# Patient Record
Sex: FEMALE | Race: WHITE | NOT HISPANIC OR LATINO
[De-identification: names, ages, dates, MRNs, and addresses within clinical notes are randomized per-mention and may not be internally consistent; named-entity substitution may affect disease eponyms.]

---

## 2017-03-22 ENCOUNTER — RX ONLY (OUTPATIENT)
Age: 82
Setting detail: RX ONLY
End: 2017-03-22

## 2017-03-28 ENCOUNTER — HOSPITAL ENCOUNTER (OUTPATIENT)
Dept: HOSPITAL 8 - CFH | Age: 82
Discharge: HOME | End: 2017-03-28
Attending: INTERNAL MEDICINE
Payer: MEDICARE

## 2017-03-28 DIAGNOSIS — R07.89: Primary | ICD-10-CM

## 2017-03-28 DIAGNOSIS — Z86.73: ICD-10-CM

## 2017-03-28 DIAGNOSIS — Z87.891: ICD-10-CM

## 2017-03-28 DIAGNOSIS — Q21.1: ICD-10-CM

## 2017-03-28 DIAGNOSIS — I10: ICD-10-CM

## 2017-03-28 DIAGNOSIS — I08.3: ICD-10-CM

## 2017-03-28 PROCEDURE — 93306 TTE W/DOPPLER COMPLETE: CPT

## 2017-03-28 PROCEDURE — A9502 TC99M TETROFOSMIN: HCPCS

## 2017-03-28 PROCEDURE — 78452 HT MUSCLE IMAGE SPECT MULT: CPT

## 2017-03-28 PROCEDURE — 93017 CV STRESS TEST TRACING ONLY: CPT

## 2017-04-05 ENCOUNTER — HOSPITAL ENCOUNTER (INPATIENT)
Dept: HOSPITAL 8 - ED | Age: 82
LOS: 1 days | Discharge: HOME | DRG: 74 | End: 2017-04-06
Attending: INTERNAL MEDICINE | Admitting: INTERNAL MEDICINE
Payer: MEDICARE

## 2017-04-05 VITALS — HEIGHT: 62 IN | BODY MASS INDEX: 32.66 KG/M2 | WEIGHT: 177.47 LBS

## 2017-04-05 VITALS — SYSTOLIC BLOOD PRESSURE: 148 MMHG | DIASTOLIC BLOOD PRESSURE: 63 MMHG

## 2017-04-05 VITALS — DIASTOLIC BLOOD PRESSURE: 70 MMHG | SYSTOLIC BLOOD PRESSURE: 155 MMHG

## 2017-04-05 DIAGNOSIS — I08.1: ICD-10-CM

## 2017-04-05 DIAGNOSIS — Z88.2: ICD-10-CM

## 2017-04-05 DIAGNOSIS — E87.6: ICD-10-CM

## 2017-04-05 DIAGNOSIS — G90.8: Primary | ICD-10-CM

## 2017-04-05 DIAGNOSIS — E86.0: ICD-10-CM

## 2017-04-05 DIAGNOSIS — J01.90: ICD-10-CM

## 2017-04-05 DIAGNOSIS — Z90.49: ICD-10-CM

## 2017-04-05 DIAGNOSIS — I10: ICD-10-CM

## 2017-04-05 DIAGNOSIS — E78.5: ICD-10-CM

## 2017-04-05 DIAGNOSIS — R73.9: ICD-10-CM

## 2017-04-05 DIAGNOSIS — E83.42: ICD-10-CM
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
## 2017-04-05 DIAGNOSIS — E87.1: ICD-10-CM

## 2017-04-05 DIAGNOSIS — J32.9: ICD-10-CM

## 2017-04-05 DIAGNOSIS — Z90.710: ICD-10-CM

## 2017-04-05 DIAGNOSIS — Z88.0: ICD-10-CM

## 2017-04-05 DIAGNOSIS — F17.200: ICD-10-CM

## 2017-04-05 DIAGNOSIS — R41.3: ICD-10-CM

## 2017-04-05 DIAGNOSIS — Z96.651: ICD-10-CM

## 2017-04-05 LAB
AST SERPL-CCNC: 10 U/L (ref 15–37)
BUN SERPL-MCNC: 20 MG/DL (ref 7–18)
HGB BLD-MCNC: 12 G/DL (ref 11.7–16.4)
IS PT STATUS REG ER OR PRE ER?: NO
IS PT STATUS REG ER OR PRE ER?: YES
IS PT STATUS REG ER OR PRE ER?: YES

## 2017-04-05 PROCEDURE — 71010: CPT

## 2017-04-05 PROCEDURE — 84484 ASSAY OF TROPONIN QUANT: CPT

## 2017-04-05 PROCEDURE — 93005 ELECTROCARDIOGRAM TRACING: CPT

## 2017-04-05 PROCEDURE — 81003 URINALYSIS AUTO W/O SCOPE: CPT

## 2017-04-05 PROCEDURE — 85730 THROMBOPLASTIN TIME PARTIAL: CPT

## 2017-04-05 PROCEDURE — 36415 COLL VENOUS BLD VENIPUNCTURE: CPT

## 2017-04-05 PROCEDURE — 93880 EXTRACRANIAL BILAT STUDY: CPT

## 2017-04-05 PROCEDURE — 80053 COMPREHEN METABOLIC PANEL: CPT

## 2017-04-05 PROCEDURE — 0T9B70Z DRAINAGE OF BLADDER WITH DRAINAGE DEVICE, VIA NATURAL OR ARTIFICIAL OPENING: ICD-10-PCS | Performed by: EMERGENCY MEDICINE

## 2017-04-05 PROCEDURE — 82962 GLUCOSE BLOOD TEST: CPT

## 2017-04-05 PROCEDURE — 70551 MRI BRAIN STEM W/O DYE: CPT

## 2017-04-05 PROCEDURE — 85610 PROTHROMBIN TIME: CPT

## 2017-04-05 PROCEDURE — 95819 EEG AWAKE AND ASLEEP: CPT

## 2017-04-05 PROCEDURE — 99285 EMERGENCY DEPT VISIT HI MDM: CPT

## 2017-04-05 PROCEDURE — 85025 COMPLETE CBC W/AUTO DIFF WBC: CPT

## 2017-04-05 PROCEDURE — 84443 ASSAY THYROID STIM HORMONE: CPT

## 2017-04-05 PROCEDURE — 83036 HEMOGLOBIN GLYCOSYLATED A1C: CPT

## 2017-04-05 PROCEDURE — 72125 CT NECK SPINE W/O DYE: CPT

## 2017-04-05 PROCEDURE — 83880 ASSAY OF NATRIURETIC PEPTIDE: CPT

## 2017-04-05 PROCEDURE — 83735 ASSAY OF MAGNESIUM: CPT

## 2017-04-05 PROCEDURE — 70450 CT HEAD/BRAIN W/O DYE: CPT

## 2017-04-05 RX ADMIN — ENOXAPARIN SODIUM SCH MG: 40 INJECTION SUBCUTANEOUS at 16:28

## 2017-04-05 RX ADMIN — INSULIN ASPART SCH UNITS: 100 INJECTION, SOLUTION INTRAVENOUS; SUBCUTANEOUS at 20:24

## 2017-04-05 RX ADMIN — FLUTICASONE PROPIONATE SCH SPRAY: 50 SPRAY, METERED NASAL at 20:24

## 2017-04-05 RX ADMIN — SODIUM CHLORIDE SCH MLS/HR: 0.9 INJECTION, SOLUTION INTRAVENOUS at 16:27

## 2017-04-05 RX ADMIN — SODIUM CHLORIDE, PRESERVATIVE FREE SCH ML: 5 INJECTION INTRAVENOUS at 20:24

## 2017-04-05 RX ADMIN — DOXYCYCLINE SCH MLS/HR: 100 INJECTION, POWDER, LYOPHILIZED, FOR SOLUTION INTRAVENOUS at 16:27

## 2017-04-05 RX ADMIN — INSULIN ASPART SCH UNITS: 100 INJECTION, SOLUTION INTRAVENOUS; SUBCUTANEOUS at 16:00

## 2017-04-06 VITALS — DIASTOLIC BLOOD PRESSURE: 72 MMHG | SYSTOLIC BLOOD PRESSURE: 146 MMHG

## 2017-04-06 VITALS — DIASTOLIC BLOOD PRESSURE: 70 MMHG | SYSTOLIC BLOOD PRESSURE: 144 MMHG

## 2017-04-06 VITALS — SYSTOLIC BLOOD PRESSURE: 151 MMHG | DIASTOLIC BLOOD PRESSURE: 78 MMHG

## 2017-04-06 LAB
AST SERPL-CCNC: 9 U/L (ref 15–37)
BUN SERPL-MCNC: 16 MG/DL (ref 7–18)
HGB BLD-MCNC: 11.3 G/DL (ref 11.7–16.4)

## 2017-04-06 RX ADMIN — FLUTICASONE PROPIONATE SCH SPRAY: 50 SPRAY, METERED NASAL at 08:53

## 2017-04-06 RX ADMIN — SODIUM CHLORIDE SCH MLS/HR: 0.9 INJECTION, SOLUTION INTRAVENOUS at 08:54

## 2017-04-06 RX ADMIN — POTASSIUM CHLORIDE SCH MEQ: 20 TABLET, EXTENDED RELEASE ORAL at 16:05

## 2017-04-06 RX ADMIN — INSULIN ASPART SCH UNITS: 100 INJECTION, SOLUTION INTRAVENOUS; SUBCUTANEOUS at 11:00

## 2017-04-06 RX ADMIN — DOXYCYCLINE SCH MLS/HR: 100 INJECTION, POWDER, LYOPHILIZED, FOR SOLUTION INTRAVENOUS at 02:35

## 2017-04-06 RX ADMIN — SODIUM CHLORIDE SCH MLS/HR: 0.9 INJECTION, SOLUTION INTRAVENOUS at 17:00

## 2017-04-06 RX ADMIN — SODIUM CHLORIDE, PRESERVATIVE FREE SCH ML: 5 INJECTION INTRAVENOUS at 08:54

## 2017-04-06 RX ADMIN — INSULIN ASPART SCH UNITS: 100 INJECTION, SOLUTION INTRAVENOUS; SUBCUTANEOUS at 15:05

## 2017-04-06 RX ADMIN — ENOXAPARIN SODIUM SCH MG: 40 INJECTION SUBCUTANEOUS at 14:30

## 2017-04-06 RX ADMIN — DOXYCYCLINE SCH MLS/HR: 100 INJECTION, POWDER, LYOPHILIZED, FOR SOLUTION INTRAVENOUS at 15:10

## 2017-04-06 RX ADMIN — INSULIN ASPART SCH UNITS: 100 INJECTION, SOLUTION INTRAVENOUS; SUBCUTANEOUS at 07:00

## 2017-04-06 RX ADMIN — POTASSIUM CHLORIDE SCH MEQ: 20 TABLET, EXTENDED RELEASE ORAL at 11:06

## 2017-06-30 ENCOUNTER — HOSPITAL ENCOUNTER (OUTPATIENT)
Dept: HOSPITAL 8 - WOUND | Age: 82
Discharge: HOME | End: 2017-06-30
Attending: PHYSICIAN ASSISTANT
Payer: MEDICARE

## 2017-06-30 DIAGNOSIS — Z96.651: ICD-10-CM

## 2017-06-30 DIAGNOSIS — L97.812: Primary | ICD-10-CM

## 2017-06-30 DIAGNOSIS — M19.90: ICD-10-CM

## 2017-06-30 DIAGNOSIS — Z90.710: ICD-10-CM

## 2017-06-30 DIAGNOSIS — N18.3: ICD-10-CM

## 2017-06-30 DIAGNOSIS — F17.210: ICD-10-CM

## 2017-06-30 DIAGNOSIS — Z72.89: ICD-10-CM

## 2017-06-30 DIAGNOSIS — E78.5: ICD-10-CM

## 2017-06-30 DIAGNOSIS — I12.9: ICD-10-CM

## 2017-06-30 PROCEDURE — 11042 DBRDMT SUBQ TIS 1ST 20SQCM/<: CPT

## 2017-07-07 ENCOUNTER — HOSPITAL ENCOUNTER (OUTPATIENT)
Dept: HOSPITAL 8 - WOUND | Age: 82
Discharge: HOME | End: 2017-07-07
Attending: PHYSICIAN ASSISTANT
Payer: MEDICARE

## 2017-07-07 DIAGNOSIS — Z72.89: ICD-10-CM

## 2017-07-07 DIAGNOSIS — N18.3: ICD-10-CM

## 2017-07-07 DIAGNOSIS — E78.5: ICD-10-CM

## 2017-07-07 DIAGNOSIS — F17.210: ICD-10-CM

## 2017-07-07 DIAGNOSIS — Z90.710: ICD-10-CM

## 2017-07-07 DIAGNOSIS — L97.812: Primary | ICD-10-CM

## 2017-07-07 DIAGNOSIS — Z79.01: ICD-10-CM

## 2017-07-07 DIAGNOSIS — I12.9: ICD-10-CM

## 2017-07-07 DIAGNOSIS — M19.90: ICD-10-CM

## 2017-07-07 PROCEDURE — 97597 DBRDMT OPN WND 1ST 20 CM/<: CPT

## 2017-07-21 ENCOUNTER — HOSPITAL ENCOUNTER (OUTPATIENT)
Dept: HOSPITAL 8 - WOUND | Age: 82
Discharge: HOME | End: 2017-07-21
Attending: PHYSICIAN ASSISTANT
Payer: MEDICARE

## 2017-07-21 DIAGNOSIS — E78.5: ICD-10-CM

## 2017-07-21 DIAGNOSIS — Z96.651: ICD-10-CM

## 2017-07-21 DIAGNOSIS — F17.210: ICD-10-CM

## 2017-07-21 DIAGNOSIS — Z90.710: ICD-10-CM

## 2017-07-21 DIAGNOSIS — L97.821: Primary | ICD-10-CM

## 2017-07-21 DIAGNOSIS — I12.9: ICD-10-CM

## 2017-07-21 DIAGNOSIS — Z72.89: ICD-10-CM

## 2017-07-21 DIAGNOSIS — M19.90: ICD-10-CM

## 2017-07-21 DIAGNOSIS — N18.3: ICD-10-CM

## 2017-07-21 PROCEDURE — 97597 DBRDMT OPN WND 1ST 20 CM/<: CPT

## 2017-07-28 ENCOUNTER — HOSPITAL ENCOUNTER (OUTPATIENT)
Dept: HOSPITAL 8 - WOUND | Age: 82
Discharge: HOME | End: 2017-07-28
Attending: PHYSICIAN ASSISTANT
Payer: MEDICARE

## 2017-07-28 DIAGNOSIS — Z79.01: ICD-10-CM

## 2017-07-28 DIAGNOSIS — Z72.89: ICD-10-CM

## 2017-07-28 DIAGNOSIS — L97.812: Primary | ICD-10-CM

## 2017-07-28 DIAGNOSIS — N18.3: ICD-10-CM

## 2017-07-28 DIAGNOSIS — Z90.710: ICD-10-CM

## 2017-07-28 DIAGNOSIS — Z96.651: ICD-10-CM

## 2017-07-28 DIAGNOSIS — E78.5: ICD-10-CM

## 2017-07-28 DIAGNOSIS — M19.90: ICD-10-CM

## 2017-07-28 DIAGNOSIS — F17.210: ICD-10-CM

## 2017-07-28 DIAGNOSIS — I12.9: ICD-10-CM

## 2018-02-27 ENCOUNTER — HOSPITAL ENCOUNTER (OUTPATIENT)
Dept: HOSPITAL 8 - CFH | Age: 83
Discharge: HOME | End: 2018-02-27
Attending: NURSE PRACTITIONER
Payer: MEDICARE

## 2018-02-27 DIAGNOSIS — N18.9: ICD-10-CM

## 2018-02-27 DIAGNOSIS — I12.9: ICD-10-CM

## 2018-02-27 DIAGNOSIS — I65.23: Primary | ICD-10-CM

## 2018-02-27 PROCEDURE — 93880 EXTRACRANIAL BILAT STUDY: CPT

## 2020-03-08 ENCOUNTER — HOSPITAL ENCOUNTER (EMERGENCY)
Dept: HOSPITAL 8 - ED | Age: 85
Discharge: HOME | End: 2020-03-08
Payer: MEDICARE

## 2020-03-08 VITALS — SYSTOLIC BLOOD PRESSURE: 116 MMHG | DIASTOLIC BLOOD PRESSURE: 58 MMHG

## 2020-03-08 VITALS — BODY MASS INDEX: 31.22 KG/M2 | WEIGHT: 165.35 LBS | HEIGHT: 61 IN

## 2020-03-08 DIAGNOSIS — Z90.710: ICD-10-CM

## 2020-03-08 DIAGNOSIS — F17.200: ICD-10-CM

## 2020-03-08 DIAGNOSIS — R55: Primary | ICD-10-CM

## 2020-03-08 DIAGNOSIS — Z90.89: ICD-10-CM

## 2020-03-08 DIAGNOSIS — I10: ICD-10-CM

## 2020-03-08 LAB
ALBUMIN SERPL-MCNC: 3.3 G/DL (ref 3.4–5)
ANION GAP SERPL CALC-SCNC: 9 MMOL/L (ref 5–15)
BASOPHILS # BLD AUTO: 0.04 X10^3/UL (ref 0–0.1)
BASOPHILS NFR BLD AUTO: 1 % (ref 0–1)
CALCIUM SERPL-MCNC: 8.6 MG/DL (ref 8.5–10.1)
CHLORIDE SERPL-SCNC: 104 MMOL/L (ref 98–107)
CREAT SERPL-MCNC: 1.04 MG/DL (ref 0.55–1.02)
EOSINOPHIL # BLD AUTO: 0.23 X10^3/UL (ref 0–0.4)
EOSINOPHIL NFR BLD AUTO: 4 % (ref 1–7)
ERYTHROCYTE [DISTWIDTH] IN BLOOD BY AUTOMATED COUNT: 15.3 % (ref 9.6–15.2)
LYMPHOCYTES # BLD AUTO: 1.85 X10^3/UL (ref 1–3.4)
LYMPHOCYTES NFR BLD AUTO: 33 % (ref 22–44)
MCH RBC QN AUTO: 29.6 PG (ref 27–34.8)
MCHC RBC AUTO-ENTMCNC: 32.9 G/DL (ref 32.4–35.8)
MCV RBC AUTO: 90 FL (ref 80–100)
MD: NO
MONOCYTES # BLD AUTO: 0.26 X10^3/UL (ref 0.2–0.8)
MONOCYTES NFR BLD AUTO: 5 % (ref 2–9)
NEUTROPHILS # BLD AUTO: 3.23 X10^3/UL (ref 1.8–6.8)
NEUTROPHILS NFR BLD AUTO: 58 % (ref 42–75)
PLATELET # BLD AUTO: 249 X10^3/UL (ref 130–400)
PMV BLD AUTO: 8.8 FL (ref 7.4–10.4)
RBC # BLD AUTO: 4.21 X10^6/UL (ref 3.82–5.3)
TROPONIN I SERPL-MCNC: < 0.015 NG/ML (ref 0–0.04)

## 2020-03-08 PROCEDURE — 80048 BASIC METABOLIC PNL TOTAL CA: CPT

## 2020-03-08 PROCEDURE — 99284 EMERGENCY DEPT VISIT MOD MDM: CPT

## 2020-03-08 PROCEDURE — 82040 ASSAY OF SERUM ALBUMIN: CPT

## 2020-03-08 PROCEDURE — 99285 EMERGENCY DEPT VISIT HI MDM: CPT

## 2020-03-08 PROCEDURE — 83880 ASSAY OF NATRIURETIC PEPTIDE: CPT

## 2020-03-08 PROCEDURE — 71045 X-RAY EXAM CHEST 1 VIEW: CPT

## 2020-03-08 PROCEDURE — 36415 COLL VENOUS BLD VENIPUNCTURE: CPT

## 2020-03-08 PROCEDURE — 84484 ASSAY OF TROPONIN QUANT: CPT

## 2020-03-08 PROCEDURE — 93005 ELECTROCARDIOGRAM TRACING: CPT

## 2020-03-08 PROCEDURE — 85025 COMPLETE CBC W/AUTO DIFF WBC: CPT

## 2021-01-14 DIAGNOSIS — Z23 NEED FOR VACCINATION: ICD-10-CM

## 2021-02-23 ENCOUNTER — HOSPITAL ENCOUNTER (OUTPATIENT)
Dept: HOSPITAL 8 - CFH | Age: 86
Discharge: HOME | End: 2021-02-23
Attending: NURSE PRACTITIONER
Payer: MEDICARE

## 2021-02-23 DIAGNOSIS — Z12.2: Primary | ICD-10-CM

## 2021-02-23 DIAGNOSIS — I25.10: ICD-10-CM

## 2021-02-23 DIAGNOSIS — F17.210: ICD-10-CM

## 2021-02-23 DIAGNOSIS — K44.9: ICD-10-CM

## 2021-02-23 PROCEDURE — 71271 CT THORAX LUNG CANCER SCR C-: CPT

## 2021-07-22 ENCOUNTER — HOSPITAL ENCOUNTER (OUTPATIENT)
Dept: HOSPITAL 8 - CFH | Age: 86
Discharge: HOME | End: 2021-07-22
Attending: NURSE PRACTITIONER
Payer: MEDICARE

## 2021-07-22 DIAGNOSIS — Z12.31: Primary | ICD-10-CM

## 2021-07-22 PROCEDURE — 77063 BREAST TOMOSYNTHESIS BI: CPT

## 2021-07-22 PROCEDURE — 77067 SCR MAMMO BI INCL CAD: CPT

## 2022-11-07 ENCOUNTER — HOSPITAL ENCOUNTER (EMERGENCY)
Facility: MEDICAL CENTER | Age: 87
End: 2022-11-07
Attending: STUDENT IN AN ORGANIZED HEALTH CARE EDUCATION/TRAINING PROGRAM
Payer: MEDICARE

## 2022-11-07 ENCOUNTER — APPOINTMENT (OUTPATIENT)
Dept: RADIOLOGY | Facility: MEDICAL CENTER | Age: 87
End: 2022-11-07
Attending: STUDENT IN AN ORGANIZED HEALTH CARE EDUCATION/TRAINING PROGRAM
Payer: MEDICARE

## 2022-11-07 VITALS
TEMPERATURE: 98.1 F | RESPIRATION RATE: 16 BRPM | WEIGHT: 150 LBS | HEART RATE: 81 BPM | HEIGHT: 61 IN | BODY MASS INDEX: 28.32 KG/M2 | OXYGEN SATURATION: 91 % | SYSTOLIC BLOOD PRESSURE: 147 MMHG | DIASTOLIC BLOOD PRESSURE: 66 MMHG

## 2022-11-07 DIAGNOSIS — R03.0 ELEVATED BLOOD PRESSURE READING: ICD-10-CM

## 2022-11-07 DIAGNOSIS — E87.1 HYPONATREMIA: ICD-10-CM
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
## 2022-11-07 DIAGNOSIS — F10.920 ALCOHOLIC INTOXICATION WITHOUT COMPLICATION (HCC): ICD-10-CM

## 2022-11-07 LAB
ALBUMIN SERPL BCP-MCNC: 3.9 G/DL (ref 3.2–4.9)
ALBUMIN/GLOB SERPL: 1.5 G/DL
ALP SERPL-CCNC: 76 U/L (ref 30–99)
ALT SERPL-CCNC: 16 U/L (ref 2–50)
ANION GAP SERPL CALC-SCNC: 14 MMOL/L (ref 7–16)
AST SERPL-CCNC: 28 U/L (ref 12–45)
BASOPHILS # BLD AUTO: 1.1 % (ref 0–1.8)
BASOPHILS # BLD: 0.06 K/UL (ref 0–0.12)
BILIRUB SERPL-MCNC: 0.2 MG/DL (ref 0.1–1.5)
BUN SERPL-MCNC: 15 MG/DL (ref 8–22)
CALCIUM SERPL-MCNC: 8.4 MG/DL (ref 8.5–10.5)
CHLORIDE SERPL-SCNC: 94 MMOL/L (ref 96–112)
CO2 SERPL-SCNC: 21 MMOL/L (ref 20–33)
CREAT SERPL-MCNC: 0.87 MG/DL (ref 0.5–1.4)
EKG IMPRESSION: NORMAL
EOSINOPHIL # BLD AUTO: 0.34 K/UL (ref 0–0.51)
EOSINOPHIL NFR BLD: 6 % (ref 0–6.9)
ERYTHROCYTE [DISTWIDTH] IN BLOOD BY AUTOMATED COUNT: 45.3 FL (ref 35.9–50)
ETHANOL BLD-MCNC: 269 MG/DL
GFR SERPLBLD CREATININE-BSD FMLA CKD-EPI: 65 ML/MIN/1.73 M 2
GLOBULIN SER CALC-MCNC: 2.6 G/DL (ref 1.9–3.5)
GLUCOSE SERPL-MCNC: 102 MG/DL (ref 65–99)
HCT VFR BLD AUTO: 39.9 % (ref 37–47)
HGB BLD-MCNC: 13.4 G/DL (ref 12–16)
IMM GRANULOCYTES # BLD AUTO: 0.02 K/UL (ref 0–0.11)
IMM GRANULOCYTES NFR BLD AUTO: 0.4 % (ref 0–0.9)
LYMPHOCYTES # BLD AUTO: 2.35 K/UL (ref 1–4.8)
LYMPHOCYTES NFR BLD: 41.8 % (ref 22–41)
MCH RBC QN AUTO: 29.6 PG (ref 27–33)
MCHC RBC AUTO-ENTMCNC: 33.6 G/DL (ref 33.6–35)
MCV RBC AUTO: 88.3 FL (ref 81.4–97.8)
MONOCYTES # BLD AUTO: 0.32 K/UL (ref 0–0.85)
MONOCYTES NFR BLD AUTO: 5.7 % (ref 0–13.4)
NEUTROPHILS # BLD AUTO: 2.53 K/UL (ref 2–7.15)
NEUTROPHILS NFR BLD: 45 % (ref 44–72)
NRBC # BLD AUTO: 0 K/UL
NRBC BLD-RTO: 0 /100 WBC
PLATELET # BLD AUTO: 220 K/UL (ref 164–446)
PMV BLD AUTO: 10.3 FL (ref 9–12.9)
POTASSIUM SERPL-SCNC: 3.6 MMOL/L (ref 3.6–5.5)
PROT SERPL-MCNC: 6.5 G/DL (ref 6–8.2)
RBC # BLD AUTO: 4.52 M/UL (ref 4.2–5.4)
SODIUM SERPL-SCNC: 129 MMOL/L (ref 135–145)
WBC # BLD AUTO: 5.6 K/UL (ref 4.8–10.8)

## 2022-11-07 PROCEDURE — 85025 COMPLETE CBC W/AUTO DIFF WBC: CPT

## 2022-11-07 PROCEDURE — 99284 EMERGENCY DEPT VISIT MOD MDM: CPT

## 2022-11-07 PROCEDURE — 80053 COMPREHEN METABOLIC PANEL: CPT

## 2022-11-07 PROCEDURE — 72125 CT NECK SPINE W/O DYE: CPT

## 2022-11-07 PROCEDURE — 82077 ASSAY SPEC XCP UR&BREATH IA: CPT

## 2022-11-07 PROCEDURE — 93005 ELECTROCARDIOGRAM TRACING: CPT | Performed by: STUDENT IN AN ORGANIZED HEALTH CARE EDUCATION/TRAINING PROGRAM

## 2022-11-07 PROCEDURE — 70450 CT HEAD/BRAIN W/O DYE: CPT

## 2022-11-07 PROCEDURE — 36415 COLL VENOUS BLD VENIPUNCTURE: CPT

## 2022-11-07 NOTE — ED NOTES
Pt continually attempting to get out of bed stating she wants to go home. ERP at bedside for re-eval. Pt educated that she cannot leave until she is sober or someone is able to pick her up. Pt does not verbalize understanding, reinforcement needed. Pt in direct view of staff.

## 2022-11-07 NOTE — ED NOTES
ERP at bedside to discuss discharge with pt's sister. PIV removed. Pt ambulated from ED with steady gait. Pt discharged home to sister, Rosalba.

## 2022-11-07 NOTE — ED TRIAGE NOTES
"Chief Complaint   Patient presents with    GLF    Alcohol Intoxication     Pt BIB EMS from [facility] [place] for possible GLF, pt cannot recall events and there were no witnesses. EMS called after hotel staff saw pt laying on ground unconscious. Pt reports drinking 4 rum and cokes this evening, states she does not remember falling. Unknown head strike, +LOC, +thinners. No obvious signs of trauma, pt denies any pain. Chart up for ERP.     BP (!) 140/63   Pulse 65   Temp 36.2 °C (97.1 °F) (Temporal)   Resp 18   Ht 1.549 m (5' 1\")   Wt 68 kg (150 lb)   SpO2 93%   BMI 28.34 kg/m²     "

## 2022-11-07 NOTE — ED PROVIDER NOTES
ED Provider Note    CHIEF COMPLAINT  Chief Complaint   Patient presents with    GLF    Alcohol Intoxication       HPI  Lily Lloyd is a 87 y.o. female who presents after a ground-level fall.  Patient does not recall the events and tells me that 'nothing happened.'  Reportedly EMS were called as she had a possible GLF at the [facility] and EMS found her lying on the ground unconscious.  Patient does tell me that she takes blood thinners but then on further questioning says she takes 'lisinopril'.  I am unable to find any anticoagulation in her medication list.  She does not remember falling and said she did not hit her head.  She denies any pain anywhere.  She does not feel lightheaded and denies any complaints at this time.  She denies any chest pain or shortness of breath.  She does endorse drinking alcohol says she had 4 rum and Cokes earlier this evening.  Further history limited as patient is clearly intoxicated    REVIEW OF SYSTEMS  Unable to obtain  due to patient condition    PAST MEDICAL HISTORY  Past Medical History:   Diagnosis Date    Arthritis     every where    Breath shortness     EXERCISE    CATARACT     Diabetes     Told pre-diabetic 2 yrs ago, diet controlled    Heart burn     takes protonix occasionally    Hiatus hernia syndrome     Hypertension     controlled on meds    Indigestion     Acid Reflux    Pain     Renal disorder     currently being worked up.     Snoring     Does not want to have evaluated at this time     Unable to provide family history     SOCIAL HISTORY  Social History     Tobacco Use    Smoking status: Every Day     Packs/day: 0.50     Years: 50.00     Pack years: 25.00     Types: Cigarettes    Smokeless tobacco: Current    Tobacco comments:     0.5 PPD X45 YRS   Substance Use Topics    Alcohol use: Yes     Alcohol/week: 1.0 oz     Types: 2 Glasses of wine per week     Comment: socially    Drug use: No       SURGICAL HISTORY  Past Surgical History:   Procedure Laterality Date     CATARACT PHACO WITH IOL  8/20/2013    Performed by Alejandro Peres M.D. at SURGERY [facility] ORS    CATARACT PHACO WITH IOL  8/6/2013    Performed by Alejandro Peres M.D. at SURGERY [facility] ORS    SINUSOTOMIES  12/20/2012    Performed by Hattie Rolle M.D. [facility]    NASAL POLYPECTOMY  12/20/2012    Performed by Hattie Rolle M.D. at SURGERY [facility]S    NASAL POLYPECTOMY  8/19/2010    Performed by HATTIE ROLLE at SURGERY [facility] ORS    ETHMOIDECTOMY  8/19/2010    Performed by HATTIE ROLLE at SURGERY [facility] ORS    ANTROSTOMY  8/19/2010    Performed by HATTIE ROLLE at SURGERY [facility] ORS    ABDOMINAL HYSTERECTOMY TOTAL      APPENDECTOMY      KNEE ARTHROPLASTY TOTAL      right    OOPHORECTOMY         CURRENT MEDICATIONS  Home Medications       Reviewed by Orin Urban R.N. (Registered Nurse) on 11/07/22 at 0020  Med List Status: <None>     Medication Last Dose Status   acetaminophen (Q-NOL) 325 MG Tab  Active   albuterol (VENTOLIN OR PROVENTIL) 108 (90 BASE) MCG/ACT AERS inhalation aerosol  Active   Cholecalciferol (VITAMIN D3) 2000 UNIT CAPS  Active   docosahexanoic acid (OMEGA 3 FA) 1000 MG CAPS  Active   ifurnej-ugbzexkhds-yet (ROBITUSSIN CF) 30- MG/5ML LIQD  Active   guaifenesin-codeine (TUSSI-ORGANIDIN NR) 100-10 MG/5ML syrup  Active   hydrochlorothiazide (HYDRODIURIL) 12.5 MG tablet  Active   lisinopril (PRINIVIL, ZESTRIL) 40 MG tablet  Active   metoprolol SR (TOPROL XL) 50 MG TABLET SR 24 HR  Active   Multiple Vitamins-Minerals (MULTIVITAMIN PO)  Active   pantoprazole (PROTONIX) 40 MG TBEC  Active   potassium chloride SA (K-DUR) 20 MEQ Tab CR  Active   simvastatin (ZOCOR) 40 MG Tab  Active   tizanidine (ZANAFLEX) 4 MG Tab  Active                    ALLERGIES  Allergies   Allergen Reactions    Pcn [Penicillins] Rash and Swelling    Sulfa Drugs Hives and Itching    Tape        PHYSICAL EXAM  VITAL SIGNS:  "BP (!) 147/66   Pulse 81   Temp 36.7 °C (98.1 °F) (Temporal)   Resp 16   Ht 1.549 m (5' 1\")   Wt 68 kg (150 lb)   SpO2 91%   BMI 28.34 kg/m²    Constitutional: Awake and alert . Intoxicated  HENT: NCAT.  moist mucous membranes  Eyes: Normal inspection  Neck: Grossly normal range of motion.  No midline neck pain.  Cardiovascular: Normal heart rate, Normal rhythm. No audible murmur. Symmetric peripheral pulses.   Thorax & Lungs: No respiratory distress, No wheezing, No rales, No rhonchi, No chest tenderness.   Abdomen: Soft, non-distended, nontender, no mass  Skin: No obvious rash.  Back: No tenderness, No CVA tenderness.   Extremities: Warm, well perfused. No clubbing, cyanosis, edema,   Neurologic: Alert though confused.  Moving all extremities symmetrically.  Psychiatric: Normal for situation    RADIOLOGY/PROCEDURES  CT-HEAD W/O   Final Result      1. No CT evidence of acute infarct, hemorrhage or mass.   2. Mild global parenchymal atrophy. Chronic small vessel ischemic changes.   3. Chronic paranasal sinus disease.      CT-CSPINE WITHOUT PLUS RECONS   Final Result      No acute fracture or traumatic listhesis in the cervical spine.           Imaging is interpreted by radiologist    Labs:  Results for orders placed or performed during the hospital encounter of 11/07/22   DIAGNOSTIC ALCOHOL   Result Value Ref Range    Diagnostic Alcohol 269.0 (H) <10.1 mg/dL   COMP METABOLIC PANEL   Result Value Ref Range    Sodium 129 (L) 135 - 145 mmol/L    Potassium 3.6 3.6 - 5.5 mmol/L    Chloride 94 (L) 96 - 112 mmol/L    Co2 21 20 - 33 mmol/L    Anion Gap 14.0 7.0 - 16.0    Glucose 102 (H) 65 - 99 mg/dL    Bun 15 8 - 22 mg/dL    Creatinine 0.87 0.50 - 1.40 mg/dL    Calcium 8.4 (L) 8.5 - 10.5 mg/dL    AST(SGOT) 28 12 - 45 U/L    ALT(SGPT) 16 2 - 50 U/L    Alkaline Phosphatase 76 30 - 99 U/L    Total Bilirubin 0.2 0.1 - 1.5 mg/dL    Albumin 3.9 3.2 - 4.9 g/dL    Total Protein 6.5 6.0 - 8.2 g/dL    Globulin 2.6 1.9 - 3.5 " g/dL    A-G Ratio 1.5 g/dL   CBC WITH DIFFERENTIAL   Result Value Ref Range    WBC 5.6 4.8 - 10.8 K/uL    RBC 4.52 4.20 - 5.40 M/uL    Hemoglobin 13.4 12.0 - 16.0 g/dL    Hematocrit 39.9 37.0 - 47.0 %    MCV 88.3 81.4 - 97.8 fL    MCH 29.6 27.0 - 33.0 pg    MCHC 33.6 33.6 - 35.0 g/dL    RDW 45.3 35.9 - 50.0 fL    Platelet Count 220 164 - 446 K/uL    MPV 10.3 9.0 - 12.9 fL    Neutrophils-Polys 45.00 44.00 - 72.00 %    Lymphocytes 41.80 (H) 22.00 - 41.00 %    Monocytes 5.70 0.00 - 13.40 %    Eosinophils 6.00 0.00 - 6.90 %    Basophils 1.10 0.00 - 1.80 %    Immature Granulocytes 0.40 0.00 - 0.90 %    Nucleated RBC 0.00 /100 WBC    Neutrophils (Absolute) 2.53 2.00 - 7.15 K/uL    Lymphs (Absolute) 2.35 1.00 - 4.80 K/uL    Monos (Absolute) 0.32 0.00 - 0.85 K/uL    Eos (Absolute) 0.34 0.00 - 0.51 K/uL    Baso (Absolute) 0.06 0.00 - 0.12 K/uL    Immature Granulocytes (abs) 0.02 0.00 - 0.11 K/uL    NRBC (Absolute) 0.00 K/uL   ESTIMATED GFR   Result Value Ref Range    GFR (CKD-EPI) 65 >60 mL/min/1.73 m 2   EKG   Result Value Ref Range    Report       [facility] Emergency Dept.    Test Date:  2022  Pt Name:    ZULEIMA COLE                  Department: ER  MRN:        [id number]                      Room:       RD 08  Gender:     Female                       Technician: [id number]  :        [id number]                   Requested By:GARETH SALDAÑA  Order #:    [id number]                    Reading MD:    Measurements  Intervals                                Axis  Rate:       67                           P:          74  MN:         180                          QRS:        76  QRSD:       102                          T:          88  QT:         516  QTc:        545    Interpretive Statements  Sinus rhythm  Borderline abnrm T, anterolateral leads  Prolonged QT interval  Compared to ECG 2012 09:21:37  Prolonged QT interval now present  T-wave abnormality no longer present  Possible ischemia no longer  present       The EKG was reviewed by me and interpreted as documented above      Medications - No data to display    Measures:  HTN/IDDM FOLLOW UP:  The patient has known hypertension and is being followed by their primary care doctor    COURSE & MEDICAL DECISION MAKING    This is an 87-year-old female who presents after possible ground-level fall, [place]  She arrives mildly hypertensive but otherwise she has normal vital signs.  She is clearly intoxicated but has no focal neurological deficit and no obvious signs of trauma on exam.  Given unclear history of trauma, CT imaging obtained.  CT head without intracranial hemorrhage.  No acute fracture or subluxation of the C-spine.  I reviewed her EKG she does have a prolonged QTC but otherwise does not have any signs of dysrhythmia, ischemia or heart block.  Her labs are notable for sodium of 129 and an alcohol level of 269 otherwise no significant abnormalities.  She is not anemic and she does not have a leukocytosis.  Unfortunately no recent sodium to determine chronicity however my suspicion is it is likely in the setting of alcohol use and decreased p.o. intake.  Unclear, if patient truly syncopized given her history but I do feel reassured by her normal work-up in the ER.  I doubt cardiac etiology given no complaints of chest pain, shortness of breath, no murmur appreciated on exam and no known history of heart failure or other high risk features.  More likely she was intoxicated leading to ?syncope vs fall.     2:07 AM  I was called to the bedside as patient is trying to get up and leave. She has put on all her clothes.  When I ask how she intends to get home she says she wants to drive.  She does not recall being taken here by an ambulance.  She is still clearly intoxicated.  I explained to the patient and she is unable to leave until clinically sober.     The patient's sister, Rosalba, later arrived in the emergency  department to take her home.  Rosalba is clearly sober and is overall very reasonable.  She tells me that events such as this has happened before when she has been drinking, however not for 2 yrs.  I told her that this is should not happen and that she needs to follow-up closely with her doctor to pursue further medical work-up.  She expresses understanding and will help facilitate this.  I think that this is an appropriate plan and far preferable to placing the patient on a legal hold and having her stay against her will.  Rosalba tells me that she feels comfortable taking her home, that she lives with her and will bring her back for any new or worsening medical complaints.        FINAL IMPRESSION  1. Elevated blood pressure reading Acute       2. Alcoholic intoxication without complication (HCC) Acute       3. Fall from ground level Acute       4. Hyponatremia                This dictation was created using voice recognition software. The accuracy of the dictation is limited to the abilities of the software.  The nursing notes were reviewed and certain aspects of this information were incorporated into this note.      Electronically signed by: Junie Whitten M.D., 11/7/2022 12:36 AM

## 2023-04-03 ENCOUNTER — APPOINTMENT (OUTPATIENT)
Dept: RADIOLOGY | Facility: MEDICAL CENTER | Age: 88
DRG: 026 | End: 2023-04-03
Attending: EMERGENCY MEDICINE
Payer: MEDICARE

## 2023-04-03 ENCOUNTER — ANESTHESIA (OUTPATIENT)
Dept: SURGERY | Facility: MEDICAL CENTER | Age: 88
DRG: 026 | End: 2023-04-03
Payer: MEDICARE

## 2023-04-03 ENCOUNTER — ANESTHESIA EVENT (OUTPATIENT)
Dept: SURGERY | Facility: MEDICAL CENTER | Age: 88
DRG: 026 | End: 2023-04-03
Payer: MEDICARE

## 2023-04-03 ENCOUNTER — HOSPITAL ENCOUNTER (INPATIENT)
Facility: MEDICAL CENTER | Age: 88
LOS: 3 days | DRG: 026 | End: 2023-04-06
Attending: EMERGENCY MEDICINE | Admitting: SURGERY
Payer: MEDICARE

## 2023-04-03 ENCOUNTER — APPOINTMENT (OUTPATIENT)
Dept: RADIOLOGY | Facility: MEDICAL CENTER | Age: 88
DRG: 026 | End: 2023-04-03
Attending: NURSE PRACTITIONER
Payer: MEDICARE

## 2023-04-03 DIAGNOSIS — S06.5X1A POST-TRAUMATIC SUBDURAL HEMATOMA, WITH LOSS OF CONSCIOUSNESS OF 30 MINUTES OR LESS, INITIAL ENCOUNTER (HCC): Primary | ICD-10-CM

## 2023-04-03 DIAGNOSIS — I10 ESSENTIAL HYPERTENSION: ICD-10-CM

## 2023-04-03 DIAGNOSIS — R25.2 LEG CRAMP: ICD-10-CM

## 2023-04-03 DIAGNOSIS — F10.920 ALCOHOLIC INTOXICATION WITHOUT COMPLICATION (HCC): ICD-10-CM

## 2023-04-03 DIAGNOSIS — S06.5X0A POST-TRAUMATIC SUBDURAL HEMATOMA, WITHOUT LOSS OF CONSCIOUSNESS, INITIAL ENCOUNTER (HCC): ICD-10-CM

## 2023-04-03 PROBLEM — T14.90XA TRAUMA: Status: ACTIVE | Noted: 2023-04-03

## 2023-04-03 PROBLEM — E87.1 HYPONATREMIA: Status: ACTIVE | Noted: 2023-04-03

## 2023-04-03 PROBLEM — S06.5XAA SUBDURAL HEMATOMA, POST-TRAUMATIC (HCC): Status: ACTIVE | Noted: 2023-04-03

## 2023-04-03 PROBLEM — Z79.82 ASPIRIN LONG-TERM USE: Status: ACTIVE | Noted: 2023-04-03

## 2023-04-03 PROBLEM — Z53.09 CONTRAINDICATION TO DEEP VEIN THROMBOSIS (DVT) PROPHYLAXIS: Status: ACTIVE | Noted: 2023-04-03

## 2023-04-03 PROBLEM — E87.6 HYPOKALEMIA: Status: ACTIVE | Noted: 2023-04-03

## 2023-04-03 PROBLEM — F10.10 ALCOHOL ABUSE: Status: ACTIVE | Noted: 2023-04-03

## 2023-04-03 LAB
ALBUMIN SERPL BCP-MCNC: 4 G/DL (ref 3.2–4.9)
ALBUMIN/GLOB SERPL: 1.7 G/DL
ALP SERPL-CCNC: 83 U/L (ref 30–99)
ALT SERPL-CCNC: 13 U/L (ref 2–50)
ANION GAP SERPL CALC-SCNC: 15 MMOL/L (ref 7–16)
ANION GAP SERPL CALC-SCNC: 18 MMOL/L (ref 7–16)
APTT PPP: 29.7 SEC (ref 24.7–36)
AST SERPL-CCNC: 21 U/L (ref 12–45)
BASOPHILS # BLD AUTO: 0.5 % (ref 0–1.8)
BASOPHILS # BLD: 0.03 K/UL (ref 0–0.12)
BILIRUB SERPL-MCNC: 0.2 MG/DL (ref 0.1–1.5)
BUN SERPL-MCNC: 11 MG/DL (ref 8–22)
BUN SERPL-MCNC: 14 MG/DL (ref 8–22)
CALCIUM ALBUM COR SERPL-MCNC: 8.2 MG/DL (ref 8.5–10.5)
CALCIUM SERPL-MCNC: 8.2 MG/DL (ref 8.5–10.5)
CALCIUM SERPL-MCNC: 8.2 MG/DL (ref 8.5–10.5)
CFT BLD TEG: 4.7 MIN (ref 4.6–9.1)
CFT P HPASE BLD TEG: 4.6 MIN (ref 4.3–8.3)
CHLORIDE SERPL-SCNC: 91 MMOL/L (ref 96–112)
CHLORIDE SERPL-SCNC: 98 MMOL/L (ref 96–112)
CLOT ANGLE BLD TEG: 73.3 DEGREES (ref 63–78)
CO2 SERPL-SCNC: 18 MMOL/L (ref 20–33)
CO2 SERPL-SCNC: 18 MMOL/L (ref 20–33)
CREAT SERPL-MCNC: 0.94 MG/DL (ref 0.5–1.4)
CREAT SERPL-MCNC: 0.99 MG/DL (ref 0.5–1.4)
CT.EXTRINSIC BLD ROTEM: 1.3 MIN (ref 0.8–2.1)
EOSINOPHIL # BLD AUTO: 0.31 K/UL (ref 0–0.51)
EOSINOPHIL NFR BLD: 5.7 % (ref 0–6.9)
ERYTHROCYTE [DISTWIDTH] IN BLOOD BY AUTOMATED COUNT: 44.3 FL (ref 35.9–50)
ETHANOL BLD-MCNC: 250.5 MG/DL
GFR SERPLBLD CREATININE-BSD FMLA CKD-EPI: 55 ML/MIN/1.73 M 2
GFR SERPLBLD CREATININE-BSD FMLA CKD-EPI: 59 ML/MIN/1.73 M 2
GLOBULIN SER CALC-MCNC: 2.4 G/DL (ref 1.9–3.5)
GLUCOSE SERPL-MCNC: 86 MG/DL (ref 65–99)
GLUCOSE SERPL-MCNC: 96 MG/DL (ref 65–99)
HCT VFR BLD AUTO: 39 % (ref 37–47)
HGB BLD-MCNC: 12.6 G/DL (ref 12–16)
IMM GRANULOCYTES # BLD AUTO: 0.02 K/UL (ref 0–0.11)
IMM GRANULOCYTES NFR BLD AUTO: 0.4 % (ref 0–0.9)
INR PPP: 0.96 (ref 0.87–1.13)
LYMPHOCYTES # BLD AUTO: 2.41 K/UL (ref 1–4.8)
LYMPHOCYTES NFR BLD: 44.1 % (ref 22–41)
MAGNESIUM SERPL-MCNC: 2 MG/DL (ref 1.5–2.5)
MCF BLD TEG: 60.2 MM (ref 52–69)
MCF.PLATELET INHIB BLD ROTEM: 18.3 MM (ref 15–32)
MCH RBC QN AUTO: 27.5 PG (ref 27–33)
MCHC RBC AUTO-ENTMCNC: 32.3 G/DL (ref 33.6–35)
MCV RBC AUTO: 85.2 FL (ref 81.4–97.8)
MONOCYTES # BLD AUTO: 0.32 K/UL (ref 0–0.85)
MONOCYTES NFR BLD AUTO: 5.9 % (ref 0–13.4)
NEUTROPHILS # BLD AUTO: 2.37 K/UL (ref 2–7.15)
NEUTROPHILS NFR BLD: 43.4 % (ref 44–72)
NRBC # BLD AUTO: 0 K/UL
NRBC BLD-RTO: 0 /100 WBC
PA AA BLD-ACNC: 29.1 % (ref 0–11)
PA ADP BLD-ACNC: 15.7 % (ref 0–17)
PHOSPHATE SERPL-MCNC: 3 MG/DL (ref 2.5–4.5)
PLATELET # BLD AUTO: 266 K/UL (ref 164–446)
PMV BLD AUTO: 10.3 FL (ref 9–12.9)
POTASSIUM SERPL-SCNC: 3.4 MMOL/L (ref 3.6–5.5)
POTASSIUM SERPL-SCNC: 4 MMOL/L (ref 3.6–5.5)
PROT SERPL-MCNC: 6.4 G/DL (ref 6–8.2)
PROTHROMBIN TIME: 12.7 SEC (ref 12–14.6)
RBC # BLD AUTO: 4.58 M/UL (ref 4.2–5.4)
SODIUM SERPL-SCNC: 127 MMOL/L (ref 135–145)
SODIUM SERPL-SCNC: 131 MMOL/L (ref 135–145)
TEG ALGORITHM TGALG: ABNORMAL
WBC # BLD AUTO: 5.5 K/UL (ref 4.8–10.8)

## 2023-04-03 PROCEDURE — 80053 COMPREHEN METABOLIC PANEL: CPT

## 2023-04-03 PROCEDURE — 99140 ANES COMP EMERGENCY COND: CPT | Performed by: STUDENT IN AN ORGANIZED HEALTH CARE EDUCATION/TRAINING PROGRAM

## 2023-04-03 PROCEDURE — 160009 HCHG ANES TIME/MIN: Performed by: NEUROLOGICAL SURGERY

## 2023-04-03 PROCEDURE — 700101 HCHG RX REV CODE 250: Performed by: STUDENT IN AN ORGANIZED HEALTH CARE EDUCATION/TRAINING PROGRAM

## 2023-04-03 PROCEDURE — 85610 PROTHROMBIN TIME: CPT

## 2023-04-03 PROCEDURE — 770022 HCHG ROOM/CARE - ICU (200)

## 2023-04-03 PROCEDURE — 83735 ASSAY OF MAGNESIUM: CPT

## 2023-04-03 PROCEDURE — 36415 COLL VENOUS BLD VENIPUNCTURE: CPT

## 2023-04-03 PROCEDURE — 99100 ANES PT EXTEME AGE<1 YR&>70: CPT | Performed by: STUDENT IN AN ORGANIZED HEALTH CARE EDUCATION/TRAINING PROGRAM

## 2023-04-03 PROCEDURE — 160041 HCHG SURGERY MINUTES - EA ADDL 1 MIN LEVEL 4: Performed by: NEUROLOGICAL SURGERY

## 2023-04-03 PROCEDURE — 99223 1ST HOSP IP/OBS HIGH 75: CPT | Performed by: SURGERY

## 2023-04-03 PROCEDURE — 700105 HCHG RX REV CODE 258: Performed by: NURSE PRACTITIONER

## 2023-04-03 PROCEDURE — 85384 FIBRINOGEN ACTIVITY: CPT

## 2023-04-03 PROCEDURE — 85347 COAGULATION TIME ACTIVATED: CPT

## 2023-04-03 PROCEDURE — 009430Z DRAINAGE OF INTRACRANIAL SUBDURAL SPACE WITH DRAINAGE DEVICE, PERCUTANEOUS APPROACH: ICD-10-PCS | Performed by: NEUROLOGICAL SURGERY

## 2023-04-03 PROCEDURE — 160035 HCHG PACU - 1ST 60 MINS PHASE I: Performed by: NEUROLOGICAL SURGERY

## 2023-04-03 PROCEDURE — 72125 CT NECK SPINE W/O DYE: CPT

## 2023-04-03 PROCEDURE — 82077 ASSAY SPEC XCP UR&BREATH IA: CPT

## 2023-04-03 PROCEDURE — 85025 COMPLETE CBC W/AUTO DIFF WBC: CPT

## 2023-04-03 PROCEDURE — 85576 BLOOD PLATELET AGGREGATION: CPT | Mod: 91

## 2023-04-03 PROCEDURE — 302135 SEQUENTIAL COMPRESSION MACHINE: Performed by: SURGERY

## 2023-04-03 PROCEDURE — 110454 HCHG SHELL REV 250: Performed by: NEUROLOGICAL SURGERY

## 2023-04-03 PROCEDURE — 80048 BASIC METABOLIC PNL TOTAL CA: CPT

## 2023-04-03 PROCEDURE — 70450 CT HEAD/BRAIN W/O DYE: CPT

## 2023-04-03 PROCEDURE — 00214 ANES ICR PX BURR HOLES: CPT | Performed by: STUDENT IN AN ORGANIZED HEALTH CARE EDUCATION/TRAINING PROGRAM

## 2023-04-03 PROCEDURE — 85730 THROMBOPLASTIN TIME PARTIAL: CPT

## 2023-04-03 PROCEDURE — 700111 HCHG RX REV CODE 636 W/ 250 OVERRIDE (IP): Performed by: STUDENT IN AN ORGANIZED HEALTH CARE EDUCATION/TRAINING PROGRAM

## 2023-04-03 PROCEDURE — 700102 HCHG RX REV CODE 250 W/ 637 OVERRIDE(OP): Performed by: NURSE PRACTITIONER

## 2023-04-03 PROCEDURE — 84100 ASSAY OF PHOSPHORUS: CPT

## 2023-04-03 PROCEDURE — 160002 HCHG RECOVERY MINUTES (STAT): Performed by: NEUROLOGICAL SURGERY

## 2023-04-03 PROCEDURE — 700101 HCHG RX REV CODE 250: Performed by: NURSE PRACTITIONER

## 2023-04-03 PROCEDURE — 700105 HCHG RX REV CODE 258: Performed by: STUDENT IN AN ORGANIZED HEALTH CARE EDUCATION/TRAINING PROGRAM

## 2023-04-03 PROCEDURE — 160029 HCHG SURGERY MINUTES - 1ST 30 MINS LEVEL 4: Performed by: NEUROLOGICAL SURGERY

## 2023-04-03 PROCEDURE — A9270 NON-COVERED ITEM OR SERVICE: HCPCS | Performed by: NURSE PRACTITIONER

## 2023-04-03 PROCEDURE — 99291 CRITICAL CARE FIRST HOUR: CPT

## 2023-04-03 PROCEDURE — 700101 HCHG RX REV CODE 250: Performed by: NEUROLOGICAL SURGERY

## 2023-04-03 PROCEDURE — 160048 HCHG OR STATISTICAL LEVEL 1-5: Performed by: NEUROLOGICAL SURGERY

## 2023-04-03 PROCEDURE — C1713 ANCHOR/SCREW BN/BN,TIS/BN: HCPCS | Performed by: NEUROLOGICAL SURGERY

## 2023-04-03 PROCEDURE — 92523 SPEECH SOUND LANG COMPREHEN: CPT

## 2023-04-03 DEVICE — SCREW STRYK NC 1.5X4MM (6NCX40=240) CONSIGNED QTY 240 PRE-LOAD 80/PK: Type: IMPLANTABLE DEVICE | Status: FUNCTIONAL

## 2023-04-03 DEVICE — PLATE SHUNT LARGE GAP 14MM WITH TAB (6NCX2=12): Type: IMPLANTABLE DEVICE | Status: FUNCTIONAL

## 2023-04-03 RX ORDER — CEFAZOLIN SODIUM 1 G/3ML
INJECTION, POWDER, FOR SOLUTION INTRAMUSCULAR; INTRAVENOUS PRN
Status: DISCONTINUED | OUTPATIENT
Start: 2023-04-03 | End: 2023-04-03 | Stop reason: SURG

## 2023-04-03 RX ORDER — EPHEDRINE SULFATE 50 MG/ML
5 INJECTION, SOLUTION INTRAVENOUS
Status: DISCONTINUED | OUTPATIENT
Start: 2023-04-03 | End: 2023-04-03 | Stop reason: HOSPADM

## 2023-04-03 RX ORDER — BISACODYL 10 MG
10 SUPPOSITORY, RECTAL RECTAL
Status: DISCONTINUED | OUTPATIENT
Start: 2023-04-03 | End: 2023-04-06 | Stop reason: HOSPADM

## 2023-04-03 RX ORDER — ACETAMINOPHEN 325 MG/1
650 TABLET ORAL EVERY 6 HOURS
Status: DISCONTINUED | OUTPATIENT
Start: 2023-04-03 | End: 2023-04-06 | Stop reason: HOSPADM

## 2023-04-03 RX ORDER — HYDRALAZINE HYDROCHLORIDE 20 MG/ML
5 INJECTION INTRAMUSCULAR; INTRAVENOUS
Status: DISCONTINUED | OUTPATIENT
Start: 2023-04-03 | End: 2023-04-03 | Stop reason: HOSPADM

## 2023-04-03 RX ORDER — OXYCODONE HCL 5 MG/5 ML
10 SOLUTION, ORAL ORAL
Status: DISCONTINUED | OUTPATIENT
Start: 2023-04-03 | End: 2023-04-03 | Stop reason: HOSPADM

## 2023-04-03 RX ORDER — ACETAMINOPHEN 325 MG/1
650 TABLET ORAL EVERY 6 HOURS PRN
Status: DISCONTINUED | OUTPATIENT
Start: 2023-04-08 | End: 2023-04-06 | Stop reason: HOSPADM

## 2023-04-03 RX ORDER — SIMVASTATIN 10 MG
10 TABLET ORAL EVERY EVENING
COMMUNITY
Start: 2023-01-07

## 2023-04-03 RX ORDER — HYDROCHLOROTHIAZIDE 12.5 MG/1
12.5 TABLET ORAL
Status: DISCONTINUED | OUTPATIENT
Start: 2023-04-03 | End: 2023-04-03

## 2023-04-03 RX ORDER — HALOPERIDOL 5 MG/ML
1 INJECTION INTRAMUSCULAR
Status: DISCONTINUED | OUTPATIENT
Start: 2023-04-03 | End: 2023-04-03 | Stop reason: HOSPADM

## 2023-04-03 RX ORDER — ENEMA 19; 7 G/133ML; G/133ML
1 ENEMA RECTAL
Status: DISCONTINUED | OUTPATIENT
Start: 2023-04-03 | End: 2023-04-06 | Stop reason: HOSPADM

## 2023-04-03 RX ORDER — HYDROMORPHONE HYDROCHLORIDE 1 MG/ML
0.2 INJECTION, SOLUTION INTRAMUSCULAR; INTRAVENOUS; SUBCUTANEOUS
Status: DISCONTINUED | OUTPATIENT
Start: 2023-04-03 | End: 2023-04-03 | Stop reason: HOSPADM

## 2023-04-03 RX ORDER — LEVETIRACETAM 500 MG/5ML
500 INJECTION, SOLUTION, CONCENTRATE INTRAVENOUS EVERY 12 HOURS
Status: DISCONTINUED | OUTPATIENT
Start: 2023-04-03 | End: 2023-04-06 | Stop reason: HOSPADM

## 2023-04-03 RX ORDER — PHENYLEPHRINE HYDROCHLORIDE 10 MG/ML
INJECTION, SOLUTION INTRAMUSCULAR; INTRAVENOUS; SUBCUTANEOUS PRN
Status: DISCONTINUED | OUTPATIENT
Start: 2023-04-03 | End: 2023-04-03 | Stop reason: SURG

## 2023-04-03 RX ORDER — SODIUM CHLORIDE, SODIUM LACTATE, POTASSIUM CHLORIDE, CALCIUM CHLORIDE 600; 310; 30; 20 MG/100ML; MG/100ML; MG/100ML; MG/100ML
INJECTION, SOLUTION INTRAVENOUS CONTINUOUS
Status: DISCONTINUED | OUTPATIENT
Start: 2023-04-03 | End: 2023-04-03 | Stop reason: HOSPADM

## 2023-04-03 RX ORDER — HYDROMORPHONE HYDROCHLORIDE 1 MG/ML
0.4 INJECTION, SOLUTION INTRAMUSCULAR; INTRAVENOUS; SUBCUTANEOUS
Status: DISCONTINUED | OUTPATIENT
Start: 2023-04-03 | End: 2023-04-03 | Stop reason: HOSPADM

## 2023-04-03 RX ORDER — LEVETIRACETAM 500 MG/1
500 TABLET ORAL EVERY 12 HOURS
Status: DISCONTINUED | OUTPATIENT
Start: 2023-04-03 | End: 2023-04-06 | Stop reason: HOSPADM

## 2023-04-03 RX ORDER — LABETALOL HYDROCHLORIDE 5 MG/ML
5 INJECTION, SOLUTION INTRAVENOUS
Status: DISCONTINUED | OUTPATIENT
Start: 2023-04-03 | End: 2023-04-03 | Stop reason: HOSPADM

## 2023-04-03 RX ORDER — LISINOPRIL 10 MG/1
10 TABLET ORAL DAILY
COMMUNITY
End: 2023-12-21

## 2023-04-03 RX ORDER — POTASSIUM CHLORIDE 20 MEQ/1
20 TABLET, EXTENDED RELEASE ORAL
Status: DISCONTINUED | OUTPATIENT
Start: 2023-04-03 | End: 2023-04-06 | Stop reason: HOSPADM

## 2023-04-03 RX ORDER — LIDOCAINE HYDROCHLORIDE 20 MG/ML
INJECTION, SOLUTION EPIDURAL; INFILTRATION; INTRACAUDAL; PERINEURAL PRN
Status: DISCONTINUED | OUTPATIENT
Start: 2023-04-03 | End: 2023-04-03 | Stop reason: SURG

## 2023-04-03 RX ORDER — SODIUM CHLORIDE 9 MG/ML
INJECTION, SOLUTION INTRAVENOUS CONTINUOUS
Status: DISCONTINUED | OUTPATIENT
Start: 2023-04-03 | End: 2023-04-04

## 2023-04-03 RX ORDER — FUROSEMIDE 40 MG/1
40 TABLET ORAL DAILY
COMMUNITY

## 2023-04-03 RX ORDER — PROMETHAZINE HYDROCHLORIDE 25 MG/1
12.5 SUPPOSITORY RECTAL EVERY 6 HOURS PRN
Status: DISCONTINUED | OUTPATIENT
Start: 2023-04-03 | End: 2023-04-06 | Stop reason: HOSPADM

## 2023-04-03 RX ORDER — AMOXICILLIN 250 MG
1 CAPSULE ORAL
Status: DISCONTINUED | OUTPATIENT
Start: 2023-04-03 | End: 2023-04-06 | Stop reason: HOSPADM

## 2023-04-03 RX ORDER — ONDANSETRON 2 MG/ML
4 INJECTION INTRAMUSCULAR; INTRAVENOUS
Status: DISCONTINUED | OUTPATIENT
Start: 2023-04-03 | End: 2023-04-03 | Stop reason: HOSPADM

## 2023-04-03 RX ORDER — ONDANSETRON 2 MG/ML
4 INJECTION INTRAMUSCULAR; INTRAVENOUS EVERY 4 HOURS PRN
Status: DISCONTINUED | OUTPATIENT
Start: 2023-04-03 | End: 2023-04-06 | Stop reason: HOSPADM

## 2023-04-03 RX ORDER — UBIDECARENONE 75 MG
100 CAPSULE ORAL DAILY
COMMUNITY
End: 2023-12-21

## 2023-04-03 RX ORDER — LISINOPRIL 20 MG/1
40 TABLET ORAL DAILY
Status: DISCONTINUED | OUTPATIENT
Start: 2023-04-03 | End: 2023-04-06 | Stop reason: HOSPADM

## 2023-04-03 RX ORDER — ONDANSETRON 4 MG/1
4 TABLET, ORALLY DISINTEGRATING ORAL EVERY 4 HOURS PRN
Status: DISCONTINUED | OUTPATIENT
Start: 2023-04-03 | End: 2023-04-06 | Stop reason: HOSPADM

## 2023-04-03 RX ORDER — HYDROMORPHONE HYDROCHLORIDE 1 MG/ML
0.25 INJECTION, SOLUTION INTRAMUSCULAR; INTRAVENOUS; SUBCUTANEOUS
Status: DISCONTINUED | OUTPATIENT
Start: 2023-04-03 | End: 2023-04-06 | Stop reason: HOSPADM

## 2023-04-03 RX ORDER — OMEPRAZOLE 20 MG/1
20 CAPSULE, DELAYED RELEASE ORAL DAILY
Status: DISCONTINUED | OUTPATIENT
Start: 2023-04-03 | End: 2023-04-06 | Stop reason: HOSPADM

## 2023-04-03 RX ORDER — SIMVASTATIN 20 MG
20 TABLET ORAL EVERY EVENING
Status: DISCONTINUED | OUTPATIENT
Start: 2023-04-03 | End: 2023-04-06 | Stop reason: HOSPADM

## 2023-04-03 RX ORDER — CHLORAL HYDRATE 500 MG
1000 CAPSULE ORAL DAILY
Status: ON HOLD | COMMUNITY
End: 2023-04-06

## 2023-04-03 RX ORDER — SODIUM CHLORIDE, SODIUM LACTATE, POTASSIUM CHLORIDE, CALCIUM CHLORIDE 600; 310; 30; 20 MG/100ML; MG/100ML; MG/100ML; MG/100ML
INJECTION, SOLUTION INTRAVENOUS
Status: DISCONTINUED | OUTPATIENT
Start: 2023-04-03 | End: 2023-04-03 | Stop reason: SURG

## 2023-04-03 RX ORDER — BUPIVACAINE HYDROCHLORIDE AND EPINEPHRINE 5; 5 MG/ML; UG/ML
INJECTION, SOLUTION EPIDURAL; INTRACAUDAL; PERINEURAL
Status: DISCONTINUED | OUTPATIENT
Start: 2023-04-03 | End: 2023-04-03 | Stop reason: HOSPADM

## 2023-04-03 RX ORDER — HYDROMORPHONE HYDROCHLORIDE 1 MG/ML
0.1 INJECTION, SOLUTION INTRAMUSCULAR; INTRAVENOUS; SUBCUTANEOUS
Status: DISCONTINUED | OUTPATIENT
Start: 2023-04-03 | End: 2023-04-03 | Stop reason: HOSPADM

## 2023-04-03 RX ORDER — OXYCODONE HCL 5 MG/5 ML
5 SOLUTION, ORAL ORAL
Status: DISCONTINUED | OUTPATIENT
Start: 2023-04-03 | End: 2023-04-03 | Stop reason: HOSPADM

## 2023-04-03 RX ORDER — DOCUSATE SODIUM 100 MG/1
100 CAPSULE, LIQUID FILLED ORAL 2 TIMES DAILY
Status: DISCONTINUED | OUTPATIENT
Start: 2023-04-03 | End: 2023-04-06 | Stop reason: HOSPADM

## 2023-04-03 RX ORDER — EPHEDRINE SULFATE 50 MG/ML
INJECTION, SOLUTION INTRAVENOUS PRN
Status: DISCONTINUED | OUTPATIENT
Start: 2023-04-03 | End: 2023-04-03 | Stop reason: SURG

## 2023-04-03 RX ORDER — DEXAMETHASONE SODIUM PHOSPHATE 4 MG/ML
INJECTION, SOLUTION INTRA-ARTICULAR; INTRALESIONAL; INTRAMUSCULAR; INTRAVENOUS; SOFT TISSUE PRN
Status: DISCONTINUED | OUTPATIENT
Start: 2023-04-03 | End: 2023-04-03

## 2023-04-03 RX ORDER — POTASSIUM CHLORIDE 20 MEQ/1
20 TABLET, EXTENDED RELEASE ORAL DAILY
Status: ON HOLD | COMMUNITY
End: 2023-04-06

## 2023-04-03 RX ORDER — ALBUTEROL SULFATE 2.5 MG/3ML
2.5 SOLUTION RESPIRATORY (INHALATION)
Status: DISCONTINUED | OUTPATIENT
Start: 2023-04-03 | End: 2023-04-03 | Stop reason: HOSPADM

## 2023-04-03 RX ORDER — ONDANSETRON 2 MG/ML
4 INJECTION INTRAMUSCULAR; INTRAVENOUS EVERY 4 HOURS PRN
Status: DISCONTINUED | OUTPATIENT
Start: 2023-04-03 | End: 2023-04-03

## 2023-04-03 RX ORDER — ROCURONIUM BROMIDE 10 MG/ML
INJECTION, SOLUTION INTRAVENOUS PRN
Status: DISCONTINUED | OUTPATIENT
Start: 2023-04-03 | End: 2023-04-03 | Stop reason: SURG

## 2023-04-03 RX ORDER — DIPHENHYDRAMINE HYDROCHLORIDE 50 MG/ML
12.5 INJECTION INTRAMUSCULAR; INTRAVENOUS
Status: DISCONTINUED | OUTPATIENT
Start: 2023-04-03 | End: 2023-04-03 | Stop reason: HOSPADM

## 2023-04-03 RX ORDER — METOPROLOL SUCCINATE 50 MG/1
50 TABLET, EXTENDED RELEASE ORAL DAILY
Status: DISCONTINUED | OUTPATIENT
Start: 2023-04-03 | End: 2023-04-06 | Stop reason: HOSPADM

## 2023-04-03 RX ORDER — OXYCODONE HYDROCHLORIDE 5 MG/1
2.5 TABLET ORAL
Status: DISCONTINUED | OUTPATIENT
Start: 2023-04-03 | End: 2023-04-06 | Stop reason: HOSPADM

## 2023-04-03 RX ORDER — AMOXICILLIN 250 MG
1 CAPSULE ORAL NIGHTLY
Status: DISCONTINUED | OUTPATIENT
Start: 2023-04-03 | End: 2023-04-06 | Stop reason: HOSPADM

## 2023-04-03 RX ORDER — OXYCODONE HYDROCHLORIDE 5 MG/1
5 TABLET ORAL
Status: DISCONTINUED | OUTPATIENT
Start: 2023-04-03 | End: 2023-04-06 | Stop reason: HOSPADM

## 2023-04-03 RX ORDER — POLYETHYLENE GLYCOL 3350 17 G/17G
1 POWDER, FOR SOLUTION ORAL 2 TIMES DAILY
Status: DISCONTINUED | OUTPATIENT
Start: 2023-04-03 | End: 2023-04-06 | Stop reason: HOSPADM

## 2023-04-03 RX ADMIN — THIAMINE HYDROCHLORIDE: 100 INJECTION, SOLUTION INTRAMUSCULAR; INTRAVENOUS at 04:10

## 2023-04-03 RX ADMIN — POTASSIUM CHLORIDE 20 MEQ: 1500 TABLET, EXTENDED RELEASE ORAL at 06:25

## 2023-04-03 RX ADMIN — ACETAMINOPHEN 650 MG: 325 TABLET, FILM COATED ORAL at 05:56

## 2023-04-03 RX ADMIN — LEVETIRACETAM 500 MG: 500 TABLET, FILM COATED ORAL at 17:56

## 2023-04-03 RX ADMIN — LISINOPRIL 40 MG: 20 TABLET ORAL at 06:24

## 2023-04-03 RX ADMIN — PROPOFOL 120 MG: 10 INJECTION, EMULSION INTRAVENOUS at 09:18

## 2023-04-03 RX ADMIN — SIMVASTATIN 20 MG: 20 TABLET, FILM COATED ORAL at 17:56

## 2023-04-03 RX ADMIN — METOPROLOL SUCCINATE 50 MG: 50 TABLET, EXTENDED RELEASE ORAL at 07:43

## 2023-04-03 RX ADMIN — ACETAMINOPHEN 650 MG: 325 TABLET, FILM COATED ORAL at 17:56

## 2023-04-03 RX ADMIN — EPHEDRINE SULFATE 5 MG: 50 INJECTION, SOLUTION INTRAVENOUS at 09:34

## 2023-04-03 RX ADMIN — EPHEDRINE SULFATE 5 MG: 50 INJECTION, SOLUTION INTRAVENOUS at 09:26

## 2023-04-03 RX ADMIN — EPHEDRINE SULFATE 5 MG: 50 INJECTION, SOLUTION INTRAVENOUS at 09:48

## 2023-04-03 RX ADMIN — SODIUM CHLORIDE: 9 INJECTION, SOLUTION INTRAVENOUS at 07:32

## 2023-04-03 RX ADMIN — LEVETIRACETAM 500 MG: 500 TABLET, FILM COATED ORAL at 05:56

## 2023-04-03 RX ADMIN — FENTANYL CITRATE 50 MCG: 50 INJECTION, SOLUTION INTRAMUSCULAR; INTRAVENOUS at 09:13

## 2023-04-03 RX ADMIN — DOCUSATE SODIUM 100 MG: 100 CAPSULE, LIQUID FILLED ORAL at 05:57

## 2023-04-03 RX ADMIN — SENNOSIDES AND DOCUSATE SODIUM 1 TABLET: 50; 8.6 TABLET ORAL at 20:28

## 2023-04-03 RX ADMIN — OMEPRAZOLE 20 MG: 20 CAPSULE, DELAYED RELEASE ORAL at 06:24

## 2023-04-03 RX ADMIN — ACETAMINOPHEN 650 MG: 325 TABLET, FILM COATED ORAL at 12:12

## 2023-04-03 RX ADMIN — CEFAZOLIN 2 G: 330 INJECTION, POWDER, FOR SOLUTION INTRAMUSCULAR; INTRAVENOUS at 09:18

## 2023-04-03 RX ADMIN — SODIUM CHLORIDE, POTASSIUM CHLORIDE, SODIUM LACTATE AND CALCIUM CHLORIDE: 600; 310; 30; 20 INJECTION, SOLUTION INTRAVENOUS at 09:13

## 2023-04-03 RX ADMIN — ROCURONIUM BROMIDE 50 MG: 10 INJECTION, SOLUTION INTRAVENOUS at 09:18

## 2023-04-03 RX ADMIN — PHENYLEPHRINE HYDROCHLORIDE 100 MCG: 10 INJECTION INTRAVENOUS at 09:36

## 2023-04-03 RX ADMIN — LIDOCAINE HYDROCHLORIDE 100 MG: 20 INJECTION, SOLUTION EPIDURAL; INFILTRATION; INTRACAUDAL at 09:18

## 2023-04-03 ASSESSMENT — COGNITIVE AND FUNCTIONAL STATUS - GENERAL
SUGGESTED CMS G CODE MODIFIER MOBILITY: CJ
DAILY ACTIVITIY SCORE: 24
MOBILITY SCORE: 21
SUGGESTED CMS G CODE MODIFIER DAILY ACTIVITY: CH
WALKING IN HOSPITAL ROOM: A LITTLE
CLIMB 3 TO 5 STEPS WITH RAILING: A LITTLE
MOVING FROM LYING ON BACK TO SITTING ON SIDE OF FLAT BED: A LITTLE

## 2023-04-03 ASSESSMENT — FIBROSIS 4 INDEX
FIB4 SCORE: 1.88
FIB4 SCORE: 2.74

## 2023-04-03 ASSESSMENT — PAIN DESCRIPTION - PAIN TYPE
TYPE: SURGICAL PAIN
TYPE: ACUTE PAIN
TYPE: ACUTE PAIN
TYPE: SURGICAL PAIN
TYPE: ACUTE PAIN;SURGICAL PAIN
TYPE: ACUTE PAIN

## 2023-04-03 ASSESSMENT — PATIENT HEALTH QUESTIONNAIRE - PHQ9
SUM OF ALL RESPONSES TO PHQ9 QUESTIONS 1 AND 2: 0
1. LITTLE INTEREST OR PLEASURE IN DOING THINGS: NOT AT ALL
2. FEELING DOWN, DEPRESSED, IRRITABLE, OR HOPELESS: NOT AT ALL

## 2023-04-03 ASSESSMENT — LIFESTYLE VARIABLES
TOTAL SCORE: 0
EVER FELT BAD OR GUILTY ABOUT YOUR DRINKING: NO
TOTAL SCORE: 0
TOTAL SCORE: 0
HAVE YOU EVER FELT YOU SHOULD CUT DOWN ON YOUR DRINKING: NO
CONSUMPTION TOTAL: POSITIVE
AVERAGE NUMBER OF DAYS PER WEEK YOU HAVE A DRINK CONTAINING ALCOHOL: 3
HOW MANY TIMES IN THE PAST YEAR HAVE YOU HAD 5 OR MORE DRINKS IN A DAY: 10
DOES PATIENT WANT TO STOP DRINKING: NO
ALCOHOL_USE: YES
EVER HAD A DRINK FIRST THING IN THE MORNING TO STEADY YOUR NERVES TO GET RID OF A HANGOVER: NO
ON A TYPICAL DAY WHEN YOU DRINK ALCOHOL HOW MANY DRINKS DO YOU HAVE: 3
HAVE PEOPLE ANNOYED YOU BY CRITICIZING YOUR DRINKING: NO

## 2023-04-03 ASSESSMENT — COPD QUESTIONNAIRES
HAVE YOU SMOKED AT LEAST 100 CIGARETTES IN YOUR ENTIRE LIFE: YES
COPD SCREENING SCORE: 8
DO YOU EVER COUGH UP ANY MUCUS OR PHLEGM?: YES, EVERY DAY
DURING THE PAST 4 WEEKS HOW MUCH DID YOU FEEL SHORT OF BREATH: SOME OF THE TIME

## 2023-04-03 ASSESSMENT — ENCOUNTER SYMPTOMS
GASTROINTESTINAL NEGATIVE: 1
EYES NEGATIVE: 1
RESPIRATORY NEGATIVE: 1
MUSCULOSKELETAL NEGATIVE: 1
CARDIOVASCULAR NEGATIVE: 1
CONSTITUTIONAL NEGATIVE: 1
NEUROLOGICAL NEGATIVE: 1
PSYCHIATRIC NEGATIVE: 1

## 2023-04-03 NOTE — ED PROVIDER NOTES
ER Provider Note    Scribed for Omkar Marques II, M.D.,  by Gibson Hadley. 4/3/2023  2:03 AM    Primary Care Provider: Orlin Chacon M.D.    CHIEF COMPLAINT  Chief Complaint   Patient presents with    T-5000 Head Injury     Brought in by remsa from [facility] c/o Head Injury s/p GLF. Per report witnessed GLF. Patient fell backwards and hit head on the floor. + LOC unknown amount of time. + ETOH takes Aspirin.     EXTERNAL RECORDS REVIEWED  Patient was seen in office by Dr. Skinner (Ortho) for lumbar pain.     HPI/ROS  LIMITATION TO HISTORY   Intoxication  OUTSIDE HISTORIAN(S):  EMS see HPI    Lily Lloyd is a 87 y.o. female who presents to the ED via EMS secondary to a TBI prior to arrival. EMS reports that while at the [facility] she had a witnessed fall with bystanders stating that after her fall she got up and immediately fell again with loss of consciousness. She admit to drinking alcohol. EMS reports no ambulation after the fall and report that she only complained of dizziness once but denies any current complaints. EMS reports that she has a history of A-Fib.    PAST MEDICAL HISTORY  Past Medical History:   Diagnosis Date    Arthritis     every where    Breath shortness     EXERCISE    CATARACT     Diabetes     Told pre-diabetic 2 yrs ago, diet controlled    Heart burn     takes protonix occasionally    Hiatus hernia syndrome     Hypertension     controlled on meds    Indigestion     Acid Reflux    Pain     Renal disorder     currently being worked up.     Snoring     Does not want to have evaluated at this time     SURGICAL HISTORY  Past Surgical History:   Procedure Laterality Date    CATARACT PHACO WITH IOL  8/20/2013    Performed by Alejandro Peres M.D. at SURGERY [facility] ORS    CATARACT PHACO WITH IOL  8/6/2013    Performed by Alejandro Peres M.D. at SURGERY [facility] ORS    SINUSOTOMIES  12/20/2012    Performed by Anderson Rolle M.D. at SURGERY [facility]     NASAL POLYPECTOMY  12/20/2012    Performed by Hattie Mcmahon M.D. at SURGERY SAME DAY [facility] ORS    NASAL POLYPECTOMY  8/19/2010    Performed by HATTIE MCMAHON at SURGERY SAME DAY [facility] ORS    ETHMOIDECTOMY  8/19/2010    Performed by HATTIE MCMAHON at SURGERY SAME DAY [facility] ORS    ANTROSTOMY  8/19/2010    [facility]    ABDOMINAL HYSTERECTOMY TOTAL      APPENDECTOMY      KNEE ARTHROPLASTY TOTAL      right    OOPHORECTOMY       FAMILY HISTORY  Family History   Problem Relation Age of Onset    Cancer Sister         breast cancer    Cancer Paternal Aunt         breast cancer    Diabetes Father     Cancer Sister      SOCIAL HISTORY   reports that she has been smoking cigarettes. She has a 25.00 pack-year smoking history. She uses smokeless tobacco. She reports current alcohol use of about 1.0 oz per week. She reports that she does not use drugs.    CURRENT MEDICATIONS  Previous Medications    ACETAMINOPHEN (Q-NOL) 325 MG TAB    Take 1 Tab by mouth every four hours as needed.    ALBUTEROL (VENTOLIN OR PROVENTIL) 108 (90 BASE) MCG/ACT AERS INHALATION AEROSOL    Inhale 2 Puffs by mouth every 6 hours as needed for Shortness of Breath.    CHOLECALCIFEROL (VITAMIN D3) 2000 UNIT CAPS    Take 1 Cap by mouth every day.    DOCOSAHEXANOIC ACID (OMEGA 3 FA) 1000 MG CAPS    Take 1,000 mg by mouth every day.      OTGITHK-MRLPLTLIXQ-NWM (ROBITUSSIN CF) 30- MG/5ML LIQD    Take 10 mL by mouth every four hours as needed for Cough.    GUAIFENESIN-CODEINE (TUSSI-ORGANIDIN NR) 100-10 MG/5ML SYRUP    Take 5 mL by mouth 3 times a day as needed for Cough.    HYDROCHLOROTHIAZIDE (HYDRODIURIL) 12.5 MG TABLET    TAKE 1 TABLET BY MOUTH EVERY DAY    LISINOPRIL (PRINIVIL, ZESTRIL) 40 MG TABLET    Take 1 Tab by mouth every day.    METOPROLOL SR (TOPROL XL) 50 MG TABLET SR 24 HR    Take 1 Tab by mouth every day.    MULTIPLE VITAMINS-MINERALS (MULTIVITAMIN PO)    Take 1 Tab by mouth  "every day.    PANTOPRAZOLE (PROTONIX) 40 MG TBEC    Take 1 Tab by mouth every day.    POTASSIUM CHLORIDE SA (K-DUR) 20 MEQ TAB CR    TAKE 1 TABLET BY MOUTH EVERY DAY    SIMVASTATIN (ZOCOR) 40 MG TAB    TAKE 1/2 TABLET BY MOUTH EVERY EVENING    TIZANIDINE (ZANAFLEX) 4 MG TAB    TAKE 1 TABLET BY MOUTH EVERY DAY     ALLERGIES  Pcn [penicillins], Sulfa drugs, and Tape    PHYSICAL EXAM  BP (!) 145/80   Pulse 63   Temp 37.1 °C (98.7 °F) (Temporal)   Resp 17   Ht 1.575 m (5' 2\")   Wt 63.5 kg (140 lb)   SpO2 97%      Physical Exam  Vitals and nursing note reviewed.   HENT:      Head:      Comments: Right parietal occipital scalp hematoma     Nose: Nose normal.      Mouth/Throat:      Mouth: Mucous membranes are moist.   Eyes:      Extraocular Movements: Extraocular movements intact.      Pupils: Pupils are equal, round, and reactive to light.   Cardiovascular:      Rate and Rhythm: Normal rate and regular rhythm.   Pulmonary:      Effort: Pulmonary effort is normal.      Breath sounds: Normal breath sounds.   Abdominal:      Tenderness: There is no abdominal tenderness.   Musculoskeletal:      Comments: Trace edema at ankles   Neurological:      Mental Status: She is alert.      Comments: Slightly slurred speech but speech is understood. Moving all extremities. Oriented to person, place, but not time or situation      DIAGNOSTIC STUDIES    Radiology:   The attending emergency physician has independently interpreted the diagnostic imaging associated with this visit and am waiting the final reading from the radiologist.   Preliminary Interpretation:   2:19 AM CT-Head shows a left-sided acute on chronic subdural hemorrhage  Radiologist interpretation:   CT-CSPINE WITHOUT PLUS RECONS   Final Result         1.  Multilevel degenerative changes of the cervical spine limit diagnostic sensitivity of this examination, otherwise no acute traumatic bony injury of the cervical spine is apparent.   2.  Atherosclerosis      CT-HEAD " W/O   Final Result         1.  11.7 mm acute on subacute appearing lateral hemispheric subdural hematoma with 1.5 mm left right midline shift.   2.  Small calcified mass abutting the right frontal skull, appearance favors a small meningioma   3.  Bilateral maxillary sinusitis changes   4.  Atherosclerosis.      Based solely on CT findings, the brain injury guideline category is mBIG 3.      EDH   IVH   Displaced skull fx   SDH > 8mm   IPH > 8mm or multiple   SAH bi-hemispheric or > 3mm      The original BIG retrospective analysis found radiographic progression in 0% of BIG 1 patients and 2.6% BIG 2.         These findings were discussed with the patient's clinician, Omkar Marques Ii, on 4/3/2023 2:27 AM.      CT-HEAD W/O    (Results Pending)       CARE NARRATIVE & MEDICAL DECISION MAKING     This is an emergent evaluation of a 87 year old woman who was at [facility], drinking alcohol and had a witnessed fall with LOC. Signs of trauma with a scalp hematoma at right scalp. Alert without unilateral deficits. Plan for CT head and cspine.     2:29 AM Dr. Kohli (Radiology) informed me that there is a left-sided subdural hemorrhage approximately 11 mm that appears to have acute and subacute components.  This is a big 3 traumatic brain injury.  Paged Neurosurgery and Trauma.    2:38 AM Patient was seen at bedside and informed of need for admission due to the subdural hemorrhage.  Inquired again if she takes any anticoagulants or aspirin.  She still is not providing a straightforward answer.  TEG pending.    2:43 AM I discussed the patient's case and the above findings with [facility] Rodriguez [facility] who reports that there is no need for neurosurgical intervention at this time.  She will be admitted to the trauma service.    I discussed with Dr. Hu, trauma surgery.  He accepted her for admission to the trauma service.    TEG later returned with increased percentage inhibition AA, likely consistent with aspirin  use.    Alcohol level exceedingly high at 250.    ED Observation Status? No; Patient does not meet criteria for ED Observation.     PROBLEM LIST AND DISPOSITION    #Closed head injury   - Subacute and acute subdural hemorrhage on the left.    #Alcohol intoxication      I have discussed management of the patient with the following physicians and ARINA's: Dr. Kohli (Radiology), Dr. Rodriguez (Neurosurgery), Dr. Hu (Trauma)    Discussion of management with other [facility] or appropriate source(s): Radiologist see above      Barriers to care at this time, including but not limited to: Intoxication      Patient will be hospitalized by Dr. Hu (Trauma)    CRITICAL CARE TIME 30 minutes  There was a very real possibility of deterioration of the patient's condition.  This patient required the highest level of care.  I provided critical care services which included: review of the medical record, treatment orders, ordering and reviewing test results, frequent reevaluation of the patient's condition and response to treatment, as well as discussing the case with appropriate personnel and various consultants. The critical care time associated with the care of this patient is exclusive of any procedures or specific interventions.     FINAL DIAGNOSIS  1. Post-traumatic subdural hematoma, with loss of consciousness of 30 minutes or less, initial encounter (HCC)    2. Alcoholic intoxication without complication (HCC)      The note accurately reflects work and decisions made by me.  Omkar Marques II, M.D.  4/3/2023  7:56 AM     Gibson ROSS (Colleen), am scribing for, and in the presence of, YOU Mckeon II.    Electronically signed by: Gibson Hadley (Colleen), 4/3/2023    Omkar ROSS II, M.D. personally performed the services described in this documentation, as scribed by Gibson Hadley in my presence, and it is both accurate and complete.

## 2023-04-03 NOTE — CONSULTS
Neurosurgery Consultation  4/3/2023 called 0300 seen 0830  Referring MD:  Dr. Jerald JON M.D.  Reason for referral:  left SDH     CHIEF COMPLAINT    Lily Lloyd is a 87 y.o. female who fell at the [facility] She got up and immediately fell again with loss of consciousness. She admit to drinking alcohol. She had slurred speech  in the ER.       PAST MEDICAL HISTORY  Past Medical History        Past Medical History:   Diagnosis Date    Arthritis       every where    Breath shortness       EXERCISE    CATARACT      Diabetes       Told pre-diabetic 2 yrs ago, diet controlled    Heart burn       takes protonix occasionally    Hiatus hernia syndrome      Hypertension       controlled on meds    Indigestion       Acid Reflux    Pain      Renal disorder       currently being worked up.     Snoring       Does not want to have evaluated at this time         SURGICAL HISTORY  Past Surgical History         Past Surgical History:   Procedure Laterality Date    CATARACT PHACO WITH IOL   8/20/2013     Performed by Alejandro Peres M.D. at SURGERY SURGICAL [facility] ORS    CATARACT PHACO WITH IOL   8/6/2013     Performed by Alejandro Peres M.D. at SURGERY SURGICAL [facility] ORS    SINUSOTOMIES   12/20/2012     Performed by Hattie Mcmahon M.D. at SURGERY SAME DAY [facility] ORS    NASAL POLYPECTOMY   12/20/2012     Performed by Hattie Mcmahon M.D. [facility]    NASAL POLYPECTOMY   8/19/2010     Performed by HATTIE MCMAHON [facility]    ETHMOIDECTOMY   8/19/2010     Performed by HATTIE MCMAHON at SURGERY SAME DAY [facility]S    ANTROSTOMY   8/19/2010     Performed by HATTIE MCMAHON [facility]    ABDOMINAL HYSTERECTOMY TOTAL        APPENDECTOMY        KNEE ARTHROPLASTY TOTAL         right    OOPHORECTOMY             FAMILY HISTORY  Family History         Family History   Problem Relation Age of Onset    Cancer Sister           breast cancer    Cancer  "Paternal Aunt           breast cancer    Diabetes Father      Cancer Sister           SOCIAL HISTORY   reports that she has been smoking cigarettes. She has a 25.00 pack-year smoking history. She uses smokeless tobacco. She reports current alcohol use of about 1.0 oz per week. She reports that she does not use drugs.     CURRENT MEDICATIONS       Previous Medications     ACETAMINOPHEN (Q-NOL) 325 MG TAB    Take 1 Tab by mouth every four hours as needed.     ALBUTEROL (VENTOLIN OR PROVENTIL) 108 (90 BASE) MCG/ACT AERS INHALATION AEROSOL    Inhale 2 Puffs by mouth every 6 hours as needed for Shortness of Breath.     CHOLECALCIFEROL (VITAMIN D3) 2000 UNIT CAPS    Take 1 Cap by mouth every day.     DOCOSAHEXANOIC ACID (OMEGA 3 FA) 1000 MG CAPS    Take 1,000 mg by mouth every day.       ACDCYCB-[id number] (ROBITUSSIN CF) 30- MG/5ML LIQD    Take 10 mL by mouth every four hours as needed for Cough.     GUAIFENESIN-CODEINE (TUSSI-ORGANIDIN NR) 100-10 MG/5ML SYRUP    Take 5 mL by mouth 3 times a day as needed for Cough.     HYDROCHLOROTHIAZIDE (HYDRODIURIL) 12.5 MG TABLET    TAKE 1 TABLET BY MOUTH EVERY DAY     LISINOPRIL (PRINIVIL, ZESTRIL) 40 MG TABLET    Take 1 Tab by mouth every day.     METOPROLOL SR (TOPROL XL) 50 MG TABLET SR 24 HR    Take 1 Tab by mouth every day.     MULTIPLE VITAMINS-MINERALS (MULTIVITAMIN PO)    Take 1 Tab by mouth every day.     PANTOPRAZOLE (PROTONIX) 40 MG TBEC    Take 1 Tab by mouth every day.     POTASSIUM CHLORIDE SA (K-DUR) 20 MEQ TAB CR    TAKE 1 TABLET BY MOUTH EVERY DAY     SIMVASTATIN (ZOCOR) 40 MG TAB    TAKE 1/2 TABLET BY MOUTH EVERY EVENING     TIZANIDINE (ZANAFLEX) 4 MG TAB    TAKE 1 TABLET BY MOUTH EVERY DAY      ALLERGIES  Pcn [penicillins], Sulfa drugs, and Tape     PHYSICAL EXAM  BP (!) 145/80   Pulse 63   Temp 37.1 °C (98.7 °F) (Temporal)   Resp 17   Ht 1.575 m (5' 2\")   Wt 63.5 kg (140 lb)   SpO2 97%       Physical Exam  Awake, alert   Speech fluent " appropriate  In no apparent distress  Affect, mood appropriate  Pupils 3 mm midline, reactive.  Conjugate gaze.    Face symmetric  Facial sensation intact light touch  Hearing intact to conversation  Motor:  Bilateral  bicep, tricep, ,                 IP, thigh adduction, thigh abduction, quadriceps, hamstrings, dorsiflexion 5/5 no pronator drift  Sensation:  Intact touch face, neck, torso, four extremities  Reflex:  No Peterson's     DIAGNOSTIC STUDIES    Radiology:   4/3/2023 8:07 AM     HISTORY/REASON FOR EXAM:  Head trauma, mod-severe.        TECHNIQUE/EXAM DESCRIPTION AND NUMBER OF VIEWS:  CT of the head without contrast.     The study was performed on a VIPerks CT scanner. Contiguous 5 mm axial sections were obtained from the skull base through the vertex.     Up to date radiation dose reduction adjustments have been utilized to meet ALARA standards for radiation dose reduction.     COMPARISON:  4/3/2023     FINDINGS:  Predominately low mixed attenuation left frontoparietal subdural hematoma measuring up to 14 mm in greatest thickness. This is unchanged from the previous exam. There is stable minimal left to right midline shift measuring 2 mm. There is no new acute   intracranial hemorrhage.     IMPRESSION:     1.  Stable left frontoparietal acute on subacute to chronic left frontoparietal subdural hematoma measuring up to 14 mm in greatest thickness.  2.  Stable minimal left to right midline shift measuring 2 mm.  3.  No new acute intracranial hemorrhage.    AP:  87 year old female with 14 mm left acute on subacute subdural hematoma with mass effect on the cortical surface, 2 mm midline shift.  Given the size of the hematoma with mass effect, I recommend bur hole drainage to decrease the mass effect.

## 2023-04-03 NOTE — PROGRESS NOTES
Date of Service  4/3/2023    Chief Complaint  87 y.o. female admitted 4/3/2023 with subdural hematoma status mechanical ground level fall.     Interval Events    GCS 15.   Chronic hyponatremia.  Tertiary exam completed.    - Clinically stable at this time, transfer to [facility] when cleared by neurosurgery.  - Counseled.    Review of Systems  Review of Systems   Constitutional: Negative.    HENT: Negative.     Eyes: Negative.    Respiratory: Negative.     Cardiovascular: Negative.    Gastrointestinal: Negative.    Genitourinary: Negative.    Musculoskeletal: Negative.    Skin: Negative.    Neurological: Negative.    Psychiatric/Behavioral: Negative.        Vital Signs  Temp:  [37.1 °C (98.7 °F)] 37.1 °C (98.7 °F)  Pulse:  [63-86] 74  Resp:  [17-27] 24  BP: (117-159)/(56-80) 117/56  SpO2:  [83 %-97 %] 97 %    Physical Exam  Physical Exam  Vitals and nursing note reviewed.   Constitutional:       General: She is awake. She is not in acute distress.     Appearance: Normal appearance. She is well-developed. She is not ill-appearing, toxic-appearing or diaphoretic.   HENT:      Head: Normocephalic and atraumatic.      Nose: Nose normal. No congestion.      Mouth/Throat:      Mouth: Mucous membranes are moist.      Pharynx: Oropharynx is clear.   Eyes:      General:         Right eye: No discharge.         Left eye: No discharge.      Pupils: Pupils are equal, round, and reactive to light.   Cardiovascular:      Rate and Rhythm: Normal rate and regular rhythm.      Pulses: Normal pulses.   Pulmonary:      Effort: No respiratory distress.   Chest:      Chest wall: No tenderness.   Abdominal:      General: There is no distension.      Tenderness: There is no abdominal tenderness. There is no guarding or rebound.   Musculoskeletal:      Comments: Moves all extremities.    Skin:     General: Skin is warm and dry.      Capillary Refill: Capillary refill takes less than 2 seconds.   Neurological:      Mental Status: She is  alert.      GCS: GCS eye subscore is 4. GCS verbal subscore is 5. GCS motor subscore is 6.   Psychiatric:         Mood and Affect: Mood normal.         Behavior: Behavior normal. Behavior is cooperative.         Thought Content: Thought content normal.         Judgment: Judgment normal.       Laboratory  Recent Results (from the past 24 hour(s))   CBC WITH DIFFERENTIAL    Collection Time: 04/03/23  2:53 AM   Result Value Ref Range    WBC 5.5 4.8 - 10.8 K/uL    RBC 4.58 4.20 - 5.40 M/uL    Hemoglobin 12.6 12.0 - 16.0 g/dL    Hematocrit 39.0 37.0 - 47.0 %    MCV 85.2 81.4 - 97.8 fL    MCH 27.5 27.0 - 33.0 pg    MCHC 32.3 (L) 33.6 - 35.0 g/dL    RDW 44.3 35.9 - 50.0 fL    Platelet Count 266 164 - 446 K/uL    MPV 10.3 9.0 - 12.9 fL    Neutrophils-Polys 43.40 (L) 44.00 - 72.00 %    Lymphocytes 44.10 (H) 22.00 - 41.00 %    Monocytes 5.90 0.00 - 13.40 %    Eosinophils 5.70 0.00 - 6.90 %    Basophils 0.50 0.00 - 1.80 %    Immature Granulocytes 0.40 0.00 - 0.90 %    Nucleated RBC 0.00 /100 WBC    Neutrophils (Absolute) 2.37 2.00 - 7.15 K/uL    Lymphs (Absolute) 2.41 1.00 - 4.80 K/uL    Monos (Absolute) 0.32 0.00 - 0.85 K/uL    Eos (Absolute) 0.31 0.00 - 0.51 K/uL    Baso (Absolute) 0.03 0.00 - 0.12 K/uL    Immature Granulocytes (abs) 0.02 0.00 - 0.11 K/uL    NRBC (Absolute) 0.00 K/uL   COMP METABOLIC PANEL    Collection Time: 04/03/23  2:53 AM   Result Value Ref Range    Sodium 127 (L) 135 - 145 mmol/L    Potassium 3.4 (L) 3.6 - 5.5 mmol/L    Chloride 91 (L) 96 - 112 mmol/L    Co2 18 (L) 20 - 33 mmol/L    Anion Gap 18.0 (H) 7.0 - 16.0    Glucose 96 65 - 99 mg/dL    Bun 14 8 - 22 mg/dL    Creatinine 0.99 0.50 - 1.40 mg/dL    Calcium 8.2 (L) 8.5 - 10.5 mg/dL    AST(SGOT) 21 12 - 45 U/L    ALT(SGPT) 13 2 - 50 U/L    Alkaline Phosphatase 83 30 - 99 U/L    Total Bilirubin 0.2 0.1 - 1.5 mg/dL    Albumin 4.0 3.2 - 4.9 g/dL    Total Protein 6.4 6.0 - 8.2 g/dL    Globulin 2.4 1.9 - 3.5 g/dL    A-G Ratio 1.7 g/dL   DIAGNOSTIC ALCOHOL     Collection Time: 04/03/23  2:53 AM   Result Value Ref Range    Diagnostic Alcohol 250.5 (H) <10.1 mg/dL   APTT    Collection Time: 04/03/23  2:53 AM   Result Value Ref Range    APTT 29.7 24.7 - 36.0 sec   PROTHROMBIN TIME (INR)    Collection Time: 04/03/23  2:53 AM   Result Value Ref Range    PT 12.7 12.0 - 14.6 sec    INR 0.96 0.87 - 1.13   Magnesium: Every Monday and Thursday AM    Collection Time: 04/03/23  2:53 AM   Result Value Ref Range    Magnesium 2.0 1.5 - 2.5 mg/dL   Phosphorus: Every Monday and Thursday AM    Collection Time: 04/03/23  2:53 AM   Result Value Ref Range    Phosphorus 3.0 2.5 - 4.5 mg/dL   ESTIMATED GFR    Collection Time: 04/03/23  2:53 AM   Result Value Ref Range    GFR (CKD-EPI) 55 (A) >60 mL/min/1.73 m 2   CORRECTED CALCIUM    Collection Time: 04/03/23  2:53 AM   Result Value Ref Range    Correct Calcium 8.2 (L) 8.5 - 10.5 mg/dL   PLATELET MAPPING WITH BASIC TEG    Collection Time: 04/03/23  4:15 AM   Result Value Ref Range    Reaction Time Initial-R 4.7 4.6 - 9.1 min    React Time Initial Hep 4.6 4.3 - 8.3 min    Clot Kinetics-K 1.3 0.8 - 2.1 min    Clot Angle-Angle 73.3 63.0 - 78.0 degrees    Maximum Clot Strength-MA 60.2 52.0 - 69.0 mm    TEG Functional Fibrinogen(MA) 18.3 15.0 - 32.0 mm    % Inhibition ADP 15.7 0.0 - 17.0 %    % Inhibition AA 29.1 (H) 0.0 - 11.0 %    TEG Algorithm Link Algorithm        Fluids    Intake/Output Summary (Last 24 hours) at 4/3/2023 0650  Last data filed at 4/3/2023 0410  Gross per 24 hour   Intake --   Output 300 ml   Net -300 ml       Core Measures & Quality Metrics  Labs reviewed, Medications reviewed and Radiology images reviewed  Deutsch catheter: No Deutsch      DVT Prophylaxis: Contraindicated - High bleeding risk  DVT prophylaxis - mechanical: SCDs  Ulcer prophylaxis: Not indicated    Assessed for rehab: Patient was assess for and/or received rehabilitation services during this hospitalization  RAP Score Total: 7  CAGE Results: positive Blood  Alcohol>0.08: yes       Assessment complete date: 4/3/2023  Intervention: Complete. Patient response to intervention: 3-4 cocktails a night..   Patient demonstrates understanding of intervention. Patient does not agree to follow-up.   has not been contacted. Follow up with: PCP, Clinic and Self Help Group  Total ETOH intervention time: 15 - 30 mintues    Mental status adequate for full examination?: Yes    Spine cleared (radiologically and/or clinically): Yes    All current laboratory studies/radiology exams reviewed: Yes    Medications reconciliation has been reviewed: Yes    Completed Consultations:  Neurosurgery, recommendations pending.     Pending Consultations:    Newly Identified Diagnoses and Injuries:    Assessment/Plan  * Trauma- (present on admission)  Assessment & Plan  GLF. Fell backwards striking her head.  TBI Alert.  Josh Hu DO. Trauma Surgery.    Subdural hematoma, post-traumatic (HCC)- (present on admission)  Assessment & Plan  11.7 mm acute on subacute appearing lateral hemispheric subdural hematoma with 1.5 mm left right midline shift.  Repeat head CT in 6 hrs.  Non-operative management.  Post traumatic pharmacologic seizure prophylaxis for 1 week.  Speech Language Pathology cognitive evaluation.  Shmuel Rodriguez MD, PhD. Neurosurgeon. [facility].     Hyponatremia- (present on admission)  Assessment & Plan  Likely chronic. Admission serum Na 127 mmol/L  NaCl infusion. Hydrochlorothiazide stopped.  Trend labs.    Alcohol abuse- (present on admission)  Assessment & Plan  Admission blood alcohol level of 250.  Trauma alcohol withdrawal protocol initiated.  Alcohol withdrawal surveillance.  4/3 Brief intervention completed.    Aspirin long-term use- (present on admission)  Assessment & Plan  Thromboelastogram with 29.1% AA inhibition and 15.7 ADP inhibition.    Contraindication to deep vein thrombosis (DVT) prophylaxis- (present on admission)  Assessment &  Plan  Prophylactic anticoagulation for thrombotic prevention initially contraindicated secondary to elevated bleeding risk.  4/3 Trauma surveillance venous duplex scanning ordered.    Hypokalemia- (present on admission)  Assessment & Plan  Chronic condition treated with potassium chloride SA.  Resumed maintenance medication on admission.   Trend labs.    Dyslipidemia- (present on admission)  Assessment & Plan  Chronic condition treated with simvastatin.  Resumed maintenance medication on admission.    GERD (gastroesophageal reflux disease)- (present on admission)  Assessment & Plan  Chronic condition treated with pantoprazole.  Resumed maintenance medication on admission.     Essential hypertension- (present on admission)  Assessment & Plan  Chronic condition treated with metoprolol, hydrochlorothiazide, lisinopril.  Resumed maintenance medications on admission with the exception of hydrochlorothiazide secondary to hyponatremia..         Discussed patient condition with Patient and trauma surgery, Dr. Emiliano Smith.

## 2023-04-03 NOTE — PROGRESS NOTES
Trauma / Surgical Daily Progress Note    Date of Service  4/3/2023    Chief Complaint  87 y.o. female admitted 4/3/2023 with subdural hematoma after a fall while intoxicated with alcohol    Interval Events  New admission with above.  Seen by consulting services, recommendations noted and appreciated.  OR this morning for stevie holes and drain placement.    Review of Systems  Review of Systems     Vital Signs for last 24 hours  Temp:  [36.2 °C (97.2 °F)-37.1 °C (98.7 °F)] 36.3 °C (97.4 °F)  Pulse:  [63-86] 77  Resp:  [11-33] 19  BP: ()/(41-80) 150/64  SpO2:  [83 %-99 %] 96 %    Hemodynamic parameters for last 24 hours       Respiratory Data     Respiration: 19, Pulse Oximetry: 96 %        RUL Breath Sounds: Expiratory Wheezes, RML Breath Sounds: Expiratory Wheezes, RLL Breath Sounds: Diminished, ANTHONY Breath Sounds: Expiratory Wheezes, LLL Breath Sounds: Diminished    Physical Exam  Physical Exam  Vitals and nursing note reviewed.   Constitutional:       Appearance: Normal appearance.   HENT:      Head:      Comments: Dressings in place  Drain in place     Right Ear: External ear normal.      Left Ear: External ear normal.      Nose: Nose normal.      Mouth/Throat:      Mouth: Mucous membranes are moist.      Pharynx: Oropharynx is clear.   Eyes:      Conjunctiva/sclera: Conjunctivae normal.      Pupils: Pupils are equal, round, and reactive to light.   Cardiovascular:      Rate and Rhythm: Normal rate and regular rhythm.      Pulses: Normal pulses.      Heart sounds: Normal heart sounds.   Pulmonary:      Effort: Pulmonary effort is normal.      Breath sounds: Normal breath sounds.   Abdominal:      General: Abdomen is flat.      Palpations: Abdomen is soft.   Musculoskeletal:         General: No swelling. Normal range of motion.      Cervical back: Normal range of motion and neck supple.   Skin:     General: Skin is warm and dry.      Capillary Refill: Capillary refill takes 2 to 3 seconds.   Neurological:       Mental Status: She is alert and oriented to person, place, and time.   Psychiatric:         Mood and Affect: Mood normal.         Behavior: Behavior normal.       Laboratory  Recent Results (from the past 24 hour(s))   CBC WITH DIFFERENTIAL    Collection Time: 04/03/23  2:53 AM   Result Value Ref Range    WBC 5.5 4.8 - 10.8 K/uL    RBC 4.58 4.20 - 5.40 M/uL    Hemoglobin 12.6 12.0 - 16.0 g/dL    Hematocrit 39.0 37.0 - 47.0 %    MCV 85.2 81.4 - 97.8 fL    MCH 27.5 27.0 - 33.0 pg    MCHC 32.3 (L) 33.6 - 35.0 g/dL    RDW 44.3 35.9 - 50.0 fL    Platelet Count 266 164 - 446 K/uL    MPV 10.3 9.0 - 12.9 fL    Neutrophils-Polys 43.40 (L) 44.00 - 72.00 %    Lymphocytes 44.10 (H) 22.00 - 41.00 %    Monocytes 5.90 0.00 - 13.40 %    Eosinophils 5.70 0.00 - 6.90 %    Basophils 0.50 0.00 - 1.80 %    Immature Granulocytes 0.40 0.00 - 0.90 %    Nucleated RBC 0.00 /100 WBC    Neutrophils (Absolute) 2.37 2.00 - 7.15 K/uL    Lymphs (Absolute) 2.41 1.00 - 4.80 K/uL    Monos (Absolute) 0.32 0.00 - 0.85 K/uL    Eos (Absolute) 0.31 0.00 - 0.51 K/uL    Baso (Absolute) 0.03 0.00 - 0.12 K/uL    Immature Granulocytes (abs) 0.02 0.00 - 0.11 K/uL    NRBC (Absolute) 0.00 K/uL   COMP METABOLIC PANEL    Collection Time: 04/03/23  2:53 AM   Result Value Ref Range    Sodium 127 (L) 135 - 145 mmol/L    Potassium 3.4 (L) 3.6 - 5.5 mmol/L    Chloride 91 (L) 96 - 112 mmol/L    Co2 18 (L) 20 - 33 mmol/L    Anion Gap 18.0 (H) 7.0 - 16.0    Glucose 96 65 - 99 mg/dL    Bun 14 8 - 22 mg/dL    Creatinine 0.99 0.50 - 1.40 mg/dL    Calcium 8.2 (L) 8.5 - 10.5 mg/dL    AST(SGOT) 21 12 - 45 U/L    ALT(SGPT) 13 2 - 50 U/L    Alkaline Phosphatase 83 30 - 99 U/L    Total Bilirubin 0.2 0.1 - 1.5 mg/dL    Albumin 4.0 3.2 - 4.9 g/dL    Total Protein 6.4 6.0 - 8.2 g/dL    Globulin 2.4 1.9 - 3.5 g/dL    A-G Ratio 1.7 g/dL   DIAGNOSTIC ALCOHOL    Collection Time: 04/03/23  2:53 AM   Result Value Ref Range    Diagnostic Alcohol 250.5 (H) <10.1 mg/dL   APTT    Collection  Time: 04/03/23  2:53 AM   Result Value Ref Range    APTT 29.7 24.7 - 36.0 sec   PROTHROMBIN TIME (INR)    Collection Time: 04/03/23  2:53 AM   Result Value Ref Range    PT 12.7 12.0 - 14.6 sec    INR 0.96 0.87 - 1.13   Magnesium: Every Monday and Thursday AM    Collection Time: 04/03/23  2:53 AM   Result Value Ref Range    Magnesium 2.0 1.5 - 2.5 mg/dL   Phosphorus: Every Monday and Thursday AM    Collection Time: 04/03/23  2:53 AM   Result Value Ref Range    Phosphorus 3.0 2.5 - 4.5 mg/dL   ESTIMATED GFR    Collection Time: 04/03/23  2:53 AM   Result Value Ref Range    GFR (CKD-EPI) 55 (A) >60 mL/min/1.73 m 2   CORRECTED CALCIUM    Collection Time: 04/03/23  2:53 AM   Result Value Ref Range    Correct Calcium 8.2 (L) 8.5 - 10.5 mg/dL   PLATELET MAPPING WITH BASIC TEG    Collection Time: 04/03/23  4:15 AM   Result Value Ref Range    Reaction Time Initial-R 4.7 4.6 - 9.1 min    React Time Initial Hep 4.6 4.3 - 8.3 min    Clot Kinetics-K 1.3 0.8 - 2.1 min    Clot Angle-Angle 73.3 63.0 - 78.0 degrees    Maximum Clot Strength-MA 60.2 52.0 - 69.0 mm    TEG Functional Fibrinogen(MA) 18.3 15.0 - 32.0 mm    % Inhibition ADP 15.7 0.0 - 17.0 %    % Inhibition AA 29.1 (H) 0.0 - 11.0 %    TEG Algorithm Link Algorithm    Basic Metabolic Panel    Collection Time: 04/03/23 11:21 AM   Result Value Ref Range    Sodium 131 (L) 135 - 145 mmol/L    Potassium 4.0 3.6 - 5.5 mmol/L    Chloride 98 96 - 112 mmol/L    Co2 18 (L) 20 - 33 mmol/L    Glucose 86 65 - 99 mg/dL    Bun 11 8 - 22 mg/dL    Creatinine 0.94 0.50 - 1.40 mg/dL    Calcium 8.2 (L) 8.5 - 10.5 mg/dL    Anion Gap 15.0 7.0 - 16.0   ESTIMATED GFR    Collection Time: 04/03/23 11:21 AM   Result Value Ref Range    GFR (CKD-EPI) 59 (A) >60 mL/min/1.73 m 2       Fluids    Intake/Output Summary (Last 24 hours) at 4/3/2023 1634  Last data filed at 4/3/2023 1400  Gross per 24 hour   Intake 1344.72 ml   Output 750 ml   Net 594.72 ml       Core Measures & Quality Metrics  Labs reviewed,  Medications reviewed and Radiology images reviewed  Deutsch catheter: No Deutsch      DVT Prophylaxis: Contraindicated - High bleeding risk  DVT prophylaxis - mechanical: SCDs  Ulcer prophylaxis: Yes      RAP Score Total: 7  CAGE Results: positive Blood Alcohol>0.08: yes       Assessment complete date: 4/3/2023  Intervention: Complete. Patient response to intervention: 3-4 cocktails a night..   Patient demonstrates understanding of intervention. Patient does not agree to follow-up.   has not been contacted. Follow up with: PCP, Clinic and Self Help Group  Total ETOH intervention time: 15 - 30 mintues    Assessment/Plan  * Trauma- (present on admission)  Assessment & Plan  GLF. Fell backwards striking her head.  TBI Alert.  Josh Hu DO. Trauma Surgery.    Subdural hematoma, post-traumatic (HCC)- (present on admission)  Assessment & Plan  11.7 mm acute on subacute appearing lateral hemispheric subdural hematoma with 1.5 mm left right midline shift.  Repeat head CT in 6 hrs.  4/3/2023 left stevie hole evacuation of hematoma and subdural drain placement  Post traumatic pharmacologic seizure prophylaxis for 1 week.  Speech Language Pathology cognitive evaluation.  Shmuel Rodriguez MD, PhD. Neurosurgeon. [facility].     Hyponatremia- (present on admission)  Assessment & Plan  Likely chronic. Admission serum Na 127 mmol/L  NaCl infusion. Hydrochlorothiazide stopped.  Trend labs.    Alcohol abuse- (present on admission)  Assessment & Plan  Admission blood alcohol level of 250.  Trauma alcohol withdrawal protocol initiated.  Alcohol withdrawal surveillance.  4/3 Brief intervention completed.    Aspirin long-term use- (present on admission)  Assessment & Plan  Thromboelastogram with 29.1% AA inhibition and 15.7 ADP inhibition.    Contraindication to deep vein thrombosis (DVT) prophylaxis- (present on admission)  Assessment & Plan  Prophylactic anticoagulation for thrombotic prevention  initially contraindicated secondary to elevated bleeding risk.  4/3 Trauma surveillance venous duplex scanning ordered.    Hypokalemia- (present on admission)  Assessment & Plan  Chronic condition treated with potassium chloride SA.  Resumed maintenance medication on admission.   Trend labs.    Dyslipidemia- (present on admission)  Assessment & Plan  Chronic condition treated with simvastatin.  Resumed maintenance medication on admission.    GERD (gastroesophageal reflux disease)- (present on admission)  Assessment & Plan  Chronic condition treated with pantoprazole.  Resumed maintenance medication on admission.     Essential hypertension- (present on admission)  Assessment & Plan  Chronic condition treated with metoprolol, hydrochlorothiazide, lisinopril.  Resumed maintenance medications on admission with the exception of hydrochlorothiazide secondary to hyponatremia..     Overall plan:  Diet  Therapies  SBIRT  ICU while subdural drain in place.    Discussed patient condition with RN, RT, and Pharmacy.    Emiliano Smith MD  [phone]

## 2023-04-03 NOTE — ANESTHESIA TIME REPORT
Anesthesia Start and Stop Event Times     Date Time Event    4/3/2023 0900 Ready for Procedure     0913 Anesthesia Start     1012 Anesthesia Stop        Responsible Staff  04/03/23    Name Role Begin End    Georgi Gtz D.O. Anesth 0913 1012        Overtime Reason:  no overtime (within assigned shift)    Comments:

## 2023-04-03 NOTE — CARE PLAN
The patient is Watcher - Medium risk of patient condition declining or worsening    Shift Goals  Clinical Goals: stable neuro  Patient Goals: sleep  Family Goals: updates    Progress made toward(s) clinical / shift goals:  Update POC all questions answered. Medicated per MAR.     Problem: Knowledge Deficit - Standard  Goal: Patient and family/care givers will demonstrate understanding of plan of care, disease process/condition, diagnostic tests and medications  Outcome: Progressing     Problem: Skin Integrity  Goal: Skin integrity is maintained or improved  Outcome: Progressing     Problem: Fall Risk  Goal: Patient will remain free from falls  Outcome: Progressing

## 2023-04-03 NOTE — OP REPORT
"NEUROSURGERY OPERATIVE NOTE    4/3/2023 1000    Lily Lloyd [id number]    PROCEDURE:    1.  left bur hole drainage subdural hematoma  2.  Left frontal Subdural drain placement    DIAGNOSIS:  Left acute on Chronic Subdural Hematoma    Surgeon:  Shmuel Rodriguez MD, PhD  Assistant:  None    Anesthesia:  GETA    Indication:  87 year old female who fell last night; she presented with headache and slurred speech.  Head CT demonstrated a 14 mm left acute on chronic SDH with significant cortical mass effect     Procedure:  The patient was identified in the holding area.  The surgical site was marked and consent obtained.  The patient was brought to the operating room and intubated by Anesthesia service.  2 gram Ancef was administered intravenously.  The patient was positioned supine on the operating room table with their head supported on a [place] horseshoe headholder positioned near parallel to the floor.  The left scalp was prepped with chlorhexidine and betadine scrub, and Chloroprep.  Sterile drapes were placed including a layer of Ioban.  The planned incision was infiltrated with 0.5% Marcaine with epinephrine.  A linear incision was created in the frontal region superior to the superior temporal line.  Dissection was carried down to the skull using monopolar cautery.  Self retaining retractor was placed.  Bur hole was created using the high speed drill with the skull  bit.  The bone edges were waxed.  The dura was cauterized with bipolar cautery, and opened sharply in a cruciate manner. A superficial membrane was present; this was cauterized and opened sharply.   Brown-brick red liquid hematoma was expressed under pressure.  A deep membrane was opened in like manner.  The subdural space was copiously irrigated with warm NS until the effluent was clear; this included irrigation through a ventricular catheter placed in the subdural space.  A \"large\" 35 cm ventricular catheter was placed anteriorly in the " subdural space via the bur hole, brought out through a separate incision and secured to the skin using 2-0 Nylon suture.  The catheter was attached to a CSF drainage bag system.  Gelfoam was placed over the bur hole followed by a slotted miniplate (Leibinger, Philip).  The dermis was reapproximated with 3-0 Vicryl suture in an inverted interrupted manner.  The skin was reapproximated with staples.  Bacitracin ointment, Xerform gauze, and a sterile dressing were applied.  Final counts were correct.    EBL:  Minimal  Specimen:  None  Findings:  acute on Chronic hematoma under pressure  Drains:  Right frontal subdural  Complication:  None apparent at end of procedure

## 2023-04-03 NOTE — ANESTHESIA PROCEDURE NOTES
Airway    Date/Time: 4/3/2023 9:20 AM  Performed by: Georgi Gtz D.O.  Authorized by: Georgi Gtz D.O.     Location:  OR  Urgency:  Elective  Difficult Airway: Yes     Very anterior, neck mobility limited  Indications for Airway Management:  Anesthesia      Spontaneous Ventilation: absent    Sedation Level:  Deep  Preoxygenated: Yes    Patient Position:  Sniffing  Mask Difficulty Assessment:  2 - vent by mask + OA or adjuvant +/- NMBA  Final Airway Type:  Endotracheal airway  Final Endotracheal Airway:  ETT  Cuffed: Yes    Technique Used for Successful ETT Placement:  Direct laryngoscopy    Insertion Site:  Oral  Blade Type:  Muse  Laryngoscope Blade/Videolaryngoscope Blade Size:  3  ETT Size (mm):  7.0  Measured from:  Teeth  ETT to Teeth (cm):  21  Placement Verified by: auscultation and capnometry    Cormack-Lehane Classification:  Grade IIb - view of arytenoids or posterior of glottis only  Number of Attempts at Approach:  1

## 2023-04-03 NOTE — ASSESSMENT & PLAN NOTE
Chronic condition treated with metoprolol, hydrochlorothiazide, lisinopril.  Resumed maintenance medications on admission with the exception of hydrochlorothiazide secondary to hyponatremia.

## 2023-04-03 NOTE — ASSESSMENT & PLAN NOTE
Prophylactic anticoagulation for thrombotic prevention initially contraindicated secondary to elevated bleeding risk.  4/3 Trauma surveillance venous duplex scanning ordered.  4/4 Trauma screening bilateral lower extremity venous duplex negative for above knee DVT.

## 2023-04-03 NOTE — ED TRIAGE NOTES
Lily Shaikh Gabino  87 y.o.  female  Chief Complaint   Patient presents with    T-5000 Head Injury     Brought in by nick from [facility] c/o Head Injury s/p GLF. Per report witnessed GLF. Patient fell backwards and hit head on the floor. + LOC unknown amount of time. + ETOH takes Aspirin.

## 2023-04-03 NOTE — ANESTHESIA PREPROCEDURE EVALUATION
Case: [id number] Anesthesia Start Date/Time: 04/03/23 0913    Procedure: CREATION, CRANIAL MANUEL HOLE (Left)    Pre-op diagnosis: SUBDURAL HEMATOMA    Location: [facility] OR 05 / SURGERY [facility]    Surgeons: Shmuel Rodriguez M.D.          Relevant Problems   CARDIAC   (positive) Essential hypertension      GI   (positive) GERD (gastroesophageal reflux disease)         (positive) CKD (chronic kidney disease) stage 3, GFR 30-59 ml/min (Former[facility])       Physical Exam    Airway   Mallampati: III  TM distance: >3 FB  Neck ROM: full       Cardiovascular - normal exam  Rhythm: regular  Rate: normal  (-) murmur     Dental - normal exam           Pulmonary - normal exam  Breath sounds clear to auscultation     Abdominal    Neurological - normal exam                 Anesthesia Plan    ASA 4- EMERGENT   ASA physical status 4 criteria: CVA or TIA - recent (< 3 months)ASA physical status emergent criteria: neurologic compromise requiring immediate intervention    Plan - general       Airway plan will be ETT          Induction: intravenous    Postoperative Plan: Postoperative administration of opioids is intended.    Pertinent diagnostic labs and testing reviewed    Informed Consent:    Anesthetic plan and risks discussed with patient.    Use of blood products discussed with: patient whom consented to blood products.

## 2023-04-03 NOTE — ANESTHESIA POSTPROCEDURE EVALUATION
Patient: Lily Lloyd    Procedure Summary     Date: 04/03/23 Room / Location: [facility] 05 / SURGERY [facility]    Anesthesia Start: 0913 Anesthesia Stop: 1012    Procedure: CREATION, CRANIAL MANUEL HOLE (Left: Head) Diagnosis: (SUBDURAL HEMATOMA)    Surgeons: Shmuel Rodriguez M.D. Responsible Provider: Georgi Gtz D.O.    Anesthesia Type: general ASA Status: 4 - Emergent          Final Anesthesia Type: general  Last vitals  BP   Blood Pressure : 118/61    Temp   36.2 °C (97.2 °F)    Pulse   68   Resp   15    SpO2   98 %      Anesthesia Post Evaluation    Patient location during evaluation: PACU  Patient participation: complete - patient participated  Level of consciousness: awake and alert    Airway patency: patent  Anesthetic complications: no  Cardiovascular status: hemodynamically stable  Respiratory status: acceptable  Hydration status: euvolemic    PONV: none          No notable events documented.     Nurse Pain Score: 0 (NPRS)

## 2023-04-03 NOTE — OR NURSING
1003: Pt arrived from OR, handoff received from anesthesiologist and RN. Patient asleep with OPA in place, breathing even and unlabored. Pupils PERRLA, 2mm, brisk. Subdrual drain to left side of head, to keep open at shoulder level per surgeons order.     1030: Patient waking up, gag refelx intact-OPA removed. Oriented x4, moving all extremities, equal strength. PLced n 2L via nasal cannula.     1035: Telephone call made to family, no answer, message left.     1100: Recovery compete. No changes to neuro status. Drain level delivered to bedside by OR. Phlebotomist called for pending lab.     1110: Report given to Joan HEATON.     1115: Lab at bedside.     1130: Patient transported to S123 on monitor.

## 2023-04-03 NOTE — CARE PLAN
The patient is Stable - Low risk of patient condition declining or worsening    Shift Goals  Clinical Goals: Stable neuro exam, Q1 hour neuros, repeat CT this am  Patient Goals: cristine  Family Goals: cristine    Progress made toward(s) clinical / shift goals:    Problem: Skin Integrity  Goal: Skin integrity is maintained or improved  Outcome: Progressing     Problem: Fall Risk  Goal: Patient will remain free from falls  Outcome: Progressing

## 2023-04-03 NOTE — ASSESSMENT & PLAN NOTE
Admission blood alcohol level of 0.25.  Trauma alcohol withdrawal protocol initiated.  Alcohol withdrawal surveillance.  4/3 Brief intervention completed.  4/6 Instructed to refrain from alcohol.

## 2023-04-03 NOTE — THERAPY
Occupational Therapy Contact Note    OT consult received. Pt to OR today. Will attempt post op as appropriate.    Mery Small, OTR/L

## 2023-04-03 NOTE — THERAPY
Speech Language Therapy Contact Note    Patient Name: Lily Lloyd  Age:  87 y.o., Sex:  female  Medical Record #: [id number]  Today's Date: 4/3/2023       04/03/23 0902   Treatment Variance   Reason For Missed Therapy Medical - Patient  in Procedure   Total Treatment Time   SLP Time Spent Yes  (10)   Interdisciplinary Plan of Care Collaboration   IDT Collaboration with  Nursing;Physician Assistant   Collaboration Comments Orders received for cognitive-linguistic evaluation. Per RN and PA, pt is scheduled for stevie holes this date. SLP to hold cog eval at this time and return after surgery and once pt medically appropriate to complete evaluation.   Session Information   Date / Session Number note only 4/3   Priority   (needs cog after stevie holes)

## 2023-04-03 NOTE — THERAPY
"Speech Language Pathology   Cognitive Evaluation     Patient Name: Lily Lloyd  AGE:  87 y.o., SEX:  female  Medical Record #: [id number]  Date of Service: 4/3/2023      History of Present Illness  86 YO female admitted 4/3 2/2 GLF.     PMHx: arthritis, breath shortness, cataract, diabets, heart burn, hiatus hernia syndrome, HTN, indigestion, pain, renal disorder, snoring.     CMHx: trauma, subdural hematoma s/p stevie holes and drain placement, hyponatremia, alcohol abuse, aspirin long term use, hypokalemia, DLD, GERD, essential HTN.    Head CT 4/3 \"1.  Stable left frontoparietal acute on subacute to chronic left frontoparietal subdural hematoma measuring up to 14 mm in greatest thickness.  2.  Stable minimal left to right midline shift measuring 2 mm.  3.  No new acute intracranial hemorrhage.\"      General Information  Vitals  O2 (LPM): 2  O2 Delivery Device: Nasal Cannula  Level of Consciousness: Alert, Awake     Orientation: Oriented x 4  Follows Directives: Yes      Prior Living Situation & Level of Function  Housing / Facility: 2 Story House  Lives with - Patient's Self Care Capacity: Sibling, Other (Comments) (sister and aunt)  Communication: WFL  Motor Speech: WFL  Laryngeal Function Exam  Voice Quality: WFL    Subjective  Pt alert, followed directions and participated fully.    Communication Domain(s)  Expressive Language: WFL  Receptive Language: WFL  Cognitive-Linguistic: [facility]  Reading: [facility]  Social/Pragmatic: WFL      Assessment  The patient was seen this date for a cognitive-linguistic evaluation 2/2 subdural hematoma s/p stevie holes and drain placement. Pt reported living in 2 story house with her sister and her aunt, is retired, drives, independently manages finances and medications via pillbox. The Cognistat was administered in conjunction with non-standardized assessments.     Cognistat  Orientation: Average  Attention: Average  Comprehension: Average  Repetition: Average  Naming: Average  Memory: " Average  Calculations: Average  Similarities: Average  Judgement: Average    Pt recalled 1/4 words after 3 minute delay, recalled 2/3 with logical cue and remaining word with multiple choice cues. Pt completed medication management task with high accuracy independently. Pt demonstrated functional reading and writing. Clock drawing WNL.     Clinical Impressions  Pt presenting with functional cognitive-linguistic function. Memory, problem solving, attention, and reasoning WFL for pt's age.     NOTE: It is not within the scope of practice of Speech-Language Pathologists to determine patient capacity. Please defer to the physician or psych to complete this assessment.     Recommendations  Supervision Needs Upons Discharge: None  IADLs: N/A     SLP Treatment Plan  Treatment Plan: None Indicated  SLP Frequency: N/A - Evaluation Only  Estimated Duration: N/A - Evaluation Only      Anticipated Discharge Needs  Discharge Recommendations: Anticipate that the patient will have no further speech therapy needs after discharge from the hospital  Therapy Recommendations Upon DC: Not Indicated                Raymond Lopez, SLP

## 2023-04-03 NOTE — ASSESSMENT & PLAN NOTE
Likely chronic. Admission serum Na 127 mmol/L.  NaCl infusion. Hydrochlorothiazide stopped.  4/5 Normalized.

## 2023-04-03 NOTE — PROGRESS NOTES
Patient arrived to [facility] at 0350, transported by RN and CCT. Able to transfer self from [facility] to bed.     HR 86  /67  SpO2 94% on room air  RR 22  Temp 97.0  Weight 67.6kg    Belongings: Shoes, pants, purse, smart phone, consumer cellular flip phone, jacket, sweater, underwear, shirt, socks, car key, wallet x2 ($100 cash), earrings and necklace. Cigarettes in purse removed from room and secured.        4 Eyes Skin Assessment Completed by Annmarie RN and FLORINA Rawls.    Head Bruising and Redness under right eye  Ears WDL  Nose WDL  Mouth WDL  Neck WDL  Breast/Chest WDL  Shoulder Blades WDL  Spine Calloused dry raised areas  (R) Arm/Elbow/Hand Bruising  (L) Arm/Elbow/Hand Bruising  Abdomen Discoloration and raised areas of skin (see photo taken)  Groin WDL  Scrotum/Coccyx/Buttocks Discoloration Bruising sacrum/buttocks (see photo)  (R) Leg Bruising  (L) Leg Bruising  (R) Heel/Foot/Toe Edema  (L) Heel/Foot/Toe Edema          Devices In Places ECG, Blood Pressure Cuff, and Pulse Ox      Interventions In Place Sacral Mepilex, Pillows, Q2 Turns, and Low Air Loss Mattress    Possible Skin Injury Yes    Pictures Uploaded Into Epic Yes  Wound Consult Placed N/A  RN Wound Prevention Protocol Ordered Yes

## 2023-04-03 NOTE — H&P
CHIEF COMPLAINT: Head injury after ground-level fall.     HISTORY OF PRESENT ILLNESS: The patient is a 87 year-old White elderly woman [place] she is [place] backwards striking her head.  She has headache at the site.  Denies nausea, neck pain, vision or hearing changes or focal neurologic deficits.  Denies any other complaints    TRIAGE CATEGORY: The patient was triaged as a T-5000 Activation. An expeditious primary and secondary survey with required adjuncts was conducted. See Trauma Narrator for full details.    PAST MEDICAL HISTORY:  has a past medical history of Arthritis, Breath shortness, CATARACT, Diabetes, Heart burn, Hiatus hernia syndrome, Hypertension, Indigestion, Pain, Renal disorder, and Snoring.    She has no past medical history of Breast cancer (HCC).    PAST SURGICAL HISTORY:  has a past surgical history that includes nasal polypectomy (8/19/2010); ethmoidectomy (8/19/2010); antrostomy (8/19/2010); knee arthroplasty total; appendectomy; sinusotomies (12/20/2012); nasal polypectomy (12/20/2012); abdominal hysterectomy total; oophorectomy; cataract phaco with iol (8/6/2013); and cataract phaco with iol (8/20/2013).    ALLERGIES:   Allergies   Allergen Reactions    Pcn [Penicillins] Rash and Swelling    Sulfa Drugs Hives and Itching    Tape        CURRENT MEDICATIONS:   Home Medications       Reviewed by Georgi Michael R.N. (Registered Nurse) on 04/03/23 at 0211  Med List Status: Partial     Medication Last Dose Status   acetaminophen (Q-NOL) 325 MG Tab  Active   albuterol (VENTOLIN OR PROVENTIL) 108 (90 BASE) MCG/ACT AERS inhalation aerosol  Active   Cholecalciferol (VITAMIN D3) 2000 UNIT CAPS  Active   docosahexanoic acid (OMEGA 3 FA) 1000 MG CAPS  Active   iiywgjp-nhbimybsuo-ihw (ROBITUSSIN CF) 30- MG/5ML LIQD  Active   guaifenesin-codeine (TUSSI-ORGANIDIN NR) 100-10 MG/5ML syrup  Active   hydrochlorothiazide (HYDRODIURIL) 12.5 MG tablet  Active   lisinopril (PRINIVIL,  "ZESTRIL) 40 MG tablet  Active   metoprolol SR (TOPROL XL) 50 MG TABLET SR 24 HR  Active   Multiple Vitamins-Minerals (MULTIVITAMIN PO)  Active   pantoprazole (PROTONIX) 40 MG TBEC  Active   potassium chloride SA (K-DUR) 20 MEQ Tab CR  Active   simvastatin (ZOCOR) 40 MG Tab  Active   tizanidine (ZANAFLEX) 4 MG Tab  Active                  FAMILY HISTORY: family history includes Cancer in her paternal aunt, sister, and sister; Diabetes in her father.    SOCIAL HISTORY:  reports that she has been smoking cigarettes. She has a 25.00 pack-year smoking history. She uses smokeless tobacco. She reports current alcohol use of about 1.0 oz per week. She reports that she does not use drugs.    REVIEW OF SYSTEMS: Review of systems is remarkable for the following headache and pain at the site where she struck. The remainder of the comprehensive ROS is negative with the exception of the aforementioned HPI, PMH, and PSH bullets in accordance with CMS guideline.    PHYSICAL EXAMINATION:      Vital Signs: /56   Pulse 74   Temp 37.1 °C (98.7 °F) (Temporal)   Resp (!) 24   Ht 1.575 m (5' 2\")   Wt 67.6 kg (149 lb 0.5 oz)   SpO2 97%   Physical Exam  Vitals and nursing note reviewed.   HENT:      Head: Normocephalic and atraumatic.      Nose: Nose normal.      Mouth/Throat:      Mouth: Mucous membranes are moist.      Pharynx: Oropharynx is clear.   Eyes:      Extraocular Movements: Extraocular movements intact.      Conjunctiva/sclera: Conjunctivae normal.      Pupils: Pupils are equal, round, and reactive to light.   Cardiovascular:      Rate and Rhythm: Normal rate and regular rhythm.   Pulmonary:      Effort: Pulmonary effort is normal.   Abdominal:      General: There is no distension.      Palpations: Abdomen is soft.      Tenderness: There is no abdominal tenderness.   Musculoskeletal:         General: Normal range of motion.      Cervical back: Neck supple. No tenderness.   Skin:     General: Skin is warm and dry. "   Neurological:      General: No focal deficit present.      Mental Status: She is alert and oriented to person, place, and time.      Sensory: No sensory deficit.      Motor: No weakness.       LABORATORY VALUES:   Recent Labs     04/03/23 0253   WBC 5.5   RBC 4.58   HEMOGLOBIN 12.6   HEMATOCRIT 39.0   MCV 85.2   MCH 27.5   MCHC 32.3*   RDW 44.3   PLATELETCT 266   MPV 10.3     Recent Labs     04/03/23 0253   SODIUM 127*   POTASSIUM 3.4*   CHLORIDE 91*   CO2 18*   GLUCOSE 96   BUN 14   CREATININE 0.99   CALCIUM 8.2*     Recent Labs     04/03/23 0253   ASTSGOT 21   ALTSGPT 13   TBILIRUBIN 0.2   ALKPHOSPHAT 83   GLOBULIN 2.4   INR 0.96     Recent Labs     04/03/23 0253   APTT 29.7   INR 0.96        IMAGING:   CT-CSPINE WITHOUT PLUS RECONS   Final Result         1.  Multilevel degenerative changes of the cervical spine limit diagnostic sensitivity of this examination, otherwise no acute traumatic bony injury of the cervical spine is apparent.   2.  Atherosclerosis      CT-HEAD W/O   Final Result         1.  11.7 mm acute on subacute appearing lateral hemispheric subdural hematoma with 1.5 mm left right midline shift.   2.  Small calcified mass abutting the right frontal skull, appearance favors a small meningioma   3.  Bilateral maxillary sinusitis changes   4.  Atherosclerosis.      Based solely on CT findings, the brain injury guideline category is mBIG 3.      EDH   IVH   Displaced skull fx   SDH > 8mm   IPH > 8mm or multiple   SAH bi-hemispheric or > 3mm      The original BIG retrospective analysis found radiographic progression in 0% of BIG 1 patients and 2.6% BIG 2.         These findings were discussed with the patient's clinician, Omkar Marques Ii, on 4/3/2023 2:27 AM.      CT-HEAD W/O    (Results Pending)       ASSESSMENT AND PLAN:   87-year-old female with BIG 3 head injury who is neurologically intact.  Admitted to the trauma ICU.  Frequent neurochecks, [place], repeat CT in 6 hours.  Neurosurgery to  see in a.m.  Cognitive evaluation, PT and OT  Probably okay for diet in a.m.  Home med reconciliation completed    Subdural hematoma, post-traumatic (HCC)  11.7 mm acute on subacute appearing lateral hemispheric subdural hematoma with 1.5 mm left right midline shift.  Repeat head CT in 6 hrs.  Non-operative management.  Post traumatic pharmacologic seizure prophylaxis for 1 week.  Speech Language Pathology cognitive evaluation.  Shmuel Rodriguez MD, PhD. Neurosurgeon. [facility].     Trauma  GLF. Fell backwards striking her head.  TBI Alert.  Josh Hu DO. Trauma Surgery.    Dyslipidemia  Chronic condition treated with simvastatin.  Resumed maintenance medication on admission.    Essential hypertension  Chronic condition treated with metoprolol, hydrochlorothiazide, lisinopril.  Resumed maintenance medications on admission with the exception of hydrochlorothiazide secondary to hyponatremia..     Contraindication to deep vein thrombosis (DVT) prophylaxis  Prophylactic anticoagulation for thrombotic prevention initially contraindicated secondary to elevated bleeding risk.  4/3 Trauma surveillance venous duplex scanning ordered.    Aspirin long-term use  Thromboelastogram with 29.1% AA inhibition and 15.7 ADP inhibition.    Alcohol abuse  Admission blood alcohol level of 250.  Trauma alcohol withdrawal protocol initiated.  Alcohol withdrawal surveillance.  4/3 Brief intervention completed.    Hyponatremia  Likely chronic. Admission serum Na 127 mmol/L  NaCl infusion. Hydrochlorothiazide stopped.  Trend labs.    GERD (gastroesophageal reflux disease)  Chronic condition treated with pantoprazole.  Resumed maintenance medication on admission.     Hypokalemia  Chronic condition treated with potassium chloride SA.  Resumed maintenance medication on admission.   Trend labs.    DISPOSITION: Trauma ICU.  Trauma tertiary survey.         ____________________________________     Josh Hu D.O.    DD:  4/3/2023  6:50 AM

## 2023-04-03 NOTE — ASSESSMENT & PLAN NOTE
Admission imaging with 11.7 mm acute on subacute appearing lateral hemispheric subdural hematoma with 1.5 mm left right midline shift.  4/3 Left stevie hole evacuation of hematoma and subdural drain placement.  4/5 Drain removal.  Post traumatic pharmacologic seizure prophylaxis for 1 week.  Speech Language Pathology cognitive evaluation recommending supervision upon discharge.  Shmuel Rodriguez MD, PhD. Neurosurgeon. [facility].

## 2023-04-04 ENCOUNTER — APPOINTMENT (OUTPATIENT)
Dept: RADIOLOGY | Facility: MEDICAL CENTER | Age: 88
DRG: 026 | End: 2023-04-04
Attending: NURSE PRACTITIONER
Payer: MEDICARE

## 2023-04-04 ENCOUNTER — HOSPITAL ENCOUNTER (OUTPATIENT)
Dept: RADIOLOGY | Facility: MEDICAL CENTER | Age: 88
End: 2023-04-04
Attending: NEUROLOGICAL SURGERY

## 2023-04-04 PROBLEM — Z75.8 DISCHARGE PLANNING ISSUES: Status: ACTIVE | Noted: 2023-04-04

## 2023-04-04 PROBLEM — Z02.9 DISCHARGE PLANNING ISSUES: Status: ACTIVE | Noted: 2023-04-04

## 2023-04-04 LAB
ALBUMIN SERPL BCP-MCNC: 3.3 G/DL (ref 3.2–4.9)
ALBUMIN/GLOB SERPL: 1.5 G/DL
ALP SERPL-CCNC: 67 U/L (ref 30–99)
ALT SERPL-CCNC: 12 U/L (ref 2–50)
ANION GAP SERPL CALC-SCNC: 11 MMOL/L (ref 7–16)
AST SERPL-CCNC: 22 U/L (ref 12–45)
BASOPHILS # BLD AUTO: 0.1 % (ref 0–1.8)
BASOPHILS # BLD: 0.01 K/UL (ref 0–0.12)
BILIRUB SERPL-MCNC: 0.2 MG/DL (ref 0.1–1.5)
BUN SERPL-MCNC: 23 MG/DL (ref 8–22)
CALCIUM ALBUM COR SERPL-MCNC: 9 MG/DL (ref 8.5–10.5)
CALCIUM SERPL-MCNC: 8.4 MG/DL (ref 8.5–10.5)
CHLORIDE SERPL-SCNC: 102 MMOL/L (ref 96–112)
CO2 SERPL-SCNC: 20 MMOL/L (ref 20–33)
CREAT SERPL-MCNC: 1.14 MG/DL (ref 0.5–1.4)
EOSINOPHIL # BLD AUTO: 0 K/UL (ref 0–0.51)
EOSINOPHIL NFR BLD: 0 % (ref 0–6.9)
ERYTHROCYTE [DISTWIDTH] IN BLOOD BY AUTOMATED COUNT: 44.2 FL (ref 35.9–50)
GFR SERPLBLD CREATININE-BSD FMLA CKD-EPI: 47 ML/MIN/1.73 M 2
GLOBULIN SER CALC-MCNC: 2.2 G/DL (ref 1.9–3.5)
GLUCOSE SERPL-MCNC: 121 MG/DL (ref 65–99)
HCT VFR BLD AUTO: 33.5 % (ref 37–47)
HGB BLD-MCNC: 11 G/DL (ref 12–16)
IMM GRANULOCYTES # BLD AUTO: 0.02 K/UL (ref 0–0.11)
IMM GRANULOCYTES NFR BLD AUTO: 0.3 % (ref 0–0.9)
LYMPHOCYTES # BLD AUTO: 0.78 K/UL (ref 1–4.8)
LYMPHOCYTES NFR BLD: 10.8 % (ref 22–41)
MCH RBC QN AUTO: 27.8 PG (ref 27–33)
MCHC RBC AUTO-ENTMCNC: 32.8 G/DL (ref 33.6–35)
MCV RBC AUTO: 84.8 FL (ref 81.4–97.8)
MONOCYTES # BLD AUTO: 0.45 K/UL (ref 0–0.85)
MONOCYTES NFR BLD AUTO: 6.2 % (ref 0–13.4)
NEUTROPHILS # BLD AUTO: 5.97 K/UL (ref 2–7.15)
NEUTROPHILS NFR BLD: 82.6 % (ref 44–72)
NRBC # BLD AUTO: 0 K/UL
NRBC BLD-RTO: 0 /100 WBC
PLATELET # BLD AUTO: 246 K/UL (ref 164–446)
PMV BLD AUTO: 10.8 FL (ref 9–12.9)
POTASSIUM SERPL-SCNC: 4.7 MMOL/L (ref 3.6–5.5)
PROT SERPL-MCNC: 5.5 G/DL (ref 6–8.2)
RBC # BLD AUTO: 3.95 M/UL (ref 4.2–5.4)
SODIUM SERPL-SCNC: 133 MMOL/L (ref 135–145)
WBC # BLD AUTO: 7.2 K/UL (ref 4.8–10.8)

## 2023-04-04 PROCEDURE — 70450 CT HEAD/BRAIN W/O DYE: CPT

## 2023-04-04 PROCEDURE — 85025 COMPLETE CBC W/AUTO DIFF WBC: CPT

## 2023-04-04 PROCEDURE — 99233 SBSQ HOSP IP/OBS HIGH 50: CPT | Performed by: SURGERY

## 2023-04-04 PROCEDURE — 770022 HCHG ROOM/CARE - ICU (200)

## 2023-04-04 PROCEDURE — 93970 EXTREMITY STUDY: CPT | Mod: 26 | Performed by: INTERNAL MEDICINE

## 2023-04-04 PROCEDURE — 97535 SELF CARE MNGMENT TRAINING: CPT

## 2023-04-04 PROCEDURE — 93970 EXTREMITY STUDY: CPT

## 2023-04-04 PROCEDURE — 700102 HCHG RX REV CODE 250 W/ 637 OVERRIDE(OP): Performed by: NURSE PRACTITIONER

## 2023-04-04 PROCEDURE — 97166 OT EVAL MOD COMPLEX 45 MIN: CPT

## 2023-04-04 PROCEDURE — 97163 PT EVAL HIGH COMPLEX 45 MIN: CPT

## 2023-04-04 PROCEDURE — 80053 COMPREHEN METABOLIC PANEL: CPT

## 2023-04-04 PROCEDURE — A9270 NON-COVERED ITEM OR SERVICE: HCPCS | Performed by: NURSE PRACTITIONER

## 2023-04-04 RX ADMIN — ACETAMINOPHEN 650 MG: 325 TABLET, FILM COATED ORAL at 17:19

## 2023-04-04 RX ADMIN — LEVETIRACETAM 500 MG: 500 TABLET, FILM COATED ORAL at 06:31

## 2023-04-04 RX ADMIN — LEVETIRACETAM 500 MG: 500 TABLET, FILM COATED ORAL at 17:18

## 2023-04-04 RX ADMIN — SIMVASTATIN 20 MG: 20 TABLET, FILM COATED ORAL at 17:19

## 2023-04-04 RX ADMIN — ACETAMINOPHEN 650 MG: 325 TABLET, FILM COATED ORAL at 12:33

## 2023-04-04 RX ADMIN — POTASSIUM CHLORIDE 20 MEQ: 1500 TABLET, EXTENDED RELEASE ORAL at 06:31

## 2023-04-04 RX ADMIN — DOCUSATE SODIUM 100 MG: 100 CAPSULE, LIQUID FILLED ORAL at 17:19

## 2023-04-04 RX ADMIN — ACETAMINOPHEN 650 MG: 325 TABLET, FILM COATED ORAL at 06:31

## 2023-04-04 RX ADMIN — DOCUSATE SODIUM 100 MG: 100 CAPSULE, LIQUID FILLED ORAL at 06:34

## 2023-04-04 RX ADMIN — OMEPRAZOLE 20 MG: 20 CAPSULE, DELAYED RELEASE ORAL at 06:34

## 2023-04-04 ASSESSMENT — GAIT ASSESSMENTS
DEVIATION: BRADYKINETIC
GAIT LEVEL OF ASSIST: SUPERVISED
ASSISTIVE DEVICE: FRONT WHEEL WALKER
DISTANCE (FEET): 200

## 2023-04-04 ASSESSMENT — COGNITIVE AND FUNCTIONAL STATUS - GENERAL
CLIMB 3 TO 5 STEPS WITH RAILING: A LITTLE
MOBILITY SCORE: 20
MOVING FROM LYING ON BACK TO SITTING ON SIDE OF FLAT BED: A LITTLE
SUGGESTED CMS G CODE MODIFIER DAILY ACTIVITY: CJ
DAILY ACTIVITIY SCORE: 21
DRESSING REGULAR LOWER BODY CLOTHING: A LITTLE
HELP NEEDED FOR BATHING: A LITTLE
STANDING UP FROM CHAIR USING ARMS: A LITTLE
TOILETING: A LITTLE
WALKING IN HOSPITAL ROOM: A LITTLE
SUGGESTED CMS G CODE MODIFIER MOBILITY: CJ

## 2023-04-04 ASSESSMENT — PAIN DESCRIPTION - PAIN TYPE
TYPE: ACUTE PAIN
TYPE: ACUTE PAIN;SURGICAL PAIN
TYPE: SURGICAL PAIN
TYPE: ACUTE PAIN;SURGICAL PAIN
TYPE: ACUTE PAIN;SURGICAL PAIN
TYPE: SURGICAL PAIN;ACUTE PAIN
TYPE: ACUTE PAIN

## 2023-04-04 ASSESSMENT — ACTIVITIES OF DAILY LIVING (ADL): TOILETING: INDEPENDENT

## 2023-04-04 ASSESSMENT — FIBROSIS 4 INDEX: FIB4 SCORE: 2.06

## 2023-04-04 NOTE — CARE PLAN
The patient is Stable - Low risk of patient condition declining or worsening    Shift Goals  Clinical Goals: Stable neuro exam, monitor EVD output, repeat CT , rest and comfort  Patient Goals: Rest, Go home for birthday  Family Goals: cristine    Progress made toward(s) clinical / shift goals:    Problem: Skin Integrity  Goal: Skin integrity is maintained or improved  Outcome: Progressing     Problem: Fall Risk  Goal: Patient will remain free from falls  Outcome: Progressing     Problem: Pain - Standard  Goal: Alleviation of pain or a reduction in pain to the patient’s comfort goal  Outcome: Progressing

## 2023-04-04 NOTE — THERAPY
"Occupational Therapy   Initial Evaluation     Patient Name: Lily Lloyd  Age:  88 y.o., Sex:  female  Medical Record #: [id number]  Today's Date: 4/4/2023     Precautions  Precautions: Fall Risk  Comments: SDD; clamped for mobility    Assessment  Patient is 88 y.o. female with a diagnosis of L SDH now s/p stevie hole evac and SDD placement. Hx of etoh, which she reports is likely why she fell this time.  She is at a SBA level for basic self care, functional mobility, and functional transfers with FWW demo'd today. She denies any further deficits in self care at this time and reports her sister can help her PRN. Not very receptive to [facility] on home safety and DME recommendations.       Plan    Occupational Therapy Initial Treatment Plan   Duration: Discharge Needs Only    DC Equipment Recommendations: Tub / Shower Seat  Discharge Recommendations: Recommend home health for continued occupational therapy services (w/ assistance for IADLs)     Subjective    \"I don't want my friends to see me with a walker.\"     Objective       04/04/23 0853   Prior Living Situation   Prior Services Home-Independent   Housing / Facility 2 Story House  (has a chair lift that she uses at baseline)   Bathroom Set up Bathtub / Shower Combination;Shower Glass Doors;Grab Bars   Equipment Owned Grab Bar(s) In Tub / Shower;Front-Wheel Walker   Lives with - Patient's Self Care Capacity Related Adult   Comments Pt lives with her sister and her aunt. They are all independent at home.   Prior Level of ADL Function   Self Feeding Independent   Grooming / Hygiene Independent   Bathing Independent   Dressing Independent   Toileting Independent   Prior Level of IADL Function   Medication Management Independent   Laundry Independent   Kitchen Mobility Independent   Finances Independent   Home Management Independent   Shopping Independent   Prior Level Of Mobility Independent Without Device in Community;Independent Without Device in Home   Driving / " Transportation Driving Independent   Occupation (Pre-Hospital Vocational) Retired Due To Age   History of Falls   History of Falls Yes   Precautions   Precautions Fall Risk   Comments SDD; clamped for mobility   Pain 0 - 10 Group   Therapist Pain Assessment Nurse Notified;During Activity  (min c/o drain incision pain)   Cognition    Cognition / Consciousness X   Level of Consciousness Alert   Comments pleasant and cooperative, impaired insight, not very receptive to suggestions from therapist re: DME and home safety   Active ROM Upper Body   Active ROM Upper Body  WDL   Dominant Hand Right   Strength Upper Body   Upper Body Strength  WDL   Coordination Upper Body   Coordination WDL   Balance Assessment   Sitting Balance (Static) Fair +   Sitting Balance (Dynamic) Fair   Standing Balance (Static) Fair   Standing Balance (Dynamic) Fair   Weight Shift Sitting Fair   Weight Shift Standing Fair   Comments w/ FWW   Bed Mobility    Supine to Sit Supervised   Scooting Supervised   ADL Assessment   Grooming Supervision;Seated  (oral care)   Lower Body Dressing Minimal Assist  (don/doff socks, don underwear, don shoes; assistance for tying shoes. she reports that her sister can help her tie her shoes)   Toileting   (declined need)   How much help from another person does the patient currently need...   Putting on and taking off regular lower body clothing? 3   Bathing (including washing, rinsing, and drying)? 3   Toileting, which includes using a toilet, bedpan, or urinal? 3   Putting on and taking off regular upper body clothing? 4   Taking care of personal grooming such as brushing teeth? 4   Eating meals? 4   6 Clicks Daily Activity Score 21   mRS Prior to admission   Prior to admission mRS 0   Modified Merrick (mRS)   Modified Kenzie Score 0   Functional Mobility   Sit to Stand Standby Assist   Bed, Chair, Wheelchair Transfer Standby Assist   Toilet Transfers Standby Assist  (BSC)   Mobility EOB>BSC>amb to elkins>chair    Comments w/ FWW   Visual Perception   Visual Perception  WDL

## 2023-04-04 NOTE — PROGRESS NOTES
Around 11:00 pt stated blurred vision in close fields of left eye-- Neuro surgery bedside. EVD drainage from around site but none in drain. Neuro surgery aspirated fluid and air from EVD and tightened sutures. Around 1 hour s/p pt denies blurred vision.

## 2023-04-04 NOTE — PROGRESS NOTES
Neurosurgery Progress Note    Subjective:  No events overnight   Left eye blurry vision     Exam:  AAO, fluent speech   PERRL, EOMI   -drift   ARITA, sensation intact   Left SDD 0/overnight   Incision CDI, small drainage through incision     BP  Min: 91/46  Max: 150/64  Pulse  Av.5  Min: 54  Max: 80  Resp  Av.3  Min: 12  Max: 40  Temp  Av.6 °C (97.9 °F)  Min: 36.3 °C (97.3 °F)  Max: 36.9 °C (98.4 °F)  SpO2  Av.7 %  Min: 92 %  Max: 98 %    No data recorded    Recent Labs     23  0253 23  0400   WBC 5.5 7.2   RBC 4.58 3.95*   HEMOGLOBIN 12.6 11.0*   HEMATOCRIT 39.0 33.5*   MCV 85.2 84.8   MCH 27.5 27.8   MCHC 32.3* 32.8*   RDW 44.3 44.2   PLATELETCT 266 246   MPV 10.3 10.8     Recent Labs     23  0253 23  1121 23  0400   SODIUM 127* 131* 133*   POTASSIUM 3.4* 4.0 4.7   CHLORIDE 91* 98 102   CO2 18* 18* 20   GLUCOSE 96 86 121*   BUN 14 11 23*   CREATININE 0.99 0.94 1.14   CALCIUM 8.2* 8.2* 8.4*     Recent Labs     23  0253   APTT 29.7   INR 0.96     Recent Labs     23  0415   REACTMIN 4.7   CLOTKINET 1.3   CLOTANGL 73.3   MAXCLOTS 60.2   PRCINADP 15.7   PRCINAA 29.1*       Intake/Output                         23 - 23 0659 23 - 2359      Total  Total                 Intake    P.O.  720  350 1070  250  -- 250    P.O.  250 -- 250    I.V.  410.9  -- 410.9  --  -- --    Volume (mL) (NS infusion) 10.9 -- 10.9 -- -- --    Volume (mL) (Lactated Ringers) 400 -- 400 -- -- --    IV Piggyback  453.9  -- 453.9  --  -- --    Volume (mL) (detox IV 1000 mL (D5LR + magnesium 1 g + thiamine 100 mg + folic acid 1 mg) infusion) 453.9 -- 453.9 -- -- --    Total Intake 1584.7 350 1934.7 250 -- 250       Output    Urine  850  700 1550  200  -- 200    Number of Times Voided 2 x 2 x 4 x 1 x -- 1 x    Urine Void (mL)  200 -- 200    Drains  0  0 0  0  -- 0    Output (ml) (Subdural Drain  Group Left) 0 0 0 0 -- 0    Total Output  200 -- 200       Net I/O     734.7 -350 384.7 50 -- 50              Intake/Output Summary (Last 24 hours) at 4/4/2023 1212  Last data filed at 4/4/2023 1000  Gross per 24 hour   Intake 1080 ml   Output 1300 ml   Net -220 ml             Respiratory Therapy Consult   Continuous RT    Pharmacy Consult Request  1 Each PHARMACY TO DOSE    ondansetron  4 mg Q4HRS PRN    docusate sodium  100 mg BID    senna-docusate  1 Tablet Nightly    senna-docusate  1 Tablet Q24HRS PRN    polyethylene glycol/lytes  1 Packet BID    magnesium hydroxide  30 mL DAILY    bisacodyl  10 mg Q24HRS PRN    sodium phosphate  1 Each Once PRN    NS   Continuous    acetaminophen  650 mg Q6HRS    Followed by    [START ON 4/8/2023] acetaminophen  650 mg Q6HRS PRN    oxyCODONE immediate-release  2.5 mg Q3HRS PRN    Or    oxyCODONE immediate-release  5 mg Q3HRS PRN    Or    HYDROmorphone  0.25 mg Q3HRS PRN    levETIRAcetam  500 mg Q12HRS    Or    levETIRAcetam (Keppra) IV  500 mg Q12HRS    ondansetron  4 mg Q4HRS PRN    promethazine  12.5 mg Q6HRS PRN    lisinopril  40 mg DAILY    metoprolol SR  50 mg DAILY    omeprazole  20 mg DAILY    potassium chloride SA  20 mEq QDAY    simvastatin  20 mg Q EVENING       Assessment and Plan:  Hospital day # 2  POD #1 Left bur hole   Prophylactic anticoagulation: no         Start date/time: TBD     Plan:  Stable   CT head stable   Drain remains, likely dc tomorrow   Drain tubing expressed, air pulled, drainage on pilllow  Keep at Shoulder level   Q2 neuro

## 2023-04-04 NOTE — DISCHARGE PLANNING
Case Management Discharge Planning    Admission Date: 4/3/2023  GMLOS: 3.6  ALOS: 1    6-Clicks ADL Score: 21  6-Clicks Mobility Score: 20      Anticipated Discharge Dispo: Discharge Disposition:   D/T to home under HHA care in anticipation of covered skilled care (06)  Discharge Address: [address] [place] [place]  [id number]  Discharge Contact Phone Number: [phone]    DME Needed: No    Action(s) Taken:   RN CM met with patient at bedside.  She lives with her aunt Aubrie and sister, Rosalba in a 2 story house.  Pt uses a cane occasionally.  She drives and is independent with ADLs and IADLs.  OT/PT recommending home health.  Voalte msg to [facility].  Explained home health and patient agreeable.  Consent obtained to send referral to [facility].  Choice form faxed to [facility].    Medically Clear: No    Next Steps: Follow up on home health referral.    Barriers to Discharge: Medical clearance    Care Transition Team Assessment    Information Source  Orientation Level: Oriented X4  Information Given By: Patient  Who is responsible for making decisions for patient? : Patient    Readmission Evaluation  Is this a readmission?: No    Elopement Risk  Legal Hold: No  Ambulatory or Self Mobile in Wheelchair: No-Not an Elopement Risk  Elopement Risk: Not at Risk for Elopement    Interdisciplinary Discharge Planning  Lives with - Patient's Self Care Capacity: Related Adult  Patient or legal guardian wants to designate a caregiver: No  Support Systems: Family Member(s)  Housing / Facility: 2 Story House (has a chair lift that she uses at baseline)  Prior Services: Home-Independent  Durable Medical Equipment: Not Applicable    Discharge Preparedness  What is your plan after discharge?: Home with help, Home health care  What are your discharge supports?: Sibling, Other (comment)  Prior Functional Level: Ambulatory, Drives Self, Independent with Activities of Daily Living, Independent with Medication Management, Uses  Cane  Difficulity with ADLs: Walking, Dressing  Difficulity with IADLs: None    Functional Assesment  Prior Functional Level: Ambulatory, Drives Self, Independent with Activities of Daily Living, Independent with Medication Management, Uses Cane    Finances  Financial Barriers to Discharge: No  Prescription Coverage: Yes    Vision / Hearing Impairment  Vision Impairment : Yes  Right Eye Vision: Wears Glasses  Left Eye Vision: Wears Glasses  Hearing Impairment : No  Hearing Impairment: Left Ear, Hearing Device Not Available         Advance Directive  Advance Directive?: None    Domestic Abuse  Have you ever been the victim of abuse or violence?: No  Physical Abuse or Sexual Abuse: No  Verbal Abuse or Emotional Abuse: No  Possible Abuse/Neglect Reported to:: Not Applicable    Psychological Assessment  History of Substance Abuse: None    Discharge Risks or Barriers  Discharge risks or barriers?: No    Anticipated Discharge Information  Discharge Disposition: D/T to home under A care in anticipation of covered skilled care (06)  Discharge Address: [address] [place] [address] [place]  [address]  Discharge Contact Phone Number: [phone]

## 2023-04-04 NOTE — DISCHARGE PLANNING
Received Choice form at 0359  Agency/Facility Name: [facility]  Referral sent per Choice form at 3445

## 2023-04-04 NOTE — FACE TO FACE
Face to Face Supporting Documentation - Home Health    The encounter with this patient was in whole or in part the primary reason for home health admission.    Date of encounter:   Patient:                    MRN:                       YOB: 2022  Lily Lloyd  [id number]  1935     Home health to see patient for:  Skilled Nursing care for assessment, interventions & education, Home health aide, Physical Therapy evaluation and treatment, Occupational therapy evaluation and treatment, and Speech Language Pathology evaluation and treatment    Skilled need for:  Post operative subdural hematoma.    Skilled nursing interventions to include:  Comment: as above    Homebound status evidenced by:  Needs the assistance of another person in order to leave the home. Leaving home requires a considerable and taxing effort. There is a normal inability to leave the home.    Community Physician to provide follow up care: Orlin Chacon M.D.     Optional Interventions? No      I certify the face to face encounter for this home health care referral meets the CMS requirements and the encounter/clinical assessment with the patient was, in whole, or in part, for the medical condition(s) listed above, which is the primary reason for home health care. Based on my clinical findings: the service(s) are medically necessary, support the need for home health care, and the homebound criteria are met.  I certify that this patient has had a face to face encounter by myself.  JACE Burch. - NPI: [id number]

## 2023-04-04 NOTE — PROGRESS NOTES
Trauma / Surgical Daily Progress Note    Date of Service  4/4/2023    Chief Complaint  87 y.o. female admitted 4/3/2023 with subdural hematoma after a fall while intoxicated with alcohol    Interval Events  OR yesterday for stevie holes and subdural drain.  Minimal complaints this morning.  Seen by consulting services, recommendations noted and appreciated.    Review of Systems  Review of Systems     Vital Signs for last 24 hours  Temp:  [36.3 °C (97.3 °F)-36.9 °C (98.4 °F)] 36.6 °C (97.8 °F)  Pulse:  [54-80] 55  Resp:  [12-40] 18  BP: ()/(46-71) 137/58  SpO2:  [92 %-98 %] 96 %    Hemodynamic parameters for last 24 hours       Respiratory Data     Respiration: 18, Pulse Oximetry: 96 %        RUL Breath Sounds: Expiratory Wheezes, RML Breath Sounds: Expiratory Wheezes, RLL Breath Sounds: Expiratory Wheezes, ANTHONY Breath Sounds: Expiratory Wheezes, LLL Breath Sounds: Expiratory Wheezes    Physical Exam  Physical Exam  Vitals and nursing note reviewed.   Constitutional:       Appearance: Normal appearance.   HENT:      Head:      Comments: Dressings in place  Drain in place     Right Ear: External ear normal.      Left Ear: External ear normal.      Nose: Nose normal.      Mouth/Throat:      Mouth: Mucous membranes are moist.      Pharynx: Oropharynx is clear.   Eyes:      Conjunctiva/sclera: Conjunctivae normal.      Pupils: Pupils are equal, round, and reactive to light.   Cardiovascular:      Rate and Rhythm: Normal rate and regular rhythm.      Pulses: Normal pulses.      Heart sounds: Normal heart sounds.   Pulmonary:      Effort: Pulmonary effort is normal.      Breath sounds: Normal breath sounds.   Abdominal:      General: Abdomen is flat.      Palpations: Abdomen is soft.   Musculoskeletal:         General: No swelling. Normal range of motion.      Cervical back: Normal range of motion and neck supple.   Skin:     General: Skin is warm and dry.      Capillary Refill: Capillary refill takes 2 to 3 seconds.    Neurological:      Mental Status: She is alert and oriented to person, place, and time.   Psychiatric:         Mood and Affect: Mood normal.         Behavior: Behavior normal.       Laboratory  Recent Results (from the past 24 hour(s))   CBC with Differential: Tomorrow AM    Collection Time: 04/04/23  4:00 AM   Result Value Ref Range    WBC 7.2 4.8 - 10.8 K/uL    RBC 3.95 (L) 4.20 - 5.40 M/uL    Hemoglobin 11.0 (L) 12.0 - 16.0 g/dL    Hematocrit 33.5 (L) 37.0 - 47.0 %    MCV 84.8 81.4 - 97.8 fL    MCH 27.8 27.0 - 33.0 pg    MCHC 32.8 (L) 33.6 - 35.0 g/dL    RDW 44.2 35.9 - 50.0 fL    Platelet Count 246 164 - 446 K/uL    MPV 10.8 9.0 - 12.9 fL    Neutrophils-Polys 82.60 (H) 44.00 - 72.00 %    Lymphocytes 10.80 (L) 22.00 - 41.00 %    Monocytes 6.20 0.00 - 13.40 %    Eosinophils 0.00 0.00 - 6.90 %    Basophils 0.10 0.00 - 1.80 %    Immature Granulocytes 0.30 0.00 - 0.90 %    Nucleated RBC 0.00 /100 WBC    Neutrophils (Absolute) 5.97 2.00 - 7.15 K/uL    Lymphs (Absolute) 0.78 (L) 1.00 - 4.80 K/uL    Monos (Absolute) 0.45 0.00 - 0.85 K/uL    Eos (Absolute) 0.00 0.00 - 0.51 K/uL    Baso (Absolute) 0.01 0.00 - 0.12 K/uL    Immature Granulocytes (abs) 0.02 0.00 - 0.11 K/uL    NRBC (Absolute) 0.00 K/uL   Comp Metabolic Panel (CMP): Tomorrow AM    Collection Time: 04/04/23  4:00 AM   Result Value Ref Range    Sodium 133 (L) 135 - 145 mmol/L    Potassium 4.7 3.6 - 5.5 mmol/L    Chloride 102 96 - 112 mmol/L    Co2 20 20 - 33 mmol/L    Anion Gap 11.0 7.0 - 16.0    Glucose 121 (H) 65 - 99 mg/dL    Bun 23 (H) 8 - 22 mg/dL    Creatinine 1.14 0.50 - 1.40 mg/dL    Calcium 8.4 (L) 8.5 - 10.5 mg/dL    AST(SGOT) 22 12 - 45 U/L    ALT(SGPT) 12 2 - 50 U/L    Alkaline Phosphatase 67 30 - 99 U/L    Total Bilirubin 0.2 0.1 - 1.5 mg/dL    Albumin 3.3 3.2 - 4.9 g/dL    Total Protein 5.5 (L) 6.0 - 8.2 g/dL    Globulin 2.2 1.9 - 3.5 g/dL    A-G Ratio 1.5 g/dL   CORRECTED CALCIUM    Collection Time: 04/04/23  4:00 AM   Result Value Ref Range     Correct Calcium 9.0 8.5 - 10.5 mg/dL   ESTIMATED GFR    Collection Time: 04/04/23  4:00 AM   Result Value Ref Range    GFR (CKD-EPI) 47 (A) >60 mL/min/1.73 m 2       Fluids    Intake/Output Summary (Last 24 hours) at 4/4/2023 1247  Last data filed at 4/4/2023 1000  Gross per 24 hour   Intake 1080 ml   Output 1300 ml   Net -220 ml         Core Measures & Quality Metrics  Labs reviewed, Medications reviewed and Radiology images reviewed  Deutsch catheter: No Deutsch      DVT Prophylaxis: Contraindicated - High bleeding risk  DVT prophylaxis - mechanical: SCDs  Ulcer prophylaxis: Yes      RAP Score Total: 7  CAGE Results: positive Blood Alcohol>0.08: yes CAGE Score: 0  Total: POSITIVE  Assessment complete date: 4/3/2023  Intervention: Complete. Patient response to intervention: 3-4 cocktails a night..   Patient demonstrates understanding of intervention. Patient does not agree to follow-up.   has not been contacted. Follow up with: PCP, [facility]  Total ETOH intervention time: 15 - 30 mintues    Assessment/Plan  * Trauma- (present on admission)  Assessment & Plan  GLF. Fell backwards striking her head.  TBI Alert.  Josh Hu DO. Trauma Surgery.    Subdural hematoma, post-traumatic (HCC)- (present on admission)  Assessment & Plan  11.7 mm acute on subacute appearing lateral hemispheric subdural hematoma with 1.5 mm left right midline shift.  Repeat head CT in 6 hrs.  4/3/2023 left stevie hole evacuation of hematoma and subdural drain placement  Post traumatic pharmacologic seizure prophylaxis for 1 week.  Speech Language Pathology cognitive evaluation.  Shmuel Rodriguez MD, PhD. Neurosurgeon. [facility].     Hyponatremia- (present on admission)  Assessment & Plan  Likely chronic. Admission serum Na 127 mmol/L  NaCl infusion. Hydrochlorothiazide stopped.  Trend labs.    Alcohol abuse- (present on admission)  Assessment & Plan  Admission blood alcohol level of 250.  Trauma  alcohol withdrawal protocol initiated.  Alcohol withdrawal surveillance.  4/3 Brief intervention completed.    Aspirin long-term use- (present on admission)  Assessment & Plan  Thromboelastogram with 29.1% AA inhibition and 15.7 ADP inhibition.    Contraindication to deep vein thrombosis (DVT) prophylaxis- (present on admission)  Assessment & Plan  Prophylactic anticoagulation for thrombotic prevention initially contraindicated secondary to elevated bleeding risk.  4/3 Trauma surveillance venous duplex scanning ordered.    Hypokalemia- (present on admission)  Assessment & Plan  Chronic condition treated with potassium chloride SA.  Resumed maintenance medication on admission.   Trend labs.    Dyslipidemia- (present on admission)  Assessment & Plan  Chronic condition treated with simvastatin.  Resumed maintenance medication on admission.    GERD (gastroesophageal reflux disease)- (present on admission)  Assessment & Plan  Chronic condition treated with pantoprazole.  Resumed maintenance medication on admission.     Essential hypertension- (present on admission)  Assessment & Plan  Chronic condition treated with metoprolol, hydrochlorothiazide, lisinopril.  Resumed maintenance medications on admission with the exception of hydrochlorothiazide secondary to hyponatremia..     Overall plan:  Diet  Therapies  SBIRT  ICU while subdural drain in place.    Discussed patient condition with RN, RT, and Pharmacy.    Emiliano Smith MD  [phone]

## 2023-04-04 NOTE — ASSESSMENT & PLAN NOTE
Date of admission: 4/3/2023.  4/4 Home Health ordered.  4/6 Home health reordered.  Cleared for discharge: Yes - Date: 4/6.  Discharge delayed: No.  Discharge date: 4/6.

## 2023-04-04 NOTE — CARE PLAN
The patient is Stable - Low risk of patient condition declining or worsening    Shift Goals  Clinical Goals: q1h neuro exam, monitor EVD output  Patient Goals: talk to family via phone  Family Goals: cristine    Progress made toward(s) clinical / shift goals:  neuro exams have remained stable and unchanged. EVD with no output this shift-- Pending Neuro Surg rounds. Pt ambulated with PT/OT-- gait steady. Scheduled tylenol for headache.

## 2023-04-04 NOTE — THERAPY
"Physical Therapy   Initial Evaluation     Patient Name: Lily Lloyd  Age:  88 y.o., Sex:  female  Medical Record #: [id number]  Today's Date: 4/4/2023     Precautions  Precautions: Fall Risk  Comments: SDD; clamped for mobility    Assessment  Patient is 88 y.o. female with a diagnosis of L SDH now s/p stevie hole evac and SDD placement. Hx of etoh, which she reports is likely why she fell this time.  Patient seen for PT eval and presents with impaired balance, but is able to mobilize at a supervision level with the FWW.  She has assist as needed from her sister and aunt, but she is able to dress and ambulate w/FWW.  Recommend HHPT upon DC. No further acute care PT needs at this time.     Plan    Physical Therapy Initial Treatment Plan   Duration: Evaluation only    DC Equipment Recommendations: None  Discharge Recommendations: Recommend home health for continued physical therapy services       Subjective    \"Can I go home today\"     Objective       04/04/23 0830   Initial Contact Note    Initial Contact Note Order Received and Verified, Physical Therapy Evaluation in Progress with Full Report to Follow.   Precautions   Precautions Fall Risk   Comments SDD clamped for mobility   Pain 0 - 10 Group   Location Head   Therapist Pain Assessment During Activity;4;Nurse Notified   Prior Living Situation   Prior Services Home-Independent   Housing / Facility 2 Story House   Steps Into Home 0   Steps In Home   (patient has a chair lift)   Bathroom Set up Bathtub / Shower Combination;Shower Glass Doors   Equipment Owned Grab Bar(s) In Tub / Shower;Front-Wheel Walker   Lives with - Patient's Self Care Capacity Related Adult   Comments Patient lives with her 95 yo aunt and her 74 yo sister. They all drive and are independent but can help each other as needed   Prior Level of Functional Mobility   Bed Mobility Independent   Transfer Status Independent   Ambulation Independent   Ambulation Distance community   Assistive Devices " Used None   Stairs Required Assist   Comments uses the chair lift 2/2 B knee pain   History of Falls   History of Falls Yes   Date of Last Fall   (reason for admit)   Cognition    Cognition / Consciousness X   Level of Consciousness Alert   Comments Patient is pleasant and cooperative   Active ROM Upper Body   Active ROM Upper Body  WDL   Strength Upper Body   Upper Body Strength  WDL   Active ROM Lower Body    Active ROM Lower Body  WDL   Strength Lower Body   Lower Body Strength  WDL   Sensation Lower Body   Lower Extremity Sensation   WDL   Coordination Upper Body   Coordination WDL   Coordination Lower Body    Coordination Lower Body  WDL   Other Treatments   Other Treatments Provided Educated about home safety   Balance Assessment   Sitting Balance (Static) Fair +   Sitting Balance (Dynamic) Fair   Standing Balance (Static) Fair   Standing Balance (Dynamic) Fair   Weight Shift Sitting Fair   Weight Shift Standing Fair   Comments w/FWW   Bed Mobility    Supine to Sit Supervised   Scooting Supervised   Gait Analysis   Gait Level Of Assist Supervised   Assistive Device Front Wheel Walker   Distance (Feet) 200   Deviation Bradykinetic   Functional Mobility   Sit to Stand Standby Assist   Bed, Chair, Wheelchair Transfer Standby Assist   Toilet Transfers Standby Assist   ICU Target Mobility Level   ICU Mobility - Targeted Level Level 4   How much difficulty does the patient currently have...   Turning over in bed (including adjusting bedclothes, sheets and blankets)? 4   Sitting down on and standing up from a chair with arms (e.g., wheelchair, bedside commode, etc.) 3   Moving from lying on back to sitting on the side of the bed? 4   How much help from another person does the patient currently need...   Moving to and from a bed to a chair (including a wheelchair)? 3   Need to walk in a hospital room? 3   Climbing 3-5 steps with a railing? 3   6 clicks Mobility Score 20   Activity Tolerance   Sitting in Chair post  session   Sitting Edge of Bed 10 min   Standing 10 min   Edema / Skin Assessment   Comments SDD in place and clamped   Education Group   Education Provided Role of Physical Therapist;Gait Training;Use of Assistive Device   Role of Physical Therapist Patient Response Patient;Acceptance;Explanation;Demonstration;Verbal Demonstration;Action Demonstration   Gait Training Patient Response Patient;Acceptance;Explanation;Demonstration;Action Demonstration   Use of Assistive Device Patient Response Patient;Acceptance;Demonstration;Explanation;Verbal Demonstration;Action Demonstration   Physical Therapy Initial Treatment Plan    Duration Evaluation only   Anticipated Discharge Equipment and Recommendations   DC Equipment Recommendations None   Discharge Recommendations Recommend home health for continued physical therapy services     Karey Morrow, PT, DPT, GCS

## 2023-04-05 LAB
ALBUMIN SERPL BCP-MCNC: 3.5 G/DL (ref 3.2–4.9)
ALBUMIN/GLOB SERPL: 1.5 G/DL
ALP SERPL-CCNC: 73 U/L (ref 30–99)
ALT SERPL-CCNC: 9 U/L (ref 2–50)
ANION GAP SERPL CALC-SCNC: 9 MMOL/L (ref 7–16)
AST SERPL-CCNC: 17 U/L (ref 12–45)
BASOPHILS # BLD AUTO: 0.4 % (ref 0–1.8)
BASOPHILS # BLD: 0.03 K/UL (ref 0–0.12)
BILIRUB SERPL-MCNC: 0.3 MG/DL (ref 0.1–1.5)
BUN SERPL-MCNC: 19 MG/DL (ref 8–22)
CALCIUM ALBUM COR SERPL-MCNC: 9.3 MG/DL (ref 8.5–10.5)
CALCIUM SERPL-MCNC: 8.9 MG/DL (ref 8.5–10.5)
CHLORIDE SERPL-SCNC: 105 MMOL/L (ref 96–112)
CO2 SERPL-SCNC: 23 MMOL/L (ref 20–33)
CREAT SERPL-MCNC: 1.05 MG/DL (ref 0.5–1.4)
EOSINOPHIL # BLD AUTO: 0.25 K/UL (ref 0–0.51)
EOSINOPHIL NFR BLD: 3.7 % (ref 0–6.9)
ERYTHROCYTE [DISTWIDTH] IN BLOOD BY AUTOMATED COUNT: 46.5 FL (ref 35.9–50)
GFR SERPLBLD CREATININE-BSD FMLA CKD-EPI: 51 ML/MIN/1.73 M 2
GLOBULIN SER CALC-MCNC: 2.3 G/DL (ref 1.9–3.5)
GLUCOSE SERPL-MCNC: 94 MG/DL (ref 65–99)
HCT VFR BLD AUTO: 36.9 % (ref 37–47)
HGB BLD-MCNC: 12 G/DL (ref 12–16)
IMM GRANULOCYTES # BLD AUTO: 0.05 K/UL (ref 0–0.11)
IMM GRANULOCYTES NFR BLD AUTO: 0.7 % (ref 0–0.9)
LYMPHOCYTES # BLD AUTO: 1.86 K/UL (ref 1–4.8)
LYMPHOCYTES NFR BLD: 27.2 % (ref 22–41)
MCH RBC QN AUTO: 28.2 PG (ref 27–33)
MCHC RBC AUTO-ENTMCNC: 32.5 G/DL (ref 33.6–35)
MCV RBC AUTO: 86.6 FL (ref 81.4–97.8)
MONOCYTES # BLD AUTO: 0.49 K/UL (ref 0–0.85)
MONOCYTES NFR BLD AUTO: 7.2 % (ref 0–13.4)
NEUTROPHILS # BLD AUTO: 4.15 K/UL (ref 2–7.15)
NEUTROPHILS NFR BLD: 60.8 % (ref 44–72)
NRBC # BLD AUTO: 0.02 K/UL
NRBC BLD-RTO: 0.3 /100 WBC
PLATELET # BLD AUTO: 255 K/UL (ref 164–446)
PMV BLD AUTO: 10.6 FL (ref 9–12.9)
POTASSIUM SERPL-SCNC: 4.8 MMOL/L (ref 3.6–5.5)
PROT SERPL-MCNC: 5.8 G/DL (ref 6–8.2)
RBC # BLD AUTO: 4.26 M/UL (ref 4.2–5.4)
SODIUM SERPL-SCNC: 137 MMOL/L (ref 135–145)
WBC # BLD AUTO: 6.8 K/UL (ref 4.8–10.8)

## 2023-04-05 PROCEDURE — A9270 NON-COVERED ITEM OR SERVICE: HCPCS | Performed by: NURSE PRACTITIONER

## 2023-04-05 PROCEDURE — 700102 HCHG RX REV CODE 250 W/ 637 OVERRIDE(OP): Performed by: NURSE PRACTITIONER

## 2023-04-05 PROCEDURE — 80053 COMPREHEN METABOLIC PANEL: CPT

## 2023-04-05 PROCEDURE — 85025 COMPLETE CBC W/AUTO DIFF WBC: CPT

## 2023-04-05 PROCEDURE — 99233 SBSQ HOSP IP/OBS HIGH 50: CPT | Performed by: SURGERY

## 2023-04-05 PROCEDURE — 770022 HCHG ROOM/CARE - ICU (200)

## 2023-04-05 RX ADMIN — POTASSIUM CHLORIDE 20 MEQ: 1500 TABLET, EXTENDED RELEASE ORAL at 06:12

## 2023-04-05 RX ADMIN — LISINOPRIL 40 MG: 20 TABLET ORAL at 06:12

## 2023-04-05 RX ADMIN — ACETAMINOPHEN 650 MG: 325 TABLET, FILM COATED ORAL at 06:11

## 2023-04-05 RX ADMIN — LEVETIRACETAM 500 MG: 500 TABLET, FILM COATED ORAL at 06:12

## 2023-04-05 RX ADMIN — LEVETIRACETAM 500 MG: 500 TABLET, FILM COATED ORAL at 17:11

## 2023-04-05 RX ADMIN — OMEPRAZOLE 20 MG: 20 CAPSULE, DELAYED RELEASE ORAL at 06:11

## 2023-04-05 RX ADMIN — ACETAMINOPHEN 650 MG: 325 TABLET, FILM COATED ORAL at 11:19

## 2023-04-05 RX ADMIN — ACETAMINOPHEN 650 MG: 325 TABLET, FILM COATED ORAL at 00:24

## 2023-04-05 RX ADMIN — SIMVASTATIN 20 MG: 20 TABLET, FILM COATED ORAL at 17:11

## 2023-04-05 RX ADMIN — ACETAMINOPHEN 650 MG: 325 TABLET, FILM COATED ORAL at 17:11

## 2023-04-05 RX ADMIN — ACETAMINOPHEN 650 MG: 325 TABLET, FILM COATED ORAL at 23:07

## 2023-04-05 ASSESSMENT — PAIN DESCRIPTION - PAIN TYPE
TYPE: ACUTE PAIN
TYPE: ACUTE PAIN;SURGICAL PAIN
TYPE: ACUTE PAIN;SURGICAL PAIN

## 2023-04-05 ASSESSMENT — FIBROSIS 4 INDEX: FIB4 SCORE: 1.933333333333333333

## 2023-04-05 ASSESSMENT — PATIENT HEALTH QUESTIONNAIRE - PHQ9
1. LITTLE INTEREST OR PLEASURE IN DOING THINGS: NOT AT ALL
SUM OF ALL RESPONSES TO PHQ9 QUESTIONS 1 AND 2: 0
2. FEELING DOWN, DEPRESSED, IRRITABLE, OR HOPELESS: NOT AT ALL

## 2023-04-05 NOTE — RESPIRATORY CARE
COPD EDUCATION by COPD CLINICAL EDUCATOR  4/5/2023 at 9:44 AM by Bertha Guevara, RRT     Patient interviewed by COPD education team. Patient denies COPD. She occasionally smokes She utilizes an Albuterol Inhaler rarely.

## 2023-04-05 NOTE — CARE PLAN
The patient is Stable - Low risk of patient condition declining or worsening    Shift Goals  Clinical Goals: q1h neuro exam, monitor EVD output  Patient Goals: talk to family  Family Goals: cristine    Progress made toward(s) clinical / shift goals:  stable neuro; pain control    Patient is not progressing towards the following goals:      Problem: Skin Integrity  Goal: Skin integrity is maintained or improved  Note: Problem: Skin Integrity  Goal: Skin Integrity is maintained or improved  Intervention: Vinh Skin Risk Assessment  Vinh assessed q shift-17, repositioned q 2hrs  Intervention: TURN EVERY 2 HOURS WHILE ON BEDREST  Pillows for positioning, mattress pressure weight specific, SCDs in place, Heel Floated on pillow     Problem: Pain - Standard  Goal: Alleviation of pain or a reduction in pain to the patient’s comfort goal  Outcome: Progressing  Note: Patient c/o minimal head ache-scheduled tylenol controls pain

## 2023-04-05 NOTE — CARE PLAN
The patient is Stable - Low risk of patient condition declining or worsening    Shift Goals  Clinical Goals: Q2h Neuro; monitor EVD  Patient Goals: DC; comfort  Family Goals: cristine    Problem: Knowledge Deficit - Standard  Goal: Patient and family/care givers will demonstrate understanding of plan of care, disease process/condition, diagnostic tests and medications  Outcome: Progressing     Problem: Skin Integrity  Goal: Skin integrity is maintained or improved  Outcome: Progressing     Problem: Pain - Standard  Goal: Alleviation of pain or a reduction in pain to the patient’s comfort goal  Outcome: Progressing     Problem: Mobility  Goal: Patient's capacity to carry out activities will improve  Outcome: Progressing     Problem: Neuro Status  Goal: Neuro status will remain stable or improve  Outcome: Progressing     Problem: Self Care  Goal: Patient will have the ability to perform ADLs independently or with assistance (bathe, groom, dress, toilet and feed)  Outcome: Progressing       Progress made toward(s) clinical / shift goals: patient actively engages in the discussion of diagnosis and treatment plan; skin integrity measures in place; pain is adequately managed with scheduled regimen; patient is independent with ADLs; neuro status remains stable (baseline)    Patient is not progressing towards the following goals:

## 2023-04-05 NOTE — PROGRESS NOTES
Trauma / Surgical Daily Progress Note    Date of Service  4/5/2023    Chief Complaint  87 y.o. female admitted 4/3/2023 with subdural hematoma after a fall while intoxicated with alcohol    Interval Events  Birthday yesterday  Minimal complaints this morning.  Seen by consulting services, recommendations noted and appreciated.    Review of Systems  Review of Systems     Vital Signs for last 24 hours  Temp:  [36.3 °C (97.3 °F)-36.8 °C (98.2 °F)] 36.3 °C (97.4 °F)  Pulse:  [49-66] 65  Resp:  [12-56] 21  BP: ()/(44-77) 125/59  SpO2:  [91 %-100 %] 97 %    Hemodynamic parameters for last 24 hours       Respiratory Data     Respiration: (!) 21, Pulse Oximetry: 97 %        RUL Breath Sounds: Diminished, RML Breath Sounds: Diminished, RLL Breath Sounds: Diminished, ANTHONY Breath Sounds: Diminished, LLL Breath Sounds: Diminished    Physical Exam  Physical Exam  Vitals and nursing note reviewed.   Constitutional:       Appearance: Normal appearance.   HENT:      Head:      Comments: Dressings in place  Drain in place     Right Ear: External ear normal.      Left Ear: External ear normal.      Nose: Nose normal.      Mouth/Throat:      Mouth: Mucous membranes are moist.      Pharynx: Oropharynx is clear.   Eyes:      Conjunctiva/sclera: Conjunctivae normal.      Pupils: Pupils are equal, round, and reactive to light.   Cardiovascular:      Rate and Rhythm: Normal rate and regular rhythm.      Pulses: Normal pulses.      Heart sounds: Normal heart sounds.   Pulmonary:      Effort: Pulmonary effort is normal.      Breath sounds: Normal breath sounds.   Abdominal:      General: Abdomen is flat.      Palpations: Abdomen is soft.   Musculoskeletal:         General: No swelling. Normal range of motion.      Cervical back: Normal range of motion and neck supple.   Skin:     General: Skin is warm and dry.      Capillary Refill: Capillary refill takes 2 to 3 seconds.   Neurological:      Mental Status: She is alert and oriented to  person, place, and time.   Psychiatric:         Mood and Affect: Mood normal.         Behavior: Behavior normal.       Laboratory  Recent Results (from the past 24 hour(s))   CBC with Differential: Tomorrow AM    Collection Time: 04/05/23  6:19 AM   Result Value Ref Range    WBC 6.8 4.8 - 10.8 K/uL    RBC 4.26 4.20 - 5.40 M/uL    Hemoglobin 12.0 12.0 - 16.0 g/dL    Hematocrit 36.9 (L) 37.0 - 47.0 %    MCV 86.6 81.4 - 97.8 fL    MCH 28.2 27.0 - 33.0 pg    MCHC 32.5 (L) 33.6 - 35.0 g/dL    RDW 46.5 35.9 - 50.0 fL    Platelet Count 255 164 - 446 K/uL    MPV 10.6 9.0 - 12.9 fL    Neutrophils-Polys 60.80 44.00 - 72.00 %    Lymphocytes 27.20 22.00 - 41.00 %    Monocytes 7.20 0.00 - 13.40 %    Eosinophils 3.70 0.00 - 6.90 %    Basophils 0.40 0.00 - 1.80 %    Immature Granulocytes 0.70 0.00 - 0.90 %    Nucleated RBC 0.30 /100 WBC    Neutrophils (Absolute) 4.15 2.00 - 7.15 K/uL    Lymphs (Absolute) 1.86 1.00 - 4.80 K/uL    Monos (Absolute) 0.49 0.00 - 0.85 K/uL    Eos (Absolute) 0.25 0.00 - 0.51 K/uL    Baso (Absolute) 0.03 0.00 - 0.12 K/uL    Immature Granulocytes (abs) 0.05 0.00 - 0.11 K/uL    NRBC (Absolute) 0.02 K/uL   Comp Metabolic Panel (CMP): Tomorrow AM    Collection Time: 04/05/23  6:19 AM   Result Value Ref Range    Sodium 137 135 - 145 mmol/L    Potassium 4.8 3.6 - 5.5 mmol/L    Chloride 105 96 - 112 mmol/L    Co2 23 20 - 33 mmol/L    Anion Gap 9.0 7.0 - 16.0    Glucose 94 65 - 99 mg/dL    Bun 19 8 - 22 mg/dL    Creatinine 1.05 0.50 - 1.40 mg/dL    Calcium 8.9 8.5 - 10.5 mg/dL    AST(SGOT) 17 12 - 45 U/L    ALT(SGPT) 9 2 - 50 U/L    Alkaline Phosphatase 73 30 - 99 U/L    Total Bilirubin 0.3 0.1 - 1.5 mg/dL    Albumin 3.5 3.2 - 4.9 g/dL    Total Protein 5.8 (L) 6.0 - 8.2 g/dL    Globulin 2.3 1.9 - 3.5 g/dL    A-G Ratio 1.5 g/dL   CORRECTED CALCIUM    Collection Time: 04/05/23  6:19 AM   Result Value Ref Range    Correct Calcium 9.3 8.5 - 10.5 mg/dL   ESTIMATED GFR    Collection Time: 04/05/23  6:19 AM   Result  Value Ref Range    GFR (CKD-EPI) 51 (A) >60 mL/min/1.73 m 2       Fluids    Intake/Output Summary (Last 24 hours) at 4/5/2023 1131  Last data filed at 4/5/2023 0800  Gross per 24 hour   Intake 622 ml   Output 7 ml   Net 615 ml         Core Measures & Quality Metrics  Labs reviewed, Medications reviewed and Radiology images reviewed  Deutsch catheter: No Deutsch      DVT Prophylaxis: Contraindicated - High bleeding risk  DVT prophylaxis - mechanical: SCDs  Ulcer prophylaxis: Yes      RAP Score Total: 7  CAGE Results: positive Blood Alcohol>0.08: yes CAGE Score: 0  Total: POSITIVE  Assessment complete date: 4/3/2023  Intervention: Complete. Patient response to intervention: 3-4 cocktails a night..   Patient demonstrates understanding of intervention. Patient does not agree to follow-up.   has not been contacted. Follow up with: PCP, [facility]  Total ETOH intervention time: 15 - 30 mintues    Assessment/Plan  * Trauma- (present on admission)  Assessment & Plan  GLF. Fell backwards striking her head.  TBI Alert.  Josh Hu DO. Trauma Surgery.    Subdural hematoma, post-traumatic (HCC)- (present on admission)  Assessment & Plan  11.7 mm acute on subacute appearing lateral hemispheric subdural hematoma with 1.5 mm left right midline shift.  Repeat head CT in 6 hrs.  4/3/2023 left stevie hole evacuation of hematoma and subdural drain placement  Post traumatic pharmacologic seizure prophylaxis for 1 week.  Speech Language Pathology cognitive evaluation.  Shmuel Rodriguez MD, PhD. Neurosurgeon. [facility].     Discharge planning issues- (present on admission)  Assessment & Plan  Date of admission: 4/3/2023.  Transfer orders from SICU to be determined   4/4 Home Health order.  Cleared for discharge: No.  Discharge delayed: No.  Discharge date: tbd.    Hyponatremia- (present on admission)  Assessment & Plan  Likely chronic. Admission serum Na 127 mmol/L  NaCl infusion.  Hydrochlorothiazide stopped.  Trend labs.    Alcohol abuse- (present on admission)  Assessment & Plan  Admission blood alcohol level of 250.  Trauma alcohol withdrawal protocol initiated.  Alcohol withdrawal surveillance.  4/3 Brief intervention completed.    Aspirin long-term use- (present on admission)  Assessment & Plan  Thromboelastogram with 29.1% AA inhibition and 15.7 ADP inhibition.    Contraindication to deep vein thrombosis (DVT) prophylaxis- (present on admission)  Assessment & Plan  Prophylactic anticoagulation for thrombotic prevention initially contraindicated secondary to elevated bleeding risk.  4/3 Trauma surveillance venous duplex scanning ordered.    Hypokalemia- (present on admission)  Assessment & Plan  Chronic condition treated with potassium chloride SA.  Resumed maintenance medication on admission.   Trend labs.    Dyslipidemia- (present on admission)  Assessment & Plan  Chronic condition treated with simvastatin.  Resumed maintenance medication on admission.    GERD (gastroesophageal reflux disease)- (present on admission)  Assessment & Plan  Chronic condition treated with pantoprazole.  Resumed maintenance medication on admission.     Essential hypertension- (present on admission)  Assessment & Plan  Chronic condition treated with metoprolol, hydrochlorothiazide, lisinopril.  Resumed maintenance medications on admission with the exception of hydrochlorothiazide secondary to hyponatremia..     Overall plan:  Therapies  ICU while subdural drain in place.    Discussed patient condition with RN, RT, and Pharmacy.    Emiliano Smith MD  [phone]

## 2023-04-06 ENCOUNTER — PHARMACY VISIT (OUTPATIENT)
Dept: PHARMACY | Facility: MEDICAL CENTER | Age: 88
End: 2023-04-06
Payer: COMMERCIAL

## 2023-04-06 VITALS
SYSTOLIC BLOOD PRESSURE: 125 MMHG | BODY MASS INDEX: 27.87 KG/M2 | OXYGEN SATURATION: 94 % | HEART RATE: 63 BPM | DIASTOLIC BLOOD PRESSURE: 61 MMHG | HEIGHT: 62 IN | RESPIRATION RATE: 19 BRPM | WEIGHT: 151.46 LBS | TEMPERATURE: 97.3 F

## 2023-04-06 PROBLEM — T14.90XA TRAUMA: Status: RESOLVED | Noted: 2023-04-03 | Resolved: 2023-04-06

## 2023-04-06 PROBLEM — Z75.8 DISCHARGE PLANNING ISSUES: Status: RESOLVED | Noted: 2023-04-04 | Resolved: 2023-04-06

## 2023-04-06 PROBLEM — Z02.9 DISCHARGE PLANNING ISSUES: Status: RESOLVED | Noted: 2023-04-04 | Resolved: 2023-04-06

## 2023-04-06 LAB
ALBUMIN SERPL BCP-MCNC: 3.1 G/DL (ref 3.2–4.9)
ALBUMIN/GLOB SERPL: 1.1 G/DL
ALP SERPL-CCNC: 73 U/L (ref 30–99)
ALT SERPL-CCNC: 11 U/L (ref 2–50)
ANION GAP SERPL CALC-SCNC: 9 MMOL/L (ref 7–16)
AST SERPL-CCNC: 26 U/L (ref 12–45)
BASOPHILS # BLD AUTO: 0.6 % (ref 0–1.8)
BASOPHILS # BLD: 0.03 K/UL (ref 0–0.12)
BILIRUB SERPL-MCNC: 0.3 MG/DL (ref 0.1–1.5)
BUN SERPL-MCNC: 15 MG/DL (ref 8–22)
CALCIUM ALBUM COR SERPL-MCNC: 9.3 MG/DL (ref 8.5–10.5)
CALCIUM SERPL-MCNC: 8.6 MG/DL (ref 8.5–10.5)
CHLORIDE SERPL-SCNC: 105 MMOL/L (ref 96–112)
CO2 SERPL-SCNC: 21 MMOL/L (ref 20–33)
CREAT SERPL-MCNC: 0.95 MG/DL (ref 0.5–1.4)
EOSINOPHIL # BLD AUTO: 0.45 K/UL (ref 0–0.51)
EOSINOPHIL NFR BLD: 9.2 % (ref 0–6.9)
ERYTHROCYTE [DISTWIDTH] IN BLOOD BY AUTOMATED COUNT: 47 FL (ref 35.9–50)
GFR SERPLBLD CREATININE-BSD FMLA CKD-EPI: 58 ML/MIN/1.73 M 2
GLOBULIN SER CALC-MCNC: 2.8 G/DL (ref 1.9–3.5)
GLUCOSE SERPL-MCNC: 93 MG/DL (ref 65–99)
HCT VFR BLD AUTO: 35.2 % (ref 37–47)
HGB BLD-MCNC: 11.3 G/DL (ref 12–16)
IMM GRANULOCYTES # BLD AUTO: 0.02 K/UL (ref 0–0.11)
IMM GRANULOCYTES NFR BLD AUTO: 0.4 % (ref 0–0.9)
LYMPHOCYTES # BLD AUTO: 1.3 K/UL (ref 1–4.8)
LYMPHOCYTES NFR BLD: 26.6 % (ref 22–41)
MAGNESIUM SERPL-MCNC: 2.1 MG/DL (ref 1.5–2.5)
MCH RBC QN AUTO: 27.9 PG (ref 27–33)
MCHC RBC AUTO-ENTMCNC: 32.1 G/DL (ref 33.6–35)
MCV RBC AUTO: 86.9 FL (ref 81.4–97.8)
MONOCYTES # BLD AUTO: 0.41 K/UL (ref 0–0.85)
MONOCYTES NFR BLD AUTO: 8.4 % (ref 0–13.4)
NEUTROPHILS # BLD AUTO: 2.68 K/UL (ref 2–7.15)
NEUTROPHILS NFR BLD: 54.8 % (ref 44–72)
NRBC # BLD AUTO: 0 K/UL
NRBC BLD-RTO: 0 /100 WBC
PHOSPHATE SERPL-MCNC: 4.1 MG/DL (ref 2.5–4.5)
PLATELET # BLD AUTO: 233 K/UL (ref 164–446)
PMV BLD AUTO: 10.4 FL (ref 9–12.9)
POTASSIUM SERPL-SCNC: 5 MMOL/L (ref 3.6–5.5)
PROT SERPL-MCNC: 5.9 G/DL (ref 6–8.2)
RBC # BLD AUTO: 4.05 M/UL (ref 4.2–5.4)
SODIUM SERPL-SCNC: 135 MMOL/L (ref 135–145)
WBC # BLD AUTO: 4.9 K/UL (ref 4.8–10.8)

## 2023-04-06 PROCEDURE — 85025 COMPLETE CBC W/AUTO DIFF WBC: CPT

## 2023-04-06 PROCEDURE — 700102 HCHG RX REV CODE 250 W/ 637 OVERRIDE(OP): Performed by: NURSE PRACTITIONER

## 2023-04-06 PROCEDURE — A9270 NON-COVERED ITEM OR SERVICE: HCPCS | Performed by: NURSE PRACTITIONER

## 2023-04-06 PROCEDURE — 80053 COMPREHEN METABOLIC PANEL: CPT

## 2023-04-06 PROCEDURE — 99239 HOSP IP/OBS DSCHRG MGMT >30: CPT | Performed by: NURSE PRACTITIONER

## 2023-04-06 PROCEDURE — RXMED WILLOW AMBULATORY MEDICATION CHARGE: Performed by: NURSE PRACTITIONER

## 2023-04-06 PROCEDURE — 83735 ASSAY OF MAGNESIUM: CPT

## 2023-04-06 PROCEDURE — 84100 ASSAY OF PHOSPHORUS: CPT

## 2023-04-06 RX ORDER — PANTOPRAZOLE SODIUM 40 MG/1
40 TABLET, DELAYED RELEASE ORAL DAILY
Qty: 90 TABLET | Refills: 2
Start: 2023-04-06 | End: 2023-12-21

## 2023-04-06 RX ORDER — LEVETIRACETAM 500 MG/1
500 TABLET ORAL EVERY 12 HOURS
Qty: 6 TABLET | Refills: 0 | Status: SHIPPED | OUTPATIENT
Start: 2023-04-06 | End: 2023-04-09

## 2023-04-06 RX ORDER — ACETAMINOPHEN 325 MG/1
650 TABLET ORAL EVERY 6 HOURS PRN
COMMUNITY
Start: 2023-04-06 | End: 2023-12-21

## 2023-04-06 RX ORDER — METOPROLOL SUCCINATE 50 MG/1
50 TABLET, EXTENDED RELEASE ORAL DAILY
Qty: 90 TABLET | Refills: 2
Start: 2023-04-06 | End: 2023-12-21

## 2023-04-06 RX ORDER — POTASSIUM CHLORIDE 20 MEQ/1
20 TABLET, EXTENDED RELEASE ORAL
Qty: 90 TABLET | Refills: 0
Start: 2023-04-06

## 2023-04-06 RX ADMIN — ACETAMINOPHEN 650 MG: 325 TABLET, FILM COATED ORAL at 05:36

## 2023-04-06 RX ADMIN — OMEPRAZOLE 20 MG: 20 CAPSULE, DELAYED RELEASE ORAL at 05:36

## 2023-04-06 RX ADMIN — LEVETIRACETAM 500 MG: 500 TABLET, FILM COATED ORAL at 05:36

## 2023-04-06 RX ADMIN — POTASSIUM CHLORIDE 20 MEQ: 1500 TABLET, EXTENDED RELEASE ORAL at 05:36

## 2023-04-06 RX ADMIN — LISINOPRIL 40 MG: 20 TABLET ORAL at 05:36

## 2023-04-06 ASSESSMENT — PAIN DESCRIPTION - PAIN TYPE: TYPE: ACUTE PAIN;SURGICAL PAIN

## 2023-04-06 NOTE — DISCHARGE PLANNING
Care Transition Team Discharge Planning    Anticipated Discharge Information  Discharge Disposition: D/T to home under HHA care in anticipation of covered skilled care (06)  Discharge Address: [address] [place], [place] [address]  [address]  Discharge Contact Phone Number: [phone]              Discharge Plan:  Choice form obtained for [facility], insurance approved and [facility] accepted, IMM also obtained, completed and faxed.

## 2023-04-06 NOTE — CARE PLAN
Problem: Knowledge Deficit - Standard  Goal: Patient and family/care givers will demonstrate understanding of plan of care, disease process/condition, diagnostic tests and medications  Outcome: Progressing     Problem: Skin Integrity  Goal: Skin integrity is maintained or improved  Outcome: Progressing     Problem: Pain - Standard  Goal: Alleviation of pain or a reduction in pain to the patient’s comfort goal  Outcome: Progressing     Problem: Mobility  Goal: Patient's capacity to carry out activities will improve  Outcome: Progressing     Problem: Neuro Status  Goal: Neuro status will remain stable or improve  Outcome: Progressing     Problem: Self Care  Goal: Patient will have the ability to perform ADLs independently or with assistance (bathe, groom, dress, toilet and feed)  Outcome: Progressing     The patient is Stable - Low risk of patient condition declining or worsening    Shift Goals  Clinical Goals: Mobility, neuro checks, home health eval  Patient Goals: Go Home  Family Goals: N/A    Progress made toward(s) clinical / shift goals:  Patient to discharge today, ambulated around unit, up to chair for breakfast.     Patient is not progressing towards the following goals:       [Alert] : alert [Acute Distress] : no acute distress [Normocephalic] : normocephalic [Flat Open Anterior Manning] : flat open anterior fontanelle [Icteric sclera] : nonicteric sclera [PERRL] : PERRL [Red Reflex Bilateral] : red reflex bilateral [Normally Placed Ears] : normally placed ears [Auricles Well Formed] : auricles well formed [Clear Tympanic membranes] : clear tympanic membranes [Light reflex present] : light reflex present [Bony structures visible] : bony structures visible [Patent Auditory Canal] : patent auditory canal [Discharge] : no discharge [Nares Patent] : nares patent [Palate Intact] : palate intact [Uvula Midline] : uvula midline [Supple, full passive range of motion] : supple, full passive range of motion [Palpable Masses] : no palpable masses [Symmetric Chest Rise] : symmetric chest rise [Clear to Auscultation Bilaterally] : clear to auscultation bilaterally [Regular Rate and Rhythm] : regular rate and rhythm [S1, S2 present] : S1, S2 present [Murmurs] : no murmurs [+2 Femoral Pulses] : +2 femoral pulses [Soft] : soft [Tender] : nontender [Distended] : not distended [Bowel Sounds] : bowel sounds present [Umbilical Stump Dry, Clean, Intact] : umbilical stump dry, clean, intact [Hepatomegaly] : no hepatomegaly [Splenomegaly] : no splenomegaly [Normal external genitalia] : normal external genitalia [Clitoromegaly] : no clitoromegaly [Patent Vagina] : patent vagina [Patent] : patent [Normally Placed] : normally placed [No Abnormal Lymph Nodes Palpated] : no abnormal lymph nodes palpated [Cox-Ortolani] : negative Cox-Ortolani [Symmetric Flexed Extremities] : symmetric flexed extremities [Spinal Dimple] : no spinal dimple [Tuft of Hair] : no tuft of hair [Startle Reflex] : startle reflex present [Suck Reflex] : suck reflex present [Rooting] : rooting reflex present [Palmar Grasp] : palmar grasp present [Plantar Grasp] : plantar reflex present [Symmetric Robert] : symmetric Brentford [Jaundice] : jaundice [de-identified] : jaundice till below umbilicus

## 2023-04-06 NOTE — PROGRESS NOTES
Neurosurgery Progress Note    Subjective:  No events overnight   Occasional blurry vision, improves on its own  HA on left side at surgical site    Exam:  AAO, fluent speech   PERRL, EOMI   ARITA fs, sensation intact   No facial droop  Left SDD 4/overnight   Incision staples CDI, no redness, drainage, or swelling  Ambulatory    BP  Min: 82/44  Max: 155/67  Pulse  Av.5  Min: 49  Max: 72  Resp  Av.1  Min: 12  Max: 53  Temp  Av.4 °C (97.5 °F)  Min: 36.3 °C (97.4 °F)  Max: 36.6 °C (97.8 °F)  SpO2  Av.7 %  Min: 91 %  Max: 100 %    No data recorded    Recent Labs     23  0253 23  0400 23  0619   WBC 5.5 7.2 6.8   RBC 4.58 3.95* 4.26   HEMOGLOBIN 12.6 11.0* 12.0   HEMATOCRIT 39.0 33.5* 36.9*   MCV 85.2 84.8 86.6   MCH 27.5 27.8 28.2   MCHC 32.3* 32.8* 32.5*   RDW 44.3 44.2 46.5   PLATELETCT 266 246 255   MPV 10.3 10.8 10.6       Recent Labs     23  1121 23  0400 23  0619   SODIUM 131* 133* 137   POTASSIUM 4.0 4.7 4.8   CHLORIDE 98 102 105   CO2 18* 20 23   GLUCOSE 86 121* 94   BUN 11 23* 19   CREATININE 0.94 1.14 1.05   CALCIUM 8.2* 8.4* 8.9       Recent Labs     23  0253   APTT 29.7   INR 0.96       Recent Labs     23  0415   REACTMIN 4.7   CLOTKINET 1.3   CLOTANGL 73.3   MAXCLOTS 60.2   PRCINADP 15.7   PRCINAA 29.1*         Intake/Output                         23 07 - 23 0659 23 - 23 0659     6731-9932 7513-4108 Total [id number] Total                 Intake    P.O.  722  150 872  --  -- --    P.O. 722 150 872 -- -- --    Total Intake 722 150 872 -- -- --       Output    Urine  200  -- 200  --  -- --    Number of Times Voided 5 x 4 x 9 x 4 x -- 4 x    Urine Void (mL) 200 -- 200 -- -- --    Drains  3  4 7  0  -- 0    Output (ml) (Subdural Drain Group Left) 3 4 7 0 -- 0    Stool  --  -- --  --  -- --    Number of Times Stooled 1 x 1 x 2 x 2 x -- 2 x    Total Output 203 4 207 0 -- 0       Net I/O     519 146 665 0 -- 0               Intake/Output Summary (Last 24 hours) at 4/5/2023 1702  Last data filed at 4/5/2023 0800  Gross per 24 hour   Intake 372 ml   Output 5 ml   Net 367 ml               Respiratory Therapy Consult   Continuous RT    Pharmacy Consult Request  1 Each PHARMACY TO DOSE    ondansetron  4 mg Q4HRS PRN    docusate sodium  100 mg BID    senna-docusate  1 Tablet Nightly    senna-docusate  1 Tablet Q24HRS PRN    polyethylene glycol/lytes  1 Packet BID    magnesium hydroxide  30 mL DAILY    bisacodyl  10 mg Q24HRS PRN    sodium phosphate  1 Each Once PRN    acetaminophen  650 mg Q6HRS    Followed by    [START ON 4/8/2023] acetaminophen  650 mg Q6HRS PRN    oxyCODONE immediate-release  2.5 mg Q3HRS PRN    Or    oxyCODONE immediate-release  5 mg Q3HRS PRN    Or    HYDROmorphone  0.25 mg Q3HRS PRN    levETIRAcetam  500 mg Q12HRS    Or    levETIRAcetam (Keppra) IV  500 mg Q12HRS    ondansetron  4 mg Q4HRS PRN    promethazine  12.5 mg Q6HRS PRN    lisinopril  40 mg DAILY    metoprolol SR  50 mg DAILY    omeprazole  20 mg DAILY    potassium chloride SA  20 mEq QDAY    simvastatin  20 mg Q EVENING       Assessment and Plan:  Hospital day # 3  POD #2 Left bur hole   Prophylactic anticoagulation: no         Start date/time: TBD     Plan:  Stable neuro exam  CT head stable   SDD discontinued.   -ok to shower in 24 hours  Pt clear to DC from NSG pov.   -our office will call her to schedule 2 week post op f/u appt    Ok to shower in 24 hours, wash hair and surgical incision with baby shampoo.   Pat incision dry, leave open to air- no dressing required  No Aspirin or NSAIDS (Aleve, Motrin, Advil) until cleared by Dr. Rodriguez  No strenuous activity. No activity that could result in repeat head injury.   No lifting greater than 10 pounds.  Follow up at [facility] 2 weeks after surgery.   Our office will call to schedule this appt with patient.

## 2023-04-06 NOTE — PROGRESS NOTES
Dc instructions reviewed w/ pt and sister, verbalized understanding. Meds to bed delivered. No piv on arrival to dcl.

## 2023-04-06 NOTE — DISCHARGE SUMMARY
Trauma Discharge Summary    DATE OF ADMISSION: 4/3/2023    DATE OF DISCHARGE: 4/6/2023    LENGTH OF STAY: 3 days    ATTENDING PHYSICIAN: Josh Hu D.O.    CONSULTING PHYSICIAN:   1.  Dr. Shmuel Rodriguez, neurosurgery    DISCHARGE DIAGNOSIS:  Principal Problem (Resolved):    Trauma POA: Yes  Active Problems:    Subdural hematoma, post-traumatic (HCC) POA: Yes    Contraindication to deep vein thrombosis (DVT) prophylaxis POA: Yes    Alcohol abuse POA: Yes    Hyponatremia POA: Yes    Essential hypertension POA: Yes    GERD (gastroesophageal reflux disease) POA: Yes    Tobacco abuse POA: Yes    Dyslipidemia POA: Yes    Aspirin long-term use POA: Yes    Hypokalemia POA: Yes  Resolved Problems:    Discharge planning issues POA: Yes      PROCEDURES:   Left bur hole drainage subdural hematoma, left frontal subdural drain placement by Dr. Shmuel Rodriguez on 4/3/2023.    HISTORY OF PRESENT ILLNESS: The patient is a 88 y.o. female who was reportedly injured in a ground-level fall while intoxicated.  She was transferred to [facility] in [place], [place].    HOSPITAL COURSE: The patient was triaged as a consult activation.  She did have a acute on subacute appearing lateral hemispheric subdural hematoma with a leftward midline shift.  The patient was transported to the trauma tensive care unit she was evaluated by Dr. Shmuel Rodriguez with neurosurgery and she underwent a left bur hole evacuation of hematoma and subdural drain placement.  She remained in the trauma intensive care unit while her subdural drain was in place.  She worked with physical, occupational, and speech therapies.  She progressed to the point of being able to discharge home with home health.  She had her drain removed on 4/5/2023.  She is currently tolerating room air and a regular diet.  She is ambulating independently reporting adequate pain control without the use of any narcotic pain medications.  The patient has been educated to  refrain from any alcohol use until cleared by neurosurgery.  Her incision is healing without signs of infection.    HOSPITAL PROBLEM LIST:  * Trauma-resolved as of 4/6/2023, (present on admission)  Assessment & Plan  GLF. Fell backwards striking her head.  TBI Alert.  Josh Hu DO. Trauma Surgery.    Subdural hematoma, post-traumatic (HCC)- (present on admission)  Assessment & Plan  Admission imaging with 11.7 mm acute on subacute appearing lateral hemispheric subdural hematoma with 1.5 mm left right midline shift.  4/3 Left stevie hole evacuation of hematoma and subdural drain placement.  4/5 Drain removal.  Post traumatic pharmacologic seizure prophylaxis for 1 week.  Speech Language Pathology cognitive evaluation recommending supervision upon discharge.  Shmuel Rodriguez MD, PhD. Neurosurgeon. [facility].     Hyponatremia- (present on admission)  Assessment & Plan  Likely chronic. Admission serum Na 127 mmol/L.  NaCl infusion. Hydrochlorothiazide stopped.  4/5 Normalized.    Alcohol abuse- (present on admission)  Assessment & Plan  Admission blood alcohol level of 0.25.  Trauma alcohol withdrawal protocol initiated.  Alcohol withdrawal surveillance.  4/3 Brief intervention completed.  4/6 Instructed to refrain from alcohol.    Contraindication to deep vein thrombosis (DVT) prophylaxis- (present on admission)  Assessment & Plan  Prophylactic anticoagulation for thrombotic prevention initially contraindicated secondary to elevated bleeding risk.  4/3 Trauma surveillance venous duplex scanning ordered.  4/4 Trauma screening bilateral lower extremity venous duplex negative for above knee DVT.    Hypokalemia- (present on admission)  Assessment & Plan  Chronic condition treated with potassium chloride.  Resumed maintenance medication on admission.     Aspirin long-term use- (present on admission)  Assessment & Plan  TEG with 29.1% AA inhibition and 15.7 ADP inhibition.    Dyslipidemia- (present on  admission)  Assessment & Plan  Chronic condition treated with simvastatin.  Resumed maintenance medication on admission.    GERD (gastroesophageal reflux disease)- (present on admission)  Assessment & Plan  Chronic condition treated with pantoprazole.  Resumed maintenance medication on admission.     Essential hypertension- (present on admission)  Assessment & Plan  Chronic condition treated with metoprolol, hydrochlorothiazide, lisinopril.  Resumed maintenance medications on admission with the exception of hydrochlorothiazide secondary to hyponatremia.     Discharge planning issues-resolved as of 4/6/2023, (present on admission)  Assessment & Plan  Date of admission: 4/3/2023.  4/4 Home Health ordered.  4/6 Home health reordered.  Cleared for discharge: Yes - Date: 4/6.  Discharge delayed: No.  Discharge date: 4/6.      DISPOSITION: Discharged home under the care and supervision of family on 4/6/2023. The patient was counseled and questions were answered. Specifically, signs and symptoms of infection, respiratory decompensation, changes in mentation and persistent or worsening pain were discussed and the patient agrees to seek medical attention if any of these develop.    DISCHARGE MEDICATIONS:     Medication List        START taking these medications        Instructions   acetaminophen 325 MG Tabs  Commonly known as: Tylenol   Take 2 Tablets by mouth every 6 hours as needed for Moderate Pain or Mild Pain.  Dose: 650 mg     levETIRAcetam 500 MG Tabs  Commonly known as: KEPPRA   Take 1 Tablet by mouth every 12 hours for 3 days.  Dose: 500 mg            CHANGE how you take these medications        Instructions   potassium chloride SA 20 MEQ Tbcr  What changed: Another medication with the same name was removed. Continue taking this medication, and follow the directions you see here.  Commonly known as: Kdur   Take 1 Tablet by mouth every day.  Dose: 20 mEq            CONTINUE taking these medications         Instructions   cyanocobalamin 100 MCG Tabs  Commonly known as: VITAMIN B-12   Take 100 mcg by mouth every day.  Dose: 100 mcg     D 2000 2000 UNIT Tabs  Generic drug: Cholecalciferol   Take 2,000 Units by mouth every day.  Dose: 2,000 Units     furosemide 40 MG Tabs  Commonly known as: LASIX   Take 40 mg by mouth every day.  Dose: 40 mg     lisinopril 10 MG Tabs  Commonly known as: PRINIVIL   Take 10 mg by mouth every day.  Dose: 10 mg     metoprolol SR 50 MG Tb24  Commonly known as: TOPROL XL   Take 1 Tablet by mouth every day.  Dose: 50 mg     multivitamin Tabs   Take 1 Tablet by mouth every day.  Dose: 1 Tablet     pantoprazole 40 MG Tbec  Commonly known as: PROTONIX   Take 1 Tablet by mouth every day.  Dose: 40 mg     psyllium 58.12 % Pack  Commonly known as: METAMUCIL   Take 1 Packet by mouth every day.  Dose: 1 Packet     simvastatin 10 MG Tabs  Commonly known as: ZOCOR   Take 10 mg by mouth every evening.  Dose: 10 mg            STOP taking these medications      fish oil 1000 MG Caps capsule              ACTIVITY:  No strenuous exercise or heavy lifting.    WOUND CARE:  Keep incision clean and dry, may shower.    DIET:  Orders Placed This Encounter   Procedures    Diet Order Diet: Regular     Standing Status:   Standing     Number of Occurrences:   1     Order Specific Question:   Diet:     Answer:   Regular [1]       FOLLOW UP:  Shmuel Rodriguez M.D.  [address]  [place] [address]  [phone]    Schedule an appointment as soon as possible for a visit in 2 week(s)        TIME SPENT ON DISCHARGE: 35 minutes      ____________________________________________  AUBREY Miller    DD: 4/6/2023 10:22 AM

## 2023-04-06 NOTE — DISCHARGE INSTRUCTIONS
- Call or seek medical attention for questions or concerns  - Follow up with the [facility] as needed  - Follow up with Dr. Rodriguez in 2 weeks time  - Ok to shower in 24 hours, wash hair and surgical incision with baby shampoo. Pat incision dry, leave open to air- no dressing required  - No Aspirin or NSAIDS (Aleve, Motrin, Advil) until cleared by Dr. Rodriguez  - No strenuous activity. No activity that could result in repeat head injury.   - No lifting greater than 10 pounds.  - Resume regular diet  - May take over the counter acetaminophen as needed for pain  - No alcochol until cleared by Dr. Rodriguez  - If respiratory decompensation, persistent or worsening pain, changes in mentation, or signs or symptoms of infection occur seek medical attention     Ok to shower in 24 hours, wash hair and surgical incision with baby shampoo.   Pat incision dry, leave open to air- no dressing required  No Aspirin or NSAIDS (Aleve, Motrin, Advil) until cleared by Dr. Rodriguez  No strenuous activity. No activity that could result in repeat head injury.   No lifting greater than 10 pounds.  Follow up at [facility] 2 weeks after surgery.   Our office will call to schedule this appt with patient.

## 2023-04-06 NOTE — FACE TO FACE
Face to Face Supporting Documentation - Home Health    The encounter with this patient was in whole or in part the primary reason for home health admission.    Date of encounter:   Patient:                    MRN:                       YOB: 2022  Lily Lloyd  [id number]  1935     Home health to see patient for:  Skilled Nursing care for assessment, interventions & education, Physical Therapy evaluation and treatment, and Occupational therapy evaluation and treatment    Skilled need for:  Comment: Subdural hematoma s/p  left stevie hole    Skilled nursing interventions to include:  Wound Care and Comment: PT/OT , SLP for cognitive    Homebound status evidenced by:  Needs the assistance of another person in order to leave the home. Leaving home requires a considerable and taxing effort. There is a normal inability to leave the home.    Community Physician to provide follow up care: Orlin Chacon M.D.     Optional Interventions? No      I certify the face to face encounter for this home health care referral meets the CMS requirements and the encounter/clinical assessment with the patient was, in whole, or in part, for the medical condition(s) listed above, which is the primary reason for home health care. Based on my clinical findings: the service(s) are medically necessary, support the need for home health care, and the homebound criteria are met.  I certify that this patient has had a face to face encounter by myself.  DAISY Miller. - KATIAI: [id number]

## 2023-04-06 NOTE — PROGRESS NOTES
Discharge orders received.  Patient dressed, family notified of discharge.  Patient sent to discharge lounge with all belongings, including cell phone, awaiting meds to beds.

## 2023-04-06 NOTE — CARE PLAN
The patient is Stable - Low risk of patient condition declining or worsening    Shift Goals  Clinical Goals: neuro q4, pain control  Patient Goals: go home soon and get a good nights rest  Family Goals: n/a    Progress made toward(s) clinical / shift goals:    Problem: Knowledge Deficit - Standard  Goal: Patient and family/care givers will demonstrate understanding of plan of care, disease process/condition, diagnostic tests and medications  Outcome: Progressing        Problem: Pain - Standard  Goal: Alleviation of pain or a reduction in pain to the patient’s comfort goal  Outcome: Progressing     Problem: Mobility  Goal: Patient's capacity to carry out activities will improve  Outcome: Progressing     Problem: Neuro Status  Goal: Neuro status will remain stable or improve  Outcome: Progressing     Problem: Self Care  Goal: Patient will have the ability to perform ADLs independently or with assistance (bathe, groom, dress, toilet and feed)  Outcome: Progressing

## 2023-12-21 ENCOUNTER — HOSPITAL ENCOUNTER (INPATIENT)
Facility: MEDICAL CENTER | Age: 88
LOS: 1 days | DRG: 641 | End: 2023-12-22
Attending: EMERGENCY MEDICINE | Admitting: STUDENT IN AN ORGANIZED HEALTH CARE EDUCATION/TRAINING PROGRAM
Payer: MEDICARE

## 2023-12-21 ENCOUNTER — APPOINTMENT (OUTPATIENT)
Dept: RADIOLOGY | Facility: MEDICAL CENTER | Age: 88
DRG: 641 | End: 2023-12-21
Attending: EMERGENCY MEDICINE
Payer: MEDICARE

## 2023-12-21 ENCOUNTER — APPOINTMENT (OUTPATIENT)
Dept: CARDIOLOGY | Facility: MEDICAL CENTER | Age: 88
DRG: 641 | End: 2023-12-21
Attending: HOSPITALIST
Payer: MEDICARE

## 2023-12-21 DIAGNOSIS — E87.1 HYPONATREMIA: ICD-10-CM

## 2023-12-21 DIAGNOSIS — J44.1 ACUTE EXACERBATION OF CHRONIC OBSTRUCTIVE PULMONARY DISEASE (COPD) (HCC): ICD-10-CM

## 2023-12-21 DIAGNOSIS — Z72.0 TOBACCO ABUSE: ICD-10-CM

## 2023-12-21 DIAGNOSIS — R55 SYNCOPE, UNSPECIFIED SYNCOPE TYPE: Primary | ICD-10-CM

## 2023-12-21 DIAGNOSIS — F10.10 ALCOHOL ABUSE: ICD-10-CM

## 2023-12-21 DIAGNOSIS — E87.20 LACTIC ACIDOSIS: ICD-10-CM

## 2023-12-21 DIAGNOSIS — I95.9 HYPOTENSION, UNSPECIFIED HYPOTENSION TYPE: ICD-10-CM

## 2023-12-21 DIAGNOSIS — R00.1 BRADYCARDIA: ICD-10-CM

## 2023-12-21 DIAGNOSIS — F10.920 ALCOHOLIC INTOXICATION WITHOUT COMPLICATION (HCC): ICD-10-CM

## 2023-12-21 PROBLEM — D32.9 MENINGIOMA (HCC): Status: ACTIVE | Noted: 2023-12-21

## 2023-12-21 PROBLEM — E87.29 ALCOHOLIC KETOACIDOSIS: Status: ACTIVE | Noted: 2023-12-21

## 2023-12-21 LAB
ALBUMIN SERPL BCP-MCNC: 3.8 G/DL (ref 3.2–4.9)
ALBUMIN/GLOB SERPL: 1.9 G/DL
ALP SERPL-CCNC: 63 U/L (ref 30–99)
ALT SERPL-CCNC: 12 U/L (ref 2–50)
AMPHET UR QL SCN: NEGATIVE
ANION GAP SERPL CALC-SCNC: 16 MMOL/L (ref 7–16)
ANION GAP SERPL CALC-SCNC: 16 MMOL/L (ref 7–16)
ANION GAP SERPL CALC-SCNC: 20 MMOL/L (ref 7–16)
APPEARANCE UR: CLEAR
AST SERPL-CCNC: 24 U/L (ref 12–45)
BACTERIA #/AREA URNS HPF: NEGATIVE /HPF
BARBITURATES UR QL SCN: NEGATIVE
BASE EXCESS BLDV CALC-SCNC: -9 MMOL/L
BASOPHILS # BLD AUTO: 0.6 % (ref 0–1.8)
BASOPHILS # BLD: 0.04 K/UL (ref 0–0.12)
BENZODIAZ UR QL SCN: NEGATIVE
BILIRUB SERPL-MCNC: 0.2 MG/DL (ref 0.1–1.5)
BILIRUB UR QL STRIP.AUTO: NEGATIVE
BODY TEMPERATURE: 36.7 CENTIGRADE
BUN SERPL-MCNC: 14 MG/DL (ref 8–22)
BUN SERPL-MCNC: 18 MG/DL (ref 8–22)
BUN SERPL-MCNC: 18 MG/DL (ref 8–22)
BZE UR QL SCN: NEGATIVE
CALCIUM ALBUM COR SERPL-MCNC: 8.6 MG/DL (ref 8.5–10.5)
CALCIUM SERPL-MCNC: 8.4 MG/DL (ref 8.5–10.5)
CALCIUM SERPL-MCNC: 8.7 MG/DL (ref 8.5–10.5)
CALCIUM SERPL-MCNC: 9 MG/DL (ref 8.5–10.5)
CANNABINOIDS UR QL SCN: NEGATIVE
CHLORIDE SERPL-SCNC: 88 MMOL/L (ref 96–112)
CHLORIDE SERPL-SCNC: 94 MMOL/L (ref 96–112)
CHLORIDE SERPL-SCNC: 99 MMOL/L (ref 96–112)
CO2 SERPL-SCNC: 15 MMOL/L (ref 20–33)
CO2 SERPL-SCNC: 19 MMOL/L (ref 20–33)
CO2 SERPL-SCNC: 19 MMOL/L (ref 20–33)
COLOR UR: YELLOW
CREAT SERPL-MCNC: 0.9 MG/DL (ref 0.5–1.4)
CREAT SERPL-MCNC: 1 MG/DL (ref 0.5–1.4)
CREAT SERPL-MCNC: 1.16 MG/DL (ref 0.5–1.4)
EKG IMPRESSION: NORMAL
EKG IMPRESSION: NORMAL
EOSINOPHIL # BLD AUTO: 0.29 K/UL (ref 0–0.51)
EOSINOPHIL NFR BLD: 4.6 % (ref 0–6.9)
EPI CELLS #/AREA URNS HPF: NEGATIVE /HPF
ERYTHROCYTE [DISTWIDTH] IN BLOOD BY AUTOMATED COUNT: 45 FL (ref 35.9–50)
ETHANOL BLD-MCNC: 257.9 MG/DL
FENTANYL UR QL: NEGATIVE
GFR SERPLBLD CREATININE-BSD FMLA CKD-EPI: 45 ML/MIN/1.73 M 2
GFR SERPLBLD CREATININE-BSD FMLA CKD-EPI: 54 ML/MIN/1.73 M 2
GFR SERPLBLD CREATININE-BSD FMLA CKD-EPI: 62 ML/MIN/1.73 M 2
GLOBULIN SER CALC-MCNC: 2 G/DL (ref 1.9–3.5)
GLUCOSE SERPL-MCNC: 143 MG/DL (ref 65–99)
GLUCOSE SERPL-MCNC: 62 MG/DL (ref 65–99)
GLUCOSE SERPL-MCNC: 87 MG/DL (ref 65–99)
GLUCOSE UR STRIP.AUTO-MCNC: NEGATIVE MG/DL
HCO3 BLDV-SCNC: 18 MMOL/L (ref 24–28)
HCT VFR BLD AUTO: 34.1 % (ref 37–47)
HGB BLD-MCNC: 11.5 G/DL (ref 12–16)
HYALINE CASTS #/AREA URNS LPF: NORMAL /LPF
IMM GRANULOCYTES # BLD AUTO: 0.01 K/UL (ref 0–0.11)
IMM GRANULOCYTES NFR BLD AUTO: 0.2 % (ref 0–0.9)
INHALED O2 FLOW RATE: ABNORMAL L/MIN
KETONES UR STRIP.AUTO-MCNC: 15 MG/DL
LACTATE SERPL-SCNC: 1.2 MMOL/L (ref 0.5–2)
LACTATE SERPL-SCNC: 3 MMOL/L (ref 0.5–2)
LACTATE SERPL-SCNC: 4.7 MMOL/L (ref 0.5–2)
LACTATE SERPL-SCNC: 5.6 MMOL/L (ref 0.5–2)
LACTATE SERPL-SCNC: 5.7 MMOL/L (ref 0.5–2)
LEUKOCYTE ESTERASE UR QL STRIP.AUTO: NEGATIVE
LV EJECT FRACT  99904: 73
LV EJECT FRACT MOD 2C 99903: 80.54
LV EJECT FRACT MOD 4C 99902: 67.63
LV EJECT FRACT MOD BP 99901: 73.93
LYMPHOCYTES # BLD AUTO: 3.29 K/UL (ref 1–4.8)
LYMPHOCYTES NFR BLD: 52.6 % (ref 22–41)
MAGNESIUM SERPL-MCNC: 1.9 MG/DL (ref 1.5–2.5)
MAGNESIUM SERPL-MCNC: 1.9 MG/DL (ref 1.5–2.5)
MCH RBC QN AUTO: 27.3 PG (ref 27–33)
MCHC RBC AUTO-ENTMCNC: 33.7 G/DL (ref 32.2–35.5)
MCV RBC AUTO: 80.8 FL (ref 81.4–97.8)
METHADONE UR QL SCN: NEGATIVE
MICRO URNS: ABNORMAL
MONOCYTES # BLD AUTO: 0.32 K/UL (ref 0–0.85)
MONOCYTES NFR BLD AUTO: 5.1 % (ref 0–13.4)
NEUTROPHILS # BLD AUTO: 2.3 K/UL (ref 1.82–7.42)
NEUTROPHILS NFR BLD: 36.9 % (ref 44–72)
NITRITE UR QL STRIP.AUTO: NEGATIVE
NRBC # BLD AUTO: 0 K/UL
NRBC BLD-RTO: 0 /100 WBC (ref 0–0.2)
NT-PROBNP SERPL IA-MCNC: 79 PG/ML (ref 0–125)
OPIATES UR QL SCN: NEGATIVE
OSMOLALITY UR: 199 MOSM/KG H2O (ref 300–900)
OXYCODONE UR QL SCN: NEGATIVE
PCO2 BLDV: 41.9 MMHG (ref 41–51)
PCO2 TEMP ADJ BLDV: 41.4 MMHG (ref 41–51)
PCP UR QL SCN: NEGATIVE
PH BLDV: 7.24 [PH] (ref 7.31–7.45)
PH TEMP ADJ BLDV: 7.24 [PH] (ref 7.31–7.45)
PH UR STRIP.AUTO: 5.5 [PH] (ref 5–8)
PLATELET # BLD AUTO: 241 K/UL (ref 164–446)
PMV BLD AUTO: 10 FL (ref 9–12.9)
PO2 BLDV: 36.1 MMHG (ref 25–40)
PO2 TEMP ADJ BLDV: 35.3 MMHG (ref 25–40)
POTASSIUM SERPL-SCNC: 3.4 MMOL/L (ref 3.6–5.5)
POTASSIUM SERPL-SCNC: 4.6 MMOL/L (ref 3.6–5.5)
POTASSIUM SERPL-SCNC: 4.9 MMOL/L (ref 3.6–5.5)
PROPOXYPH UR QL SCN: NEGATIVE
PROT SERPL-MCNC: 5.8 G/DL (ref 6–8.2)
PROT UR QL STRIP: NEGATIVE MG/DL
RBC # BLD AUTO: 4.22 M/UL (ref 4.2–5.4)
RBC # URNS HPF: NORMAL /HPF
RBC UR QL AUTO: ABNORMAL
SAO2 % BLDV: 54.7 %
SODIUM SERPL-SCNC: 123 MMOL/L (ref 135–145)
SODIUM SERPL-SCNC: 129 MMOL/L (ref 135–145)
SODIUM SERPL-SCNC: 134 MMOL/L (ref 135–145)
SODIUM UR-SCNC: 58 MMOL/L
SP GR UR STRIP.AUTO: 1
TROPONIN T SERPL-MCNC: 13 NG/L (ref 6–19)
UROBILINOGEN UR STRIP.AUTO-MCNC: 0.2 MG/DL
WBC # BLD AUTO: 6.3 K/UL (ref 4.8–10.8)
WBC #/AREA URNS HPF: NORMAL /HPF

## 2023-12-21 PROCEDURE — 700105 HCHG RX REV CODE 258: Performed by: STUDENT IN AN ORGANIZED HEALTH CARE EDUCATION/TRAINING PROGRAM

## 2023-12-21 PROCEDURE — 70450 CT HEAD/BRAIN W/O DYE: CPT

## 2023-12-21 PROCEDURE — 81001 URINALYSIS AUTO W/SCOPE: CPT

## 2023-12-21 PROCEDURE — 80053 COMPREHEN METABOLIC PANEL: CPT

## 2023-12-21 PROCEDURE — 700105 HCHG RX REV CODE 258: Performed by: HOSPITALIST

## 2023-12-21 PROCEDURE — 700111 HCHG RX REV CODE 636 W/ 250 OVERRIDE (IP): Mod: JZ | Performed by: STUDENT IN AN ORGANIZED HEALTH CARE EDUCATION/TRAINING PROGRAM

## 2023-12-21 PROCEDURE — 93005 ELECTROCARDIOGRAM TRACING: CPT | Performed by: EMERGENCY MEDICINE

## 2023-12-21 PROCEDURE — 84300 ASSAY OF URINE SODIUM: CPT

## 2023-12-21 PROCEDURE — 700101 HCHG RX REV CODE 250: Performed by: EMERGENCY MEDICINE

## 2023-12-21 PROCEDURE — 83880 ASSAY OF NATRIURETIC PEPTIDE: CPT

## 2023-12-21 PROCEDURE — 83605 ASSAY OF LACTIC ACID: CPT | Mod: 91

## 2023-12-21 PROCEDURE — 94664 DEMO&/EVAL PT USE INHALER: CPT

## 2023-12-21 PROCEDURE — 82803 BLOOD GASES ANY COMBINATION: CPT

## 2023-12-21 PROCEDURE — 80307 DRUG TEST PRSMV CHEM ANLYZR: CPT

## 2023-12-21 PROCEDURE — 700111 HCHG RX REV CODE 636 W/ 250 OVERRIDE (IP): Performed by: HOSPITALIST

## 2023-12-21 PROCEDURE — 80048 BASIC METABOLIC PNL TOTAL CA: CPT | Mod: 91

## 2023-12-21 PROCEDURE — 700105 HCHG RX REV CODE 258: Performed by: EMERGENCY MEDICINE

## 2023-12-21 PROCEDURE — 71045 X-RAY EXAM CHEST 1 VIEW: CPT

## 2023-12-21 PROCEDURE — 84425 ASSAY OF VITAMIN B-1: CPT

## 2023-12-21 PROCEDURE — HZ2ZZZZ DETOXIFICATION SERVICES FOR SUBSTANCE ABUSE TREATMENT: ICD-10-PCS | Performed by: STUDENT IN AN ORGANIZED HEALTH CARE EDUCATION/TRAINING PROGRAM

## 2023-12-21 PROCEDURE — 84100 ASSAY OF PHOSPHORUS: CPT

## 2023-12-21 PROCEDURE — 85027 COMPLETE CBC AUTOMATED: CPT

## 2023-12-21 PROCEDURE — 36415 COLL VENOUS BLD VENIPUNCTURE: CPT

## 2023-12-21 PROCEDURE — 93306 TTE W/DOPPLER COMPLETE: CPT

## 2023-12-21 PROCEDURE — A9270 NON-COVERED ITEM OR SERVICE: HCPCS | Performed by: STUDENT IN AN ORGANIZED HEALTH CARE EDUCATION/TRAINING PROGRAM

## 2023-12-21 PROCEDURE — 94640 AIRWAY INHALATION TREATMENT: CPT

## 2023-12-21 PROCEDURE — 83935 ASSAY OF URINE OSMOLALITY: CPT

## 2023-12-21 PROCEDURE — 99223 1ST HOSP IP/OBS HIGH 75: CPT | Mod: AI,25 | Performed by: STUDENT IN AN ORGANIZED HEALTH CARE EDUCATION/TRAINING PROGRAM

## 2023-12-21 PROCEDURE — 93005 ELECTROCARDIOGRAM TRACING: CPT

## 2023-12-21 PROCEDURE — 99406 BEHAV CHNG SMOKING 3-10 MIN: CPT

## 2023-12-21 PROCEDURE — 93306 TTE W/DOPPLER COMPLETE: CPT | Mod: 26 | Performed by: INTERNAL MEDICINE

## 2023-12-21 PROCEDURE — 83735 ASSAY OF MAGNESIUM: CPT | Mod: 91

## 2023-12-21 PROCEDURE — 700102 HCHG RX REV CODE 250 W/ 637 OVERRIDE(OP): Performed by: STUDENT IN AN ORGANIZED HEALTH CARE EDUCATION/TRAINING PROGRAM

## 2023-12-21 PROCEDURE — 99406 BEHAV CHNG SMOKING 3-10 MIN: CPT | Performed by: STUDENT IN AN ORGANIZED HEALTH CARE EDUCATION/TRAINING PROGRAM

## 2023-12-21 PROCEDURE — 85025 COMPLETE CBC W/AUTO DIFF WBC: CPT

## 2023-12-21 PROCEDURE — 99285 EMERGENCY DEPT VISIT HI MDM: CPT

## 2023-12-21 PROCEDURE — 82077 ASSAY SPEC XCP UR&BREATH IA: CPT

## 2023-12-21 PROCEDURE — 770020 HCHG ROOM/CARE - TELE (206)

## 2023-12-21 PROCEDURE — 84484 ASSAY OF TROPONIN QUANT: CPT

## 2023-12-21 RX ORDER — IPRATROPIUM BROMIDE AND ALBUTEROL SULFATE 2.5; .5 MG/3ML; MG/3ML
3 SOLUTION RESPIRATORY (INHALATION)
Status: DISCONTINUED | OUTPATIENT
Start: 2023-12-21 | End: 2023-12-21

## 2023-12-21 RX ORDER — SODIUM CHLORIDE, SODIUM LACTATE, POTASSIUM CHLORIDE, CALCIUM CHLORIDE 600; 310; 30; 20 MG/100ML; MG/100ML; MG/100ML; MG/100ML
INJECTION, SOLUTION INTRAVENOUS CONTINUOUS
Status: DISCONTINUED | OUTPATIENT
Start: 2023-12-21 | End: 2023-12-22

## 2023-12-21 RX ORDER — FOLIC ACID 1 MG/1
1 TABLET ORAL DAILY
Status: DISCONTINUED | OUTPATIENT
Start: 2023-12-21 | End: 2023-12-22 | Stop reason: HOSPADM

## 2023-12-21 RX ORDER — POTASSIUM CHLORIDE 20 MEQ/1
20 TABLET, EXTENDED RELEASE ORAL EVERY MORNING
Status: DISCONTINUED | OUTPATIENT
Start: 2023-12-22 | End: 2023-12-22

## 2023-12-21 RX ORDER — LORAZEPAM 1 MG/1
1 TABLET ORAL EVERY 4 HOURS PRN
Status: DISCONTINUED | OUTPATIENT
Start: 2023-12-21 | End: 2023-12-22 | Stop reason: HOSPADM

## 2023-12-21 RX ORDER — FLUTICASONE PROPIONATE 50 MCG
1 SPRAY, SUSPENSION (ML) NASAL DAILY
COMMUNITY

## 2023-12-21 RX ORDER — SIMVASTATIN 20 MG
10 TABLET ORAL EVERY EVENING
Status: DISCONTINUED | OUTPATIENT
Start: 2023-12-21 | End: 2023-12-22 | Stop reason: HOSPADM

## 2023-12-21 RX ORDER — PREDNISONE 20 MG/1
40 TABLET ORAL DAILY
Status: DISCONTINUED | OUTPATIENT
Start: 2023-12-21 | End: 2023-12-21

## 2023-12-21 RX ORDER — POTASSIUM CHLORIDE 20 MEQ/1
40 TABLET, EXTENDED RELEASE ORAL ONCE
Status: COMPLETED | OUTPATIENT
Start: 2023-12-21 | End: 2023-12-21

## 2023-12-21 RX ORDER — POLYETHYLENE GLYCOL 3350 17 G/17G
1 POWDER, FOR SOLUTION ORAL
Status: DISCONTINUED | OUTPATIENT
Start: 2023-12-21 | End: 2023-12-22 | Stop reason: HOSPADM

## 2023-12-21 RX ORDER — BISACODYL 10 MG
10 SUPPOSITORY, RECTAL RECTAL
Status: DISCONTINUED | OUTPATIENT
Start: 2023-12-21 | End: 2023-12-22 | Stop reason: HOSPADM

## 2023-12-21 RX ORDER — DIAZEPAM 5 MG/ML
10 INJECTION, SOLUTION INTRAMUSCULAR; INTRAVENOUS
Status: DISCONTINUED | OUTPATIENT
Start: 2023-12-21 | End: 2023-12-22 | Stop reason: HOSPADM

## 2023-12-21 RX ORDER — ENOXAPARIN SODIUM 100 MG/ML
40 INJECTION SUBCUTANEOUS DAILY
Status: DISCONTINUED | OUTPATIENT
Start: 2023-12-21 | End: 2023-12-22 | Stop reason: HOSPADM

## 2023-12-21 RX ORDER — LORAZEPAM 2 MG/1
4 TABLET ORAL
Status: DISCONTINUED | OUTPATIENT
Start: 2023-12-21 | End: 2023-12-22 | Stop reason: HOSPADM

## 2023-12-21 RX ORDER — AMOXICILLIN 250 MG
2 CAPSULE ORAL 2 TIMES DAILY
Status: DISCONTINUED | OUTPATIENT
Start: 2023-12-21 | End: 2023-12-22 | Stop reason: HOSPADM

## 2023-12-21 RX ORDER — LORATADINE 10 MG/1
10 TABLET ORAL DAILY
COMMUNITY

## 2023-12-21 RX ORDER — IPRATROPIUM BROMIDE AND ALBUTEROL SULFATE 2.5; .5 MG/3ML; MG/3ML
3 SOLUTION RESPIRATORY (INHALATION)
Status: DISCONTINUED | OUTPATIENT
Start: 2023-12-21 | End: 2023-12-22 | Stop reason: HOSPADM

## 2023-12-21 RX ORDER — ACETAMINOPHEN 325 MG/1
650 TABLET ORAL EVERY 6 HOURS PRN
Status: DISCONTINUED | OUTPATIENT
Start: 2023-12-21 | End: 2023-12-22 | Stop reason: HOSPADM

## 2023-12-21 RX ORDER — SODIUM CHLORIDE, SODIUM LACTATE, POTASSIUM CHLORIDE, AND CALCIUM CHLORIDE .6; .31; .03; .02 G/100ML; G/100ML; G/100ML; G/100ML
1000 INJECTION, SOLUTION INTRAVENOUS ONCE
Status: COMPLETED | OUTPATIENT
Start: 2023-12-21 | End: 2023-12-21

## 2023-12-21 RX ORDER — SODIUM CHLORIDE 9 MG/ML
1000 INJECTION, SOLUTION INTRAVENOUS ONCE
Status: COMPLETED | OUTPATIENT
Start: 2023-12-21 | End: 2023-12-21

## 2023-12-21 RX ORDER — PANTOPRAZOLE SODIUM 40 MG/1
40 TABLET, DELAYED RELEASE ORAL DAILY
Status: DISCONTINUED | OUTPATIENT
Start: 2023-12-21 | End: 2023-12-21

## 2023-12-21 RX ORDER — LORAZEPAM 2 MG/1
2 TABLET ORAL
Status: DISCONTINUED | OUTPATIENT
Start: 2023-12-21 | End: 2023-12-22 | Stop reason: HOSPADM

## 2023-12-21 RX ORDER — SODIUM CHLORIDE 9 MG/ML
INJECTION, SOLUTION INTRAVENOUS CONTINUOUS
Status: DISCONTINUED | OUTPATIENT
Start: 2023-12-21 | End: 2023-12-21

## 2023-12-21 RX ORDER — FLUTICASONE PROPIONATE 50 MCG
1 SPRAY, SUSPENSION (ML) NASAL DAILY
Status: DISCONTINUED | OUTPATIENT
Start: 2023-12-21 | End: 2023-12-21

## 2023-12-21 RX ORDER — MELOXICAM 7.5 MG/1
7.5 TABLET ORAL DAILY
Status: ON HOLD | COMMUNITY
End: 2023-12-22

## 2023-12-21 RX ORDER — GAUZE BANDAGE 2" X 2"
100 BANDAGE TOPICAL DAILY
Status: DISCONTINUED | OUTPATIENT
Start: 2023-12-21 | End: 2023-12-22 | Stop reason: HOSPADM

## 2023-12-21 RX ORDER — LORAZEPAM 1 MG/1
0.5 TABLET ORAL EVERY 4 HOURS PRN
Status: DISCONTINUED | OUTPATIENT
Start: 2023-12-21 | End: 2023-12-22 | Stop reason: HOSPADM

## 2023-12-21 RX ORDER — POTASSIUM CHLORIDE 20 MEQ/1
20 TABLET, EXTENDED RELEASE ORAL
Status: DISCONTINUED | OUTPATIENT
Start: 2023-12-21 | End: 2023-12-21

## 2023-12-21 RX ADMIN — SIMVASTATIN 10 MG: 20 TABLET, FILM COATED ORAL at 17:33

## 2023-12-21 RX ADMIN — FOLIC ACID 1 MG: 1 TABLET ORAL at 06:32

## 2023-12-21 RX ADMIN — PREDNISONE 40 MG: 20 TABLET ORAL at 10:18

## 2023-12-21 RX ADMIN — POTASSIUM CHLORIDE 40 MEQ: 1500 TABLET, EXTENDED RELEASE ORAL at 06:31

## 2023-12-21 RX ADMIN — SODIUM CHLORIDE 1000 ML: 9 INJECTION, SOLUTION INTRAVENOUS at 03:37

## 2023-12-21 RX ADMIN — IPRATROPIUM BROMIDE AND ALBUTEROL SULFATE 3 ML: 2.5; .5 SOLUTION RESPIRATORY (INHALATION) at 03:43

## 2023-12-21 RX ADMIN — SODIUM CHLORIDE, POTASSIUM CHLORIDE, SODIUM LACTATE AND CALCIUM CHLORIDE 1000 ML: 600; 310; 30; 20 INJECTION, SOLUTION INTRAVENOUS at 08:00

## 2023-12-21 RX ADMIN — Medication 100 MG: at 06:32

## 2023-12-21 RX ADMIN — THERA TABS 1 TABLET: TAB at 06:32

## 2023-12-21 RX ADMIN — ENOXAPARIN SODIUM 40 MG: 100 INJECTION SUBCUTANEOUS at 17:33

## 2023-12-21 RX ADMIN — SODIUM CHLORIDE: 9 INJECTION, SOLUTION INTRAVENOUS at 06:32

## 2023-12-21 RX ADMIN — SODIUM CHLORIDE, POTASSIUM CHLORIDE, SODIUM LACTATE AND CALCIUM CHLORIDE 1000 ML: 600; 310; 30; 20 INJECTION, SOLUTION INTRAVENOUS at 15:09

## 2023-12-21 RX ADMIN — DOCUSATE SODIUM 50 MG AND SENNOSIDES 8.6 MG 2 TABLET: 8.6; 5 TABLET, FILM COATED ORAL at 06:31

## 2023-12-21 ASSESSMENT — ENCOUNTER SYMPTOMS
COUGH: 0
MYALGIAS: 1
WEAKNESS: 1
FEVER: 0
CHILLS: 0
NAUSEA: 0
DIARRHEA: 0
NERVOUS/ANXIOUS: 1
NAUSEA: 1
VOMITING: 0
ABDOMINAL PAIN: 0
LOSS OF CONSCIOUSNESS: 1
SHORTNESS OF BREATH: 0

## 2023-12-21 ASSESSMENT — COGNITIVE AND FUNCTIONAL STATUS - GENERAL
SUGGESTED CMS G CODE MODIFIER DAILY ACTIVITY: CH
MOBILITY SCORE: 22
DAILY ACTIVITIY SCORE: 24
CLIMB 3 TO 5 STEPS WITH RAILING: A LOT
SUGGESTED CMS G CODE MODIFIER MOBILITY: CJ

## 2023-12-21 ASSESSMENT — FIBROSIS 4 INDEX
FIB4 SCORE: 2.5
FIB4 SCORE: 2.5
FIB4 SCORE: 2.93

## 2023-12-21 ASSESSMENT — LIFESTYLE VARIABLES
HAVE YOU EVER FELT YOU SHOULD CUT DOWN ON YOUR DRINKING: NO
ORIENTATION AND CLOUDING OF SENSORIUM: ORIENTED AND CAN DO SERIAL ADDITIONS
TOTAL SCORE: 0
EVER HAD A DRINK FIRST THING IN THE MORNING TO STEADY YOUR NERVES TO GET RID OF A HANGOVER: NO
HAVE PEOPLE ANNOYED YOU BY CRITICIZING YOUR DRINKING: NO
DO YOU DRINK ALCOHOL: YES
HEADACHE, FULLNESS IN HEAD: NOT PRESENT
HOW MANY TIMES IN THE PAST YEAR HAVE YOU HAD 5 OR MORE DRINKS IN A DAY: 0
ANXIETY: NO ANXIETY (AT EASE)
DOES PATIENT WANT TO STOP DRINKING: NO
PAROXYSMAL SWEATS: NO SWEAT VISIBLE
HAVE PEOPLE ANNOYED YOU BY CRITICIZING YOUR DRINKING: NO
EVER HAD A DRINK FIRST THING IN THE MORNING TO STEADY YOUR NERVES TO GET RID OF A HANGOVER: NO
HEADACHE, FULLNESS IN HEAD: NOT PRESENT
TOTAL SCORE: 0
AUDITORY DISTURBANCES: NOT PRESENT
TOTAL SCORE: 0
ON A TYPICAL DAY WHEN YOU DRINK ALCOHOL HOW MANY DRINKS DO YOU HAVE: 2
DOES PATIENT WANT TO STOP DRINKING: NO
TOTAL SCORE: 0
ANXIETY: MILDLY ANXIOUS
EVER FELT BAD OR GUILTY ABOUT YOUR DRINKING: NO
ALCOHOL_USE: YES
VISUAL DISTURBANCES: NOT PRESENT
AUDITORY DISTURBANCES: NOT PRESENT
TOTAL SCORE: 0
HAVE YOU EVER FELT YOU SHOULD CUT DOWN ON YOUR DRINKING: NO
HOW MANY TIMES IN THE PAST YEAR HAVE YOU HAD 5 OR MORE DRINKS IN A DAY: 0
ON A TYPICAL DAY WHEN YOU DRINK ALCOHOL HOW MANY DRINKS DO YOU HAVE: 2
ORIENTATION AND CLOUDING OF SENSORIUM: ORIENTED AND CAN DO SERIAL ADDITIONS
VISUAL DISTURBANCES: NOT PRESENT
TOTAL SCORE: 0
AGITATION: NORMAL ACTIVITY
TREMOR: NO TREMOR
NAUSEA AND VOMITING: NO NAUSEA AND NO VOMITING
AVERAGE NUMBER OF DAYS PER WEEK YOU HAVE A DRINK CONTAINING ALCOHOL: 4
PAROXYSMAL SWEATS: NO SWEAT VISIBLE
TOTAL SCORE: 1
AGITATION: NORMAL ACTIVITY
EVER FELT BAD OR GUILTY ABOUT YOUR DRINKING: NO
TOTAL SCORE: 0
TREMOR: NO TREMOR
CONSUMPTION TOTAL: POSITIVE
NAUSEA AND VOMITING: NO NAUSEA AND NO VOMITING
AVERAGE NUMBER OF DAYS PER WEEK YOU HAVE A DRINK CONTAINING ALCOHOL: 4
CONSUMPTION TOTAL: POSITIVE

## 2023-12-21 ASSESSMENT — COPD QUESTIONNAIRES
HAVE YOU SMOKED AT LEAST 100 CIGARETTES IN YOUR ENTIRE LIFE: YES
DO YOU EVER COUGH UP ANY MUCUS OR PHLEGM?: YES, A FEW DAYS A WEEK OR MONTH
COPD SCREENING SCORE: 7
DURING THE PAST 4 WEEKS HOW MUCH DID YOU FEEL SHORT OF BREATH: SOME OF THE TIME

## 2023-12-21 ASSESSMENT — PATIENT HEALTH QUESTIONNAIRE - PHQ9
7. TROUBLE CONCENTRATING ON THINGS, SUCH AS READING THE NEWSPAPER OR WATCHING TELEVISION: NOT AT ALL
4. FEELING TIRED OR HAVING LITTLE ENERGY: SEVERAL DAYS
5. POOR APPETITE OR OVEREATING: NOT AT ALL
6. FEELING BAD ABOUT YOURSELF - OR THAT YOU ARE A FAILURE OR HAVE LET YOURSELF OR YOUR FAMILY DOWN: NOT AL ALL
3. TROUBLE FALLING OR STAYING ASLEEP OR SLEEPING TOO MUCH: NOT AT ALL
3. TROUBLE FALLING OR STAYING ASLEEP OR SLEEPING TOO MUCH: NOT AT ALL
2. FEELING DOWN, DEPRESSED, IRRITABLE, OR HOPELESS: SEVERAL DAYS
4. FEELING TIRED OR HAVING LITTLE ENERGY: SEVERAL DAYS
SUM OF ALL RESPONSES TO PHQ QUESTIONS 1-9: 2
SUM OF ALL RESPONSES TO PHQ QUESTIONS 1-9: 2
6. FEELING BAD ABOUT YOURSELF - OR THAT YOU ARE A FAILURE OR HAVE LET YOURSELF OR YOUR FAMILY DOWN: NOT AL ALL
1. LITTLE INTEREST OR PLEASURE IN DOING THINGS: NOT AT ALL
1. LITTLE INTEREST OR PLEASURE IN DOING THINGS: NOT AT ALL
SUM OF ALL RESPONSES TO PHQ9 QUESTIONS 1 AND 2: 1
9. THOUGHTS THAT YOU WOULD BE BETTER OFF DEAD, OR OF HURTING YOURSELF: NOT AT ALL
2. FEELING DOWN, DEPRESSED, IRRITABLE, OR HOPELESS: SEVERAL DAYS
SUM OF ALL RESPONSES TO PHQ9 QUESTIONS 1 AND 2: 1
8. MOVING OR SPEAKING SO SLOWLY THAT OTHER PEOPLE COULD HAVE NOTICED. OR THE OPPOSITE, BEING SO FIGETY OR RESTLESS THAT YOU HAVE BEEN MOVING AROUND A LOT MORE THAN USUAL: NOT AT ALL
8. MOVING OR SPEAKING SO SLOWLY THAT OTHER PEOPLE COULD HAVE NOTICED. OR THE OPPOSITE, BEING SO FIGETY OR RESTLESS THAT YOU HAVE BEEN MOVING AROUND A LOT MORE THAN USUAL: NOT AT ALL
7. TROUBLE CONCENTRATING ON THINGS, SUCH AS READING THE NEWSPAPER OR WATCHING TELEVISION: NOT AT ALL
5. POOR APPETITE OR OVEREATING: NOT AT ALL
9. THOUGHTS THAT YOU WOULD BE BETTER OFF DEAD, OR OF HURTING YOURSELF: NOT AT ALL

## 2023-12-21 NOTE — ASSESSMENT & PLAN NOTE
Patient presented in the ER with chief complaint of syncope associated with lightheadedness, denies nausea, warmth, sweating.  Patient stated that she had 3 fall episode in last 24-month due to syncope.  Echo on this admission was reassuring with EF 73%.  No valvular abnormality.  Patient stated that she used to have 3 drink every day.  Her last drink was on 12/21/2023 on this admission.  Syncope likely due to dehydration.     -discontinue IV fluid  -Negative orthostatic vital sign  -Echo reassuring with EF 73%  -dc tele  -fall precautions

## 2023-12-21 NOTE — PROGRESS NOTES
"[facility] Medicine Daily Progress Note    Date of Service  12/21/2023    Chief Complaint  Lily Lloyd is a 88 y.o. female admitted 12/21/2023 with   Chief Complaint   Patient presents with    Syncope    Hypotension     Patient BIBA from the [facility]. Patient was reported to be \"out of it\" then falling to the ground. Patient was initially A&Ox1 on scene for EMS. Patient is now A&Ox3. . Patient was hypotensive and braycardic on scene with a pressure of 40/--, then a LR bolus was initiated.         Hospital Course  This 88-year-old female with a past medical history significant for dependence, COPD, hypertension, dyslipidemia, CKD stage III, GERD, history of alcohol abuse presented to the ER on 12/21/2023 with a syncopal episode.    Open presentation in ER, she was noted to be hypotensive in 80s, altered but noted improvement with fluid.  She is also noted to be bradycardic with heart rate 40s, blood glucose at 124.    Workup in ER .9 BNP 79, anion gap 16 sodium 123 lactic acid 4.7.CT head showed no acute intracranial abnormality; calcified mass within the right anterior falx; x-ray showing interstitial pulmonary pain, prominence of the chest chronic underlying lung disease.    Of subdural hematoma 11.7 mm status post left bur hole evacuation of hematoma on 4/2023    Interval events:  -- Patient was admitted today, initially patient is noted to be tachypneic, currently patient vital sign has been stable.  --na be at 123 on admission, BNP low at 79; likely hypovolemic hyponatremia, continue IV fluid  --Sodium 1 4 4/2023 is noted to be 132  --Blood alcohol about 257.29, patient has history of heavy alcohol use, will start the patient on CIWA protocol, continue aspiration, fall, seizure precaution.    - Discussed the code status and she stated that she would like to be full code     I have discussed this patient's plan of care and discharge plan at IDT rounds today with Case Management, Nursing, Nursing " leadership, and other members of the IDT team.    Consultants/Specialty  None    Code Status  Full Code    Disposition  The patient is not medically cleared for discharge to home or a post-acute facility.  Anticipate discharge to: home with organized home healthcare and close outpatient follow-up    I have placed the appropriate orders for post-discharge needs.    Review of Systems  Review of Systems   Constitutional:  Positive for malaise/fatigue.   HENT:  Negative for congestion.    Gastrointestinal:  Positive for nausea. Negative for abdominal pain.   Musculoskeletal:  Positive for myalgias.   Neurological:  Positive for weakness.   Psychiatric/Behavioral:  The patient is nervous/anxious.         Physical Exam  Temp:  [36.1 °C (96.9 °F)] 36.1 °C (96.9 °F)  Pulse:  [65-86] 86  Resp:  [12-28] 12  BP: (102-137)/(51-60) 137/60  SpO2:  [90 %-96 %] 91 %    Physical Exam  Vitals and nursing note reviewed.   Constitutional:       Appearance: Normal appearance.   HENT:      Head: Normocephalic and atraumatic.   Eyes:      Extraocular Movements: Extraocular movements intact.   Cardiovascular:      Rate and Rhythm: Normal rate.   Pulmonary:      Breath sounds: Normal breath sounds. No wheezing.   Abdominal:      General: There is no distension.      Palpations: Abdomen is soft.   Musculoskeletal:      Cervical back: Neck supple.      Right lower leg: Edema present.      Left lower leg: Edema present.   Neurological:      Mental Status: She is alert and oriented to person, place, and time.      Cranial Nerves: No cranial nerve deficit.      Motor: No weakness.         Fluids    Intake/Output Summary (Last 24 hours) at 12/21/2023 1054  Last data filed at 12/21/2023 0447  Gross per 24 hour   Intake 1000 ml   Output --   Net 1000 ml       Laboratory  Recent Labs     12/21/23  0310   WBC 6.3   RBC 4.22   HEMOGLOBIN 11.5*   HEMATOCRIT 34.1*   MCV 80.8*   MCH 27.3   MCHC 33.7   RDW 45.0   PLATELETCT 241   MPV 10.0     Recent Labs      12/21/23  0310   SODIUM 123*   POTASSIUM 3.4*   CHLORIDE 88*   CO2 19*   GLUCOSE 87   BUN 18   CREATININE 1.16   CALCIUM 8.4*                   Imaging  CT-HEAD W/O   Final Result         1.  No acute intracranial abnormality is identified, there are nonspecific white matter changes, commonly associated with small vessel ischemic disease.  Associated mild cerebral atrophy is noted.   2.  Calcified mass abutting the right anterior falx, appearance most typical of meningioma.   3.  Bilateral maxillary and ethmoid sinusitis changes   4.  Atherosclerosis.         DX-CHEST-PORTABLE (1 VIEW)   Final Result         1.  Interstitial pulmonary parenchymal prominence suggest chronic underlying lung disease, component of interstitial edema and/or infiltrates not excluded.   2.  Round retrocardiac opacity, appearance favoring hiatal hernia   3.  Cardiomegaly   4.  Atherosclerosis      EC-ECHOCARDIOGRAM COMPLETE W/O CONT    (Results Pending)        Assessment/Plan  * Hyponatremia- (present on admission)  Assessment & Plan  Sodium presentation 123, comes with syncope.  She has been drinking a lot tonight and drinks a lot in general but not every day  Likely related to dehydration and alcohol use  IV fluids.  BMP ordered every 6 to continue monitoring and avoid overcorrection    Meningioma (HCC)- (present on admission)  Assessment & Plan  Incidental finding on CT scan of the head    Alcoholic ketoacidosis- (present on admission)  Assessment & Plan  On presentation bicarb 18, anion gap 16, lactic acid 4.7.  IV fluids, trend lactic acid    Syncope and collapse- (present on admission)  Assessment & Plan  Syncopal episode metoprolol tonight, but she has been drinking a lot all day.  Likely related to alcohol abuse  She was hypotensive on EMS arrival.  Given IV fluids, given 1 L fluid bolus in the ED  Echocardiogram ordered, monitor on telemetry    Hypokalemia- (present on admission)  Assessment & Plan  3.4 presentation, likely due  to furosemide therapy.  Replete as needed and check magnesium levels.  BMP ordered to continue monitoring    Alcohol abuse- (present on admission)  Assessment & Plan  Denies drinking daily.  Not in withdrawal at this time, start CIWA if necessary  Thiamine, folate, troponin    Dyslipidemia- (present on admission)  Assessment & Plan  Continue statin    Stage 3a chronic kidney disease (HCC)- (present on admission)  Assessment & Plan  At baseline, avoid nephrotoxic agents    Tobacco abuse- (present on admission)  Assessment & Plan  Patient smokes 5 to 6 cigarettes daily.  Nicotine patch and/or gum declined.   I discussed cessation with patient including starting on nicotine patch and/or gum on discharge.  I also discussed medications to help with cessation with patient including Wellbutrin and Chantix, willing to try Wellbutrin, on discharge.  Smoking cessation discussed with patient for 4 minutes.    GERD (gastroesophageal reflux disease)- (present on admission)  Assessment & Plan  Continue PPI    Essential hypertension- (present on admission)  Assessment & Plan  Hold lisinopril, metoprolol, furosemide due to presentation with syncope and hypotensive in the field         VTE prophylaxis: Lovenox

## 2023-12-21 NOTE — ED NOTES
Med rec completed per pt's home pharmacy ([facility])     Per [facility] pt has not filled Lisinopril 10 mg daily since 7/7/2022

## 2023-12-21 NOTE — PROGRESS NOTES
Pt admitted to T. VSS. On tele. Up SBA. Bed alarm on, waffle on. Call light within reach, bed locked and in low position, gripper socks on. Padded side rails and suction at bedside.

## 2023-12-21 NOTE — H&P
"Hospital Medicine History & Physical Note    Date of Service  12/21/2023    Primary Care Physician  Orlin Chacon M.D.    Code Status  Full Code    Chief Complaint  Chief Complaint   Patient presents with    Syncope    Hypotension     Patient BIBA from the [facility]. Patient was reported to be \"out of it\" then falling to the ground. Patient was initially A&Ox1 on scene for EMS. Patient is now A&Ox3. . Patient was hypotensive and braycardic on scene with a pressure of 40/--, then a LR bolus was initiated.       History of Presenting Illness  Lily Lloyd is a 88 y.o. female who presented 12/21/2023 with syncope.  PMH of hypertension, dyslipidemia, CKD 3, GERD.  Reportedly patient had a syncopal event while at the [facility].  Upon EMS arrival her blood pressure was low in the 80s and she was altered but improved after fluids, she was also bradycardic in the 40s.  Glucose on arrival of .  Patient does not remember the event, she says she does have a history of syncopal episodes in the past.  She has been drinking a lot tonight, says she does not usually drink every day.  At this time denies any symptoms, afebrile, hemodynamic stable, no chest pain, no palpitations or other acute complaints.  And has been in normal state of health.    While in the ED afebrile, hemodynamically stable, not bradycardic.  Labs showed sodium 123, calcium 2.4, lactic acid 4.7.    I discussed the plan of care with patient, bedside RN, and EDP .    Review of Systems  Review of Systems   Constitutional:  Negative for chills and fever.   Respiratory:  Negative for cough and shortness of breath.    Cardiovascular:  Negative for chest pain.   Gastrointestinal:  Negative for abdominal pain, diarrhea, nausea and vomiting.   Genitourinary:  Negative for dysuria and urgency.   Neurological:  Positive for loss of consciousness.       Past Medical History   has a past medical history of Arthritis, Breath shortness, CATARACT, " Diabetes, Heart burn, Hiatus hernia syndrome, Hypertension, Indigestion, Pain, Renal disorder, and Snoring.    Surgical History   has a past surgical history that includes nasal polypectomy (8/19/2010); ethmoidectomy (8/19/2010); antrostomy (8/19/2010); knee arthroplasty total; appendectomy; sinusotomies (12/20/2012); nasal polypectomy (12/20/2012); abdominal hysterectomy total; oophorectomy; cataract phaco with iol (8/6/2013); cataract phaco with iol (8/20/2013); and stevie holes (Left, 4/3/2023).     Family History  family history includes Cancer in her paternal aunt, sister, and sister; Diabetes in her father.   Family history reviewed with patient. There is no family history that is pertinent to the chief complaint.     Social History   reports that she has been smoking cigarettes. She has a 25.0 pack-year smoking history. She uses smokeless tobacco. She reports current alcohol use of about 1.0 oz of alcohol per week. She reports that she does not use drugs.    Allergies  Allergies   Allergen Reactions    Pcn [Penicillins] Rash and Swelling    Sulfa Drugs Hives and Itching    Tape        Medications  Prior to Admission Medications   Prescriptions Last Dose Informant Patient Reported? Taking?   Cholecalciferol (D 2000) 2000 UNIT Tab  Patient Yes No   Sig: Take 2,000 Units by mouth every day.   acetaminophen (TYLENOL) 325 MG Tab   No No   Sig: Take 2 Tablets by mouth every 6 hours as needed for Moderate Pain or Mild Pain.   cyanocobalamin (VITAMIN B-12) 100 MCG Tab  Patient Yes No   Sig: Take 100 mcg by mouth every day.   furosemide (LASIX) 40 MG Tab  Patient Yes No   Sig: Take 40 mg by mouth every day.   lisinopril (PRINIVIL) 10 MG Tab  Patient Yes No   Sig: Take 10 mg by mouth every day.   metoprolol SR (TOPROL XL) 50 MG TABLET SR 24 HR   No No   Sig: Take 1 Tablet by mouth every day.   multivitamin Tab  Patient Yes No   Sig: Take 1 Tablet by mouth every day.   pantoprazole (PROTONIX) 40 MG Tablet Delayed Response    No No   Sig: Take 1 Tablet by mouth every day.   potassium chloride SA (KDUR) 20 MEQ Tab CR   No No   Sig: Take 1 Tablet by mouth every day.   psyllium (METAMUCIL) 58.12 % Pack  Patient Yes No   Sig: Take 1 Packet by mouth every day.   simvastatin (ZOCOR) 10 MG Tab  Patient Yes No   Sig: Take 10 mg by mouth every evening.      Facility-Administered Medications: None       Physical Exam  Temp:  [36.1 °C (96.9 °F)] 36.1 °C (96.9 °F)  Pulse:  [65-80] 80  Resp:  [14-28] 14  BP: (102-122)/(51-60) 108/51  SpO2:  [94 %-96 %] 94 %  Blood Pressure : 108/51   Temperature: 36.1 °C (96.9 °F)   Pulse: 80   Respiration: 14   Pulse Oximetry: 94 %       Physical Exam  Constitutional:       Appearance: Normal appearance.   HENT:      Head: Normocephalic and atraumatic.      Mouth/Throat:      Mouth: Mucous membranes are moist.      Pharynx: No oropharyngeal exudate or posterior oropharyngeal erythema.   Cardiovascular:      Rate and Rhythm: Normal rate and regular rhythm.      Pulses: Normal pulses.      Heart sounds: Normal heart sounds. No murmur heard.  Pulmonary:      Effort: Pulmonary effort is normal. No respiratory distress.      Breath sounds: Normal breath sounds.   Musculoskeletal:         General: No swelling or tenderness. Normal range of motion.   Skin:     General: Skin is warm and dry.   Neurological:      General: No focal deficit present.      Mental Status: She is alert and oriented to person, place, and time.   Psychiatric:         Mood and Affect: Mood normal.         Laboratory:  Recent Labs     12/21/23 0310   WBC 6.3   RBC 4.22   HEMOGLOBIN 11.5*   HEMATOCRIT 34.1*   MCV 80.8*   MCH 27.3   MCHC 33.7   RDW 45.0   PLATELETCT 241   MPV 10.0     Recent Labs     12/21/23 0310   SODIUM 123*   POTASSIUM 3.4*   CHLORIDE 88*   CO2 19*   GLUCOSE 87   BUN 18   CREATININE 1.16   CALCIUM 8.4*     Recent Labs     12/21/23  0310   ALTSGPT 12   ASTSGOT 24   ALKPHOSPHAT 63   TBILIRUBIN 0.2   GLUCOSE 87         Recent Labs      12/21/23  0310   NTPROBNP 79         Recent Labs     12/21/23  0310   TROPONINT 13       Imaging:  CT-HEAD W/O   Final Result         1.  No acute intracranial abnormality is identified, there are nonspecific white matter changes, commonly associated with small vessel ischemic disease.  Associated mild cerebral atrophy is noted.   2.  Calcified mass abutting the right anterior falx, appearance most typical of meningioma.   3.  Bilateral maxillary and ethmoid sinusitis changes   4.  Atherosclerosis.         DX-CHEST-PORTABLE (1 VIEW)   Final Result         1.  Interstitial pulmonary parenchymal prominence suggest chronic underlying lung disease, component of interstitial edema and/or infiltrates not excluded.   2.  Round retrocardiac opacity, appearance favoring hiatal hernia   3.  Cardiomegaly   4.  Atherosclerosis      EC-ECHOCARDIOGRAM COMPLETE W/O CONT    (Results Pending)       X-Ray:  I have personally reviewed the images and compared with prior images. and My impression is: Interstitia Parenchymal prominence, chronic disease versus edema  EKG:  I have personally reviewed the images and compared with prior images. and My impression is: NSR    Assessment/Plan:  Justification for Admission Status  I anticipate this patient will require at least two midnights for appropriate medical management, necessitating inpatient admission because hyponatremia and syncope  * Hyponatremia- (present on admission)  Assessment & Plan  Sodium presentation 123, comes with syncope.  She has been drinking a lot tonight and drinks a lot in general but not every day  Likely related to dehydration and alcohol use  IV fluids.  BMP ordered every 6 to continue monitoring and avoid overcorrection    Syncope and collapse- (present on admission)  Assessment & Plan  Syncopal episode metoprolol tonight, but she has been drinking a lot all day.  Likely related to alcohol abuse  She was hypotensive on EMS arrival.  Given IV fluids, given 1 L  fluid bolus in the ED  Echocardiogram ordered, monitor on telemetry    Alcoholic ketoacidosis- (present on admission)  Assessment & Plan  On presentation bicarb 18, anion gap 16, lactic acid 4.7.  IV fluids, trend lactic acid    Alcohol abuse- (present on admission)  Assessment & Plan  Denies drinking daily.  Not in withdrawal at this time, start CIWA if necessary  Thiamine, folate, troponin    Essential hypertension- (present on admission)  Assessment & Plan  Hold lisinopril, metoprolol, furosemide due to presentation with syncope and hypotensive in the field    Hypokalemia- (present on admission)  Assessment & Plan  3.4 presentation, likely due to furosemide therapy.  Replete as needed and check magnesium levels.  BMP ordered to continue monitoring    Meningioma (HCC)- (present on admission)  Assessment & Plan  Incidental finding on CT scan of the head    Dyslipidemia- (present on admission)  Assessment & Plan  Continue statin    Stage 3a chronic kidney disease (HCC)- (present on admission)  Assessment & Plan  At baseline, avoid nephrotoxic agents    Tobacco abuse- (present on admission)  Assessment & Plan  Patient smokes 5 to 6 cigarettes daily.  Nicotine patch and/or gum declined.   I discussed cessation with patient including starting on nicotine patch and/or gum on discharge.  I also discussed medications to help with cessation with patient including Wellbutrin and Chantix, willing to try Wellbutrin, on discharge.  Smoking cessation discussed with patient for 4 minutes.    GERD (gastroesophageal reflux disease)- (present on admission)  Assessment & Plan  Continue PPI        VTE prophylaxis: enoxaparin ppx

## 2023-12-21 NOTE — ED PROVIDER NOTES
"ED Provider Note    Scribed for Alejandro Acevedo by Abel Husain. 12/21/2023  3:27 AM    Primary care provider: Orlin Chacon M.D.  Means of arrival: EMS  History obtained from: Patient  History limited by: None    CHIEF COMPLAINT  Chief Complaint   Patient presents with    Syncope    Hypotension     Patient BIBA from the [facility]. Patient was reported to be \"out of it\" then falling to the ground. Patient was initially A&Ox1 on scene for EMS. Patient is now A&Ox3. . Patient was hypotensive and braycardic on scene with a pressure of 40/--, then a LR bolus was initiated.     EXTERNAL RECORDS REVIEWED  Inpatient Notes The patient was admitted for 3 days on 4/3/23 for a subdural hematoma requiring a stevie hole drainage.    HPI/ROS  LIMITATION TO HISTORY   Select: : None  OUTSIDE HISTORIAN(S):  EMS provided helpful and collateral history regarding the patient's status upon arrival to the [facility] and her status en route.    HPI  Lily Lloyd is a 88 y.o. female who presents to the Emergency Department via EMS for evaluation of a syncopal episode secondary to hypotension onset prior to arrival. Per EMS, the patient's blood glucose on scene was 124, she was bradycardic, and her systolic blood pressure was 40. An LR bolus was given. The patient reports she does not know why she is in the ED tonight and does not remember the incident. She denies any cough or headache. She reports she is not on oxygen at home and has not been sick recently. She denies any history of COPD or other medical issues and notes she does not take any blood thinners. The patient states she drank 3 or 4 alcoholic beverages tonight, but does not drink regularly. She adds she smokes tobacco.      REVIEW OF SYSTEMS  As above, all other systems reviewed and are negative.   See HPI for further details.     PAST MEDICAL HISTORY   has a past medical history of Arthritis, Breath shortness, CATARACT, Diabetes, Heart burn, Hiatus hernia " syndrome, Hypertension, Indigestion, Pain, Renal disorder, and Snoring.  SURGICAL HISTORY   has a past surgical history that includes nasal polypectomy (8/19/2010); ethmoidectomy (8/19/2010); antrostomy (8/19/2010); knee arthroplasty total; appendectomy; sinusotomies (12/20/2012); nasal polypectomy (12/20/2012); abdominal hysterectomy total; oophorectomy; cataract phaco with iol (8/6/2013); cataract phaco with iol (8/20/2013); and stevie holes (Left, 4/3/2023).  SOCIAL HISTORY  Social History     Tobacco Use    Smoking status: Every Day     Current packs/day: 0.50     Average packs/day: 0.5 packs/day for 50.0 years (25.0 ttl pk-yrs)     Types: Cigarettes    Smokeless tobacco: Current    Tobacco comments:     0.5 PPD X45 YRS   Vaping Use    Vaping Use: Never used   Substance Use Topics    Alcohol use: Yes     Alcohol/week: 1.0 oz     Types: 2 Glasses of wine per week     Comment: socially    Drug use: No      Social History     Substance and Sexual Activity   Drug Use No     FAMILY HISTORY  Family History   Problem Relation Age of Onset    Cancer Sister         breast cancer    Cancer Paternal Aunt         breast cancer    Diabetes Father     Cancer Sister      CURRENT MEDICATIONS  Home Medications       Reviewed by Kevin Francisco R.N. (Registered Nurse) on 12/21/23 at 0309  Med List Status: Not Addressed     Medication Last Dose Status   acetaminophen (TYLENOL) 325 MG Tab  Active   Cholecalciferol (D 2000) 2000 UNIT Tab  Active   cyanocobalamin (VITAMIN B-12) 100 MCG Tab  Active   furosemide (LASIX) 40 MG Tab  Active   lisinopril (PRINIVIL) 10 MG Tab  Active   metoprolol SR (TOPROL XL) 50 MG TABLET SR 24 HR  Active   multivitamin Tab  Active   pantoprazole (PROTONIX) 40 MG Tablet Delayed Response  Active   potassium chloride SA (KDUR) 20 MEQ Tab CR  Active   psyllium (METAMUCIL) 58.12 % Pack  Active   simvastatin (ZOCOR) 10 MG Tab  Active                  ALLERGIES  Allergies   Allergen Reactions    Pcn [Penicillins]  "Rash and Swelling    Sulfa Drugs Hives and Itching    Tape        PHYSICAL EXAM    VITAL SIGNS:   Vitals:    12/21/23 0306 12/21/23 0313 12/21/23 0343   BP:  102/51    Pulse:  65 69   Resp:  (!) 28 (!) 27   Temp:  36.1 °C (96.9 °F)    TempSrc:  Temporal    SpO2:  94% 95%   Weight: 72.6 kg (160 lb)     Height: 1.6 m (5' 3\")       Vitals: My interpretation: normotensive, not tachycardic, afebrile, not hypoxic    Reinterpretation of vitals: Unchanged unremarkable    Cardiac Monitor Interpretation: The cardiac monitor revealed normal Sinus Rhythm as interpreted by me. The cardiac monitor was ordered secondary to the patient's history of syncope and to monitor for dysrhythmia and/or tachycardia.    PE:   Gen: sitting comfortably, speaking clearly, appears in no acute distress   ENT: Mucous membranes moist, posterior pharynx clear, uvula midline, nares patent bilaterally   Neck: Supple, FROM  Pulmonary: Expiratory wheezes in all fields present to auscultation, tachypnic.  CV:  RRR, no murmur appreciated, pulses 2+ in both upper and lower extremities  Abdomen: soft, NT/ND; no rebound/guarding  : no CVA or suprapubic tenderness   Neuro: A&Ox4 (person, place, time, situation), speech fluent, gait steady, no focal deficits appreciated  Skin: No rash or lesions.  No pallor or jaundice.  No cyanosis.  Warm and dry.     DIAGNOSTIC STUDIES / PROCEDURES    LABS  Results for orders placed or performed during the hospital encounter of 12/21/23   CBC WITH DIFFERENTIAL   Result Value Ref Range    WBC 6.3 4.8 - 10.8 K/uL    RBC 4.22 4.20 - 5.40 M/uL    Hemoglobin 11.5 (L) 12.0 - 16.0 g/dL    Hematocrit 34.1 (L) 37.0 - 47.0 %    MCV 80.8 (L) 81.4 - 97.8 fL    MCH 27.3 27.0 - 33.0 pg    MCHC 33.7 32.2 - 35.5 g/dL    RDW 45.0 35.9 - 50.0 fL    Platelet Count 241 164 - 446 K/uL    MPV 10.0 9.0 - 12.9 fL    Neutrophils-Polys 36.90 (L) 44.00 - 72.00 %    Lymphocytes 52.60 (H) 22.00 - 41.00 %    Monocytes 5.10 0.00 - 13.40 %    Eosinophils " 4.60 0.00 - 6.90 %    Basophils 0.60 0.00 - 1.80 %    Immature Granulocytes 0.20 0.00 - 0.90 %    Nucleated RBC 0.00 0.00 - 0.20 /100 WBC    Neutrophils (Absolute) 2.30 1.82 - 7.42 K/uL    Lymphs (Absolute) 3.29 1.00 - 4.80 K/uL    Monos (Absolute) 0.32 0.00 - 0.85 K/uL    Eos (Absolute) 0.29 0.00 - 0.51 K/uL    Baso (Absolute) 0.04 0.00 - 0.12 K/uL    Immature Granulocytes (abs) 0.01 0.00 - 0.11 K/uL    NRBC (Absolute) 0.00 K/uL   COMP METABOLIC PANEL   Result Value Ref Range    Sodium 123 (L) 135 - 145 mmol/L    Potassium 3.4 (L) 3.6 - 5.5 mmol/L    Chloride 88 (L) 96 - 112 mmol/L    Co2 19 (L) 20 - 33 mmol/L    Anion Gap 16.0 7.0 - 16.0    Glucose 87 65 - 99 mg/dL    Bun 18 8 - 22 mg/dL    Creatinine 1.16 0.50 - 1.40 mg/dL    Calcium 8.4 (L) 8.5 - 10.5 mg/dL    Correct Calcium 8.6 8.5 - 10.5 mg/dL    AST(SGOT) 24 12 - 45 U/L    ALT(SGPT) 12 2 - 50 U/L    Alkaline Phosphatase 63 30 - 99 U/L    Total Bilirubin 0.2 0.1 - 1.5 mg/dL    Albumin 3.8 3.2 - 4.9 g/dL    Total Protein 5.8 (L) 6.0 - 8.2 g/dL    Globulin 2.0 1.9 - 3.5 g/dL    A-G Ratio 1.9 g/dL   TROPONIN   Result Value Ref Range    Troponin T 13 6 - 19 ng/L   proBrain Natriuretic Peptide, NT   Result Value Ref Range    NT-proBNP 79 0 - 125 pg/mL   LACTIC ACID   Result Value Ref Range    Lactic Acid 4.7 (HH) 0.5 - 2.0 mmol/L   DIAGNOSTIC ALCOHOL   Result Value Ref Range    Diagnostic Alcohol 257.9 (H) <10.1 mg/dL   ESTIMATED GFR   Result Value Ref Range    GFR (CKD-EPI) 45 (A) >60 mL/min/1.73 m 2   EKG   Result Value Ref Range    Report       [facility] Emergency Dept.    Test Date:  2023  Pt Name:    ZULEIMA COLE                  Department: ER  MRN:        [id number]                      Room:        14  Gender:     Female                       Technician: [id number]  :        1935                   Requested By:ER TRIAGE PROTOCOL  Order #:    [id number]                    Reading MD: Alejandro Acevedo    Measurements  Intervals                                 Axis  Rate:       63                           P:          62  WA:         0                            QRS:        79  QRSD:       104                          T:          84  QT:         544  QTc:        558    Interpretive Statements  SINUS RHYTHM  Borderline T abnormalities, anterior leads  Prolonged QT interval  Compared to ECG 2022 00:46:43  T-wave abnormality now present  Sinus rhythm no longer present  Electronically Signed On 2023 03:34:19 PST by Kaitlin Villegas     EKG (NOW)   Result Value Ref Range    Report       [facility] Emergency Dept.    Test Date:  2023  Pt Name:    ZULEIMA COLE                  Department: ER  MRN:        [id number]                      Room:        14  Gender:     Female                       Technician: [id number]  :        [id number]                   Requested By:KAITLIN VILLEGAS  Order #:    [id number]                    Reading MD: Kaitlin Villegas    Measurements  Intervals                                Axis  Rate:       65                           P:          99  WA:         199                          QRS:        82  QRSD:       119                          T:          62  QT:         494  QTc:        514    Interpretive Statements  Sinus rhythm  Nonspecific intraventricular conduction delay  Borderline T abnormalities, anterior leads  Compared to ECG 2023 03:13:35  Intraventricular conduction delay now present  Prolonged QT interval no longer present  T-wave abnormality still present  Electronically Signed On 2023 03:34:2 3 PST by Kaitlin Villegas        All labs reviewed by me. Labs were compared to prior labs if they were available. Significant for no leukocytosis, borderline anemia, severe hyponatremia otherwise fairly unremarkable electrolytes, normal glucose, normal renal function, normal liver enzymes, normal bilirubin, troponin negative, BNP normal, alcohol extremely elevated at  257.  Lactic acid 4.7.    RADIOLOGY  I have independently interpreted the diagnostic imaging associated with this visit and am waiting the final reading from the radiologist.   My preliminary interpretation is a follows: Cardiomegaly with no focal infiltrates  Radiologist interpretation is as follows:  CT-HEAD W/O   Final Result         1.  No acute intracranial abnormality is identified, there are nonspecific white matter changes, commonly associated with small vessel ischemic disease.  Associated mild cerebral atrophy is noted.   2.  Calcified mass abutting the right anterior falx, appearance most typical of meningioma.   3.  Bilateral maxillary and ethmoid sinusitis changes   4.  Atherosclerosis.         DX-CHEST-PORTABLE (1 VIEW)   Final Result         1.  Interstitial pulmonary parenchymal prominence suggest chronic underlying lung disease, component of interstitial edema and/or infiltrates not excluded.   2.  Round retrocardiac opacity, appearance favoring hiatal hernia   3.  Cardiomegaly   4.  Atherosclerosis        COURSE & MEDICAL DECISION MAKING  Nursing notes, VS, PMSFHx, labs, imaging, EKG reviewed in chart.    Heart Score: Low     ED Observation Status? No; Patient does not meet criteria for ED Observation.     Ddx: PE, MI, intoxication, hypotension, arrhythmia, dysrhythmia, hypoglycemia    MDM: 3:27 AM Lily Lloyd is a 88 y.o. female who presented with evaluation by EMS after episode of syncope at the [facility].  Patient has a heavy alcohol use history.  She does not remember any of the events but EMS thankfully provided helpful collateral formation.  Noted to be bradycardic in the 40s and blood pressure 60/36 per EMS report.  Patient initially completely disoriented however improved by time she arrived to the ED.  No seizure-like activity.  No blood thinners.  No signs of trauma or injury.  Patient states he has not been sick recently and has no complaints at this time.  Vital signs upon arrival  here shows she is tachypneic and borderline hypotensive but otherwise are unremarkable.  Stat EKG initially shows no ischemic changes and repeat is unchanged.  Chest x-ray shows findings suggestive of chronic lung disease, patient is a heavy cigarette smoker.  Her exam shows tachypnea and wheezing throughout her entirety of her lungs, no formal diagnosis of COPD and she is not oxygen dependent.  Started on duo nebulizer treatment here in the ED.  Initiated further workup here including CT of the head and blood work.  All labs reviewed by me. Labs were compared to prior labs if they were available. Significant for no leukocytosis, borderline anemia, severe hyponatremia otherwise fairly unremarkable electrolytes, normal glucose, normal renal function, normal liver enzymes, normal bilirubin, troponin negative, BNP normal, alcohol extremely elevated at 257.  Lactic acidosis of 4.7.  CT scan of the head on my depend interpretation shows no signs of traumatic brain injury or intracranial hemorrhage.  I think patient would benefit from inpatient admission, further workup, treatment of her hyponatremia, and monitoring considering she is a high risk syncope patient.  Patient resting comfortably time admission verbalized understanding plan for admission process.  Discussed the hospitalist and they are amenable.    CRITICAL CARE TIME 38 minutes: Acute lactic acidosis of 4.7, syncope, hypotension, bradycardia, requiring frequent reevaluations and admission to the hospital  There was a very real possibility of deterioration of the patient's condition.  This patient required the highest level of care.  I provided critical care services which included: review of the medical record, treatment orders, ordering and reviewing test results, frequent reevaluation of the patient's condition and response to treatment, as well as discussing the case with appropriate personnel and various consultants. The critical care time associated with  the care of this patient is exclusive of any procedures or specific interventions.    ADDITIONAL PROBLEM LIST AND DISPOSITION    I have discussed management of the patient with the following physicians and ARIAN's: Hospitalist    Discussion of management with other [facility] or appropriate source(s): None     Barriers to care at this time, including but not limited to:  None .     Decision tools and prescription drugs considered including, but not limited to: HEART Score low .    FINAL IMPRESSION  1. Syncope, unspecified syncope type Acute   2. Alcohol abuse Acute   3. Acute exacerbation of chronic obstructive pulmonary disease (COPD) (HCC) Acute   4. Tobacco abuse Acute   5. Hypotension, unspecified hypotension type Acute   6. Bradycardia Acute   7. Hyponatremia Acute   8. Alcoholic intoxication without complication (HCC) Acute   9. Lactic acidosis Acute       Abel ROSS (Scribe), am scribing for, and in the presence of, Alejandro Acevedo.    Electronically signed by: Abel Husain (Scribe), 12/21/2023    IAlejandro personally performed the services described in this documentation, as scribed by Abel Husain in my presence, and it is both accurate and complete.    The note accurately reflects work and decisions made by me.  Alejandro Acevedo  12/21/2023  4:13 AM

## 2023-12-21 NOTE — HOSPITAL COURSE
Ms. Lloyd is a 88-year-old female with past medical history of Hypertension, Dyslipidemia, EtOH use, CKD 3, GERD, prior subdural hematoma s/p bur holes on 04/2023. Admitted 12/21/2023 due to syncope while patient was in the [facility]. In the ER she was noted to be hypotensive SBP in the 80s and patient noted to be mentally altered. She received IV fluid repletion and notable improvement of BP and mental status.  In the ER blood alcohol level 257 and Na of 123 considered d/t hypovolemia and EtOH use. CT head on admission did not showed acute intracranial abnormalities but incidental finding of right frontal falx meningioma. Patient was admitted to telemetry for IV fluid repletion and close BMP monitoring d/t hyponatremia. Due to syncope Echocardiogram done which showed LVEF 73% and no specific structural abnormalities. Patient' mental status imrpoved and back to baseline. Sodium improved slowly and gradually up to 134 on day of discharge. On day of discharge, 12/22/23, patient's medications reconciled again and she is not clear why her Furosemide is indicated and also recommended to discontinue taking Meloxicam daily. Discussed avoid EtOH use but if she does to not drink more than 1 glass a day and to always be accompanied. Notified her of the incidental finding of the meningioma and to schedule appointment with her PCP as soon as possible. She understand and is in agreement with the plan.

## 2023-12-21 NOTE — ED NOTES
Assumed care of pt, pt resting without complaint at this time, pt updated with plan of care, waiting for inpatient room

## 2023-12-21 NOTE — ASSESSMENT & PLAN NOTE
Sodium presentation 123, comes with syncope.  She has been drinking a lot tonight and drinks a lot in general but not every day  Likely related to dehydration and alcohol use-hypovolemic hyponatremia?    -Sodium on 12/22/2023 134.    -Trend BMP.

## 2023-12-21 NOTE — ED TRIAGE NOTES
"Chief Complaint   Patient presents with    Syncope    Hypotension     Patient BIBA from the [facility]. Patient was reported to be \"out of it\" then falling to the ground. Patient was initially A&Ox1 on scene for EMS. Patient is now A&Ox3. . Patient was hypotensive and braycardic on scene with a pressure of 40/--, then a LR bolus was initiated.       BIB EMS to [place] [id number], pt on monitor and in gown, labs drawn and sent. Pt consists of: Somulence.    Medications given en route:350 ml LR    Ht 1.6 m (5' 3\")   Wt 72.6 kg (160 lb)   BMI 28.34 kg/m²   "

## 2023-12-21 NOTE — ASSESSMENT & PLAN NOTE
Incidental finding on CT scan of the head.  Follow-up with periodic imaging and clinical assessment.  Patient currently denies headache, vision changes.

## 2023-12-21 NOTE — ASSESSMENT & PLAN NOTE
Hold lisinopril, metoprolol, furosemide due to presentation with syncope and hypotensive in the field

## 2023-12-21 NOTE — ED NOTES
Yoni from Lab called with critical result of lactic acid of 4.7 at 0420. Critical lab result read back to Yoni.   Dr. Acevedo notified of critical lab result at 0425.  Critical lab result read back by Dr. Acevedo.

## 2023-12-21 NOTE — ASSESSMENT & PLAN NOTE
Patient stated that she has started drinking increased alcohol after moving to [place].  She she drinks mostly when she goes out 3 drinks per day.    -[facility] protocol  -Continue folate and thiamine

## 2023-12-21 NOTE — ED NOTES
Patient attempting to leave AMA.  Removed IV. Educated by ERP and this RN not to leave AMA due to blood results being abnormal. Patient agreed to stay.

## 2023-12-21 NOTE — ASSESSMENT & PLAN NOTE
Patient smokes 5 to 6 cigarettes daily.  Nicotine patch and/or gum declined.   I discussed cessation with patient including starting on nicotine patch and/or gum on discharge.  I also discussed medications to help with cessation with patient including Wellbutrin and Chantix, willing to try Wellbutrin, on discharge. .

## 2023-12-22 VITALS
BODY MASS INDEX: 27.34 KG/M2 | HEIGHT: 63 IN | OXYGEN SATURATION: 94 % | DIASTOLIC BLOOD PRESSURE: 55 MMHG | RESPIRATION RATE: 18 BRPM | WEIGHT: 154.32 LBS | HEART RATE: 76 BPM | TEMPERATURE: 97.9 F | SYSTOLIC BLOOD PRESSURE: 136 MMHG

## 2023-12-22 PROBLEM — E83.39 HYPOPHOSPHATEMIA: Status: ACTIVE | Noted: 2023-12-22

## 2023-12-22 PROBLEM — E83.39 HYPOPHOSPHATEMIA: Status: RESOLVED | Noted: 2023-12-22 | Resolved: 2023-12-22

## 2023-12-22 PROBLEM — E87.29 ALCOHOLIC KETOACIDOSIS: Status: RESOLVED | Noted: 2023-12-21 | Resolved: 2023-12-22

## 2023-12-22 PROBLEM — E87.6 HYPOKALEMIA: Status: RESOLVED | Noted: 2023-04-03 | Resolved: 2023-12-22

## 2023-12-22 PROBLEM — R55 SYNCOPE AND COLLAPSE: Status: RESOLVED | Noted: 2023-12-21 | Resolved: 2023-12-22

## 2023-12-22 PROBLEM — E87.1 HYPONATREMIA: Status: RESOLVED | Noted: 2023-04-03 | Resolved: 2023-12-22

## 2023-12-22 PROBLEM — S06.5XAA SUBDURAL HEMATOMA (HCC): Status: RESOLVED | Noted: 2023-04-03 | Resolved: 2023-12-22

## 2023-12-22 LAB
ALBUMIN SERPL BCP-MCNC: 3.4 G/DL (ref 3.2–4.9)
ALBUMIN/GLOB SERPL: 1.8 G/DL
ALP SERPL-CCNC: 53 U/L (ref 30–99)
ALT SERPL-CCNC: 13 U/L (ref 2–50)
ANION GAP SERPL CALC-SCNC: 10 MMOL/L (ref 7–16)
ANION GAP SERPL CALC-SCNC: 11 MMOL/L (ref 7–16)
AST SERPL-CCNC: 31 U/L (ref 12–45)
BILIRUB SERPL-MCNC: 0.3 MG/DL (ref 0.1–1.5)
BUN SERPL-MCNC: 22 MG/DL (ref 8–22)
BUN SERPL-MCNC: 25 MG/DL (ref 8–22)
CALCIUM ALBUM COR SERPL-MCNC: 9.1 MG/DL (ref 8.5–10.5)
CALCIUM SERPL-MCNC: 8.3 MG/DL (ref 8.5–10.5)
CALCIUM SERPL-MCNC: 8.6 MG/DL (ref 8.5–10.5)
CHLORIDE SERPL-SCNC: 102 MMOL/L (ref 96–112)
CHLORIDE SERPL-SCNC: 102 MMOL/L (ref 96–112)
CO2 SERPL-SCNC: 21 MMOL/L (ref 20–33)
CO2 SERPL-SCNC: 22 MMOL/L (ref 20–33)
CREAT SERPL-MCNC: 0.94 MG/DL (ref 0.5–1.4)
CREAT SERPL-MCNC: 1.07 MG/DL (ref 0.5–1.4)
ERYTHROCYTE [DISTWIDTH] IN BLOOD BY AUTOMATED COUNT: 44.4 FL (ref 35.9–50)
ERYTHROCYTE [DISTWIDTH] IN BLOOD BY AUTOMATED COUNT: 45.8 FL (ref 35.9–50)
GFR SERPLBLD CREATININE-BSD FMLA CKD-EPI: 50 ML/MIN/1.73 M 2
GFR SERPLBLD CREATININE-BSD FMLA CKD-EPI: 58 ML/MIN/1.73 M 2
GLOBULIN SER CALC-MCNC: 1.9 G/DL (ref 1.9–3.5)
GLUCOSE SERPL-MCNC: 122 MG/DL (ref 65–99)
GLUCOSE SERPL-MCNC: 78 MG/DL (ref 65–99)
HCT VFR BLD AUTO: 31.4 % (ref 37–47)
HCT VFR BLD AUTO: 31.5 % (ref 37–47)
HGB BLD-MCNC: 10.7 G/DL (ref 12–16)
HGB BLD-MCNC: 10.7 G/DL (ref 12–16)
LACTATE SERPL-SCNC: 0.9 MMOL/L (ref 0.5–2)
MAGNESIUM SERPL-MCNC: 2 MG/DL (ref 1.5–2.5)
MAGNESIUM SERPL-MCNC: 2 MG/DL (ref 1.5–2.5)
MCH RBC QN AUTO: 27.2 PG (ref 27–33)
MCH RBC QN AUTO: 27.4 PG (ref 27–33)
MCHC RBC AUTO-ENTMCNC: 34 G/DL (ref 32.2–35.5)
MCHC RBC AUTO-ENTMCNC: 34.1 G/DL (ref 32.2–35.5)
MCV RBC AUTO: 79.9 FL (ref 81.4–97.8)
MCV RBC AUTO: 80.3 FL (ref 81.4–97.8)
PHOSPHATE SERPL-MCNC: 2.4 MG/DL (ref 2.5–4.5)
PLATELET # BLD AUTO: 209 K/UL (ref 164–446)
PLATELET # BLD AUTO: 217 K/UL (ref 164–446)
PMV BLD AUTO: 10.2 FL (ref 9–12.9)
PMV BLD AUTO: 10.3 FL (ref 9–12.9)
POTASSIUM SERPL-SCNC: 4.1 MMOL/L (ref 3.6–5.5)
POTASSIUM SERPL-SCNC: 4.4 MMOL/L (ref 3.6–5.5)
PROT SERPL-MCNC: 5.3 G/DL (ref 6–8.2)
RBC # BLD AUTO: 3.91 M/UL (ref 4.2–5.4)
RBC # BLD AUTO: 3.94 M/UL (ref 4.2–5.4)
SODIUM SERPL-SCNC: 134 MMOL/L (ref 135–145)
SODIUM SERPL-SCNC: 134 MMOL/L (ref 135–145)
VIT B12 SERPL-MCNC: 808 PG/ML (ref 211–911)
WBC # BLD AUTO: 4.3 K/UL (ref 4.8–10.8)
WBC # BLD AUTO: 6.2 K/UL (ref 4.8–10.8)

## 2023-12-22 PROCEDURE — 82607 VITAMIN B-12: CPT

## 2023-12-22 PROCEDURE — 85027 COMPLETE CBC AUTOMATED: CPT

## 2023-12-22 PROCEDURE — 700102 HCHG RX REV CODE 250 W/ 637 OVERRIDE(OP): Mod: JZ | Performed by: HOSPITALIST

## 2023-12-22 PROCEDURE — A9270 NON-COVERED ITEM OR SERVICE: HCPCS | Mod: JZ | Performed by: HOSPITALIST

## 2023-12-22 PROCEDURE — A9270 NON-COVERED ITEM OR SERVICE: HCPCS | Performed by: STUDENT IN AN ORGANIZED HEALTH CARE EDUCATION/TRAINING PROGRAM

## 2023-12-22 PROCEDURE — 700102 HCHG RX REV CODE 250 W/ 637 OVERRIDE(OP): Performed by: STUDENT IN AN ORGANIZED HEALTH CARE EDUCATION/TRAINING PROGRAM

## 2023-12-22 PROCEDURE — A9270 NON-COVERED ITEM OR SERVICE: HCPCS

## 2023-12-22 PROCEDURE — 83735 ASSAY OF MAGNESIUM: CPT

## 2023-12-22 PROCEDURE — 700102 HCHG RX REV CODE 250 W/ 637 OVERRIDE(OP)

## 2023-12-22 PROCEDURE — 80053 COMPREHEN METABOLIC PANEL: CPT

## 2023-12-22 PROCEDURE — 99239 HOSP IP/OBS DSCHRG MGMT >30: CPT | Mod: GC | Performed by: INTERNAL MEDICINE

## 2023-12-22 RX ORDER — OMEPRAZOLE 20 MG/1
40 CAPSULE, DELAYED RELEASE ORAL DAILY
Status: DISCONTINUED | OUTPATIENT
Start: 2023-12-22 | End: 2023-12-22 | Stop reason: HOSPADM

## 2023-12-22 RX ORDER — FOLIC ACID 1 MG/1
1 TABLET ORAL DAILY
Qty: 30 TABLET | Refills: 1 | Status: SHIPPED | OUTPATIENT
Start: 2023-12-23

## 2023-12-22 RX ORDER — LANOLIN ALCOHOL/MO/W.PET/CERES
100 CREAM (GRAM) TOPICAL DAILY
Qty: 30 TABLET | Refills: 0 | Status: SHIPPED | OUTPATIENT
Start: 2023-12-23

## 2023-12-22 RX ORDER — LISINOPRIL 10 MG/1
10 TABLET ORAL DAILY
Qty: 30 TABLET | Refills: 1 | Status: SHIPPED | OUTPATIENT
Start: 2023-12-22

## 2023-12-22 RX ADMIN — OMEPRAZOLE 40 MG: 20 CAPSULE, DELAYED RELEASE ORAL at 08:12

## 2023-12-22 RX ADMIN — Medication 100 MG: at 04:03

## 2023-12-22 RX ADMIN — FOLIC ACID 1 MG: 1 TABLET ORAL at 04:03

## 2023-12-22 RX ADMIN — POTASSIUM CHLORIDE 20 MEQ: 1500 TABLET, EXTENDED RELEASE ORAL at 06:00

## 2023-12-22 RX ADMIN — DIBASIC SODIUM PHOSPHATE, MONOBASIC POTASSIUM PHOSPHATE AND MONOBASIC SODIUM PHOSPHATE 250 MG: 852; 155; 130 TABLET ORAL at 06:33

## 2023-12-22 RX ADMIN — THERA TABS 1 TABLET: TAB at 04:03

## 2023-12-22 ASSESSMENT — ENCOUNTER SYMPTOMS
DOUBLE VISION: 0
PHOTOPHOBIA: 0
SHORTNESS OF BREATH: 0
TINGLING: 0
HEADACHES: 0
COUGH: 0
CHILLS: 0
ABDOMINAL PAIN: 0
TREMORS: 0
SPUTUM PRODUCTION: 0
FEVER: 0
HEMOPTYSIS: 0
DEPRESSION: 0
NAUSEA: 0
MYALGIAS: 0
PALPITATIONS: 0
NECK PAIN: 0
VOMITING: 0
HEARTBURN: 1
ORTHOPNEA: 0

## 2023-12-22 ASSESSMENT — LIFESTYLE VARIABLES
AUDITORY DISTURBANCES: NOT PRESENT
NAUSEA AND VOMITING: NO NAUSEA AND NO VOMITING
VISUAL DISTURBANCES: NOT PRESENT
ORIENTATION AND CLOUDING OF SENSORIUM: ORIENTED AND CAN DO SERIAL ADDITIONS
TOTAL SCORE: 0
AGITATION: NORMAL ACTIVITY
HEADACHE, FULLNESS IN HEAD: NOT PRESENT
ANXIETY: NO ANXIETY (AT EASE)
TOTAL SCORE: 0
VISUAL DISTURBANCES: NOT PRESENT
PAROXYSMAL SWEATS: NO SWEAT VISIBLE
ANXIETY: NO ANXIETY (AT EASE)
VISUAL DISTURBANCES: NOT PRESENT
ORIENTATION AND CLOUDING OF SENSORIUM: ORIENTED AND CAN DO SERIAL ADDITIONS
NAUSEA AND VOMITING: NO NAUSEA AND NO VOMITING
PAROXYSMAL SWEATS: NO SWEAT VISIBLE
PAROXYSMAL SWEATS: NO SWEAT VISIBLE
ANXIETY: NO ANXIETY (AT EASE)
ANXIETY: NO ANXIETY (AT EASE)
PAROXYSMAL SWEATS: NO SWEAT VISIBLE
VISUAL DISTURBANCES: NOT PRESENT
TOTAL SCORE: 0
AUDITORY DISTURBANCES: NOT PRESENT
VISUAL DISTURBANCES: NOT PRESENT
TOTAL SCORE: 0
ANXIETY: NO ANXIETY (AT EASE)
TREMOR: NO TREMOR
HEADACHE, FULLNESS IN HEAD: NOT PRESENT
TREMOR: NO TREMOR
AUDITORY DISTURBANCES: NOT PRESENT
ORIENTATION AND CLOUDING OF SENSORIUM: ORIENTED AND CAN DO SERIAL ADDITIONS
AGITATION: NORMAL ACTIVITY
NAUSEA AND VOMITING: NO NAUSEA AND NO VOMITING
AGITATION: NORMAL ACTIVITY
ORIENTATION AND CLOUDING OF SENSORIUM: ORIENTED AND CAN DO SERIAL ADDITIONS
HEADACHE, FULLNESS IN HEAD: NOT PRESENT
NAUSEA AND VOMITING: NO NAUSEA AND NO VOMITING
HEADACHE, FULLNESS IN HEAD: NOT PRESENT
AUDITORY DISTURBANCES: NOT PRESENT
TREMOR: NO TREMOR
AUDITORY DISTURBANCES: NOT PRESENT
ORIENTATION AND CLOUDING OF SENSORIUM: ORIENTED AND CAN DO SERIAL ADDITIONS
TREMOR: NO TREMOR
AGITATION: NORMAL ACTIVITY
AGITATION: NORMAL ACTIVITY
TOTAL SCORE: 0
TREMOR: NO TREMOR
PAROXYSMAL SWEATS: NO SWEAT VISIBLE
HEADACHE, FULLNESS IN HEAD: NOT PRESENT
NAUSEA AND VOMITING: NO NAUSEA AND NO VOMITING

## 2023-12-22 ASSESSMENT — FIBROSIS 4 INDEX: FIB4 SCORE: 2.78

## 2023-12-22 NOTE — PROGRESS NOTES
4 Eyes Skin Assessment Completed by Digna RN and FLORINA Oakes.    Head WDL  Ears WDL  Nose WDL  Mouth WDL  Neck WDL  Breast/Chest WDL  Shoulder Blades WDL  Spine WDL  (R) Arm/Elbow/Hand WDL  (L) Arm/Elbow/Hand WDL  Abdomen WDL  Groin WDL  Scrotum/Coccyx/Buttocks WDL  (R) Leg WDL  (L) Leg WDL  (R) Heel/Foot/Toe flaky, dry  (L) Heel/Foot/Toe flaky, dry          Devices In Places Tele Box      Interventions In Place Waffle Overlay    Possible Skin Injury No    Pictures Uploaded Into Epic N/A  Wound Consult Placed N/A  RN Wound Prevention Protocol Ordered No

## 2023-12-22 NOTE — PROGRESS NOTES
[facility] Internal Medicine Daily Progress Note    Date of Service  12/22/2023    [facility] Team: [facility] Team   Attending: Lavell Schwartz M.d.  Senior Resident: Dr. Fairbanks   Intern:  Dr. Mccall  Contact Number: [phone]    Chief Complaint  Lily Lloyd is a 88 y.o. female with PMH of hypertension, dyslipidemia, CKD 3, GERD admitted 12/21/2023 with syncope.    Hospital Course  Ms. Lloyd is a 88-year-old female with past medical history of hypertension dyslipidemia CKD 3, GERD presented 12/21/2023 with chief complaint of syncope.  Patient has 3 episode of fall in past 24 months.  Upon presentation in the ER she was noted to be hypotensive in 80s altered but noted improvement with the fluid.  Workup in the ER with blood alcohol level 247.  CT head showed no acute intracranial abnormality.  Of note patient has history of discomfort during hematoma s/p left bur hole evacuation on 4/23.  She also has a history of the alcohol use last drink on 12/21/2023.  Patient will be admitted to medical floor to manage dehydration and alcohol withdrawal.  During hospital admission patient to start vitals were negative.     Interval Problem Update  -AO x 4.  Denies nausea, vomiting, hallucinations, tremors, complain of lightheadedness while walking.  -Phosphorous 2.4-repleted, sodium 129-134, lactic acid 5.6-0.9  -Echo with EF 73%.  -Negative orthostatic blood pressure, Dc fluids.    I have discussed this patient's plan of care and discharge plan at IDT rounds today with Case Management, Nursing, Nursing leadership, and other members of the IDT team.    Consultants/Specialty       Code Status  Full Code    Disposition  The patient is not medically cleared for discharge to home or a post-acute facility.      I have placed the appropriate orders for post-discharge needs.    Review of Systems  Review of Systems   Constitutional:  Negative for chills and fever.   HENT:  Negative for hearing loss and tinnitus.    Eyes:  Negative for  double vision and photophobia.        Flashes in the left eye   Respiratory:  Negative for cough, hemoptysis, sputum production and shortness of breath.    Cardiovascular:  Negative for chest pain, palpitations and orthopnea.   Gastrointestinal:  Positive for heartburn. Negative for abdominal pain, nausea and vomiting.   Genitourinary:  Negative for dysuria and urgency.   Musculoskeletal:  Negative for myalgias and neck pain.   Neurological:  Negative for tingling, tremors and headaches.        Lightheadedness   Psychiatric/Behavioral:  Negative for depression and suicidal ideas.         Physical Exam  Temp:  [36 °C (96.8 °F)-36.9 °C (98.5 °F)] 36.6 °C (97.9 °F)  Pulse:  [] 69  Resp:  [16-18] 18  BP: (115-159)/(50-86) 134/61  SpO2:  [92 %-95 %] 95 %    Physical Exam  Constitutional:       Appearance: Normal appearance.   HENT:      Head: Normocephalic and atraumatic.      Nose: Nose normal.      Mouth/Throat:      Mouth: Mucous membranes are moist.      Pharynx: Oropharynx is clear.   Eyes:      Extraocular Movements: Extraocular movements intact.      Pupils: Pupils are equal, round, and reactive to light.   Cardiovascular:      Rate and Rhythm: Normal rate and regular rhythm.   Pulmonary:      Effort: Pulmonary effort is normal. No respiratory distress.      Breath sounds: Normal breath sounds. No wheezing or rales.   Abdominal:      General: Abdomen is flat. There is no distension.   Musculoskeletal:         General: Normal range of motion.      Cervical back: Normal range of motion and neck supple.      Right lower leg: No edema.      Left lower leg: No edema.   Skin:     General: Skin is warm.      Capillary Refill: Capillary refill takes less than 2 seconds.   Neurological:      General: No focal deficit present.      Mental Status: She is oriented to person, place, and time.   Psychiatric:         Mood and Affect: Mood normal.         Thought Content: Thought content normal.         Judgment: Judgment  normal.         Fluids    Intake/Output Summary (Last 24 hours) at 12/22/2023 0829  Last data filed at 12/22/2023 0400  Gross per 24 hour   Intake 660 ml   Output 1125 ml   Net -465 ml       Laboratory  Recent Labs     12/21/23  0310 12/21/23  2351 12/22/23  0742   WBC 6.3 4.3* 6.2   RBC 4.22 3.94* 3.91*   HEMOGLOBIN 11.5* 10.7* 10.7*   HEMATOCRIT 34.1* 31.5* 31.4*   MCV 80.8* 79.9* 80.3*   MCH 27.3 27.2 27.4   MCHC 33.7 34.0 34.1   RDW 45.0 44.4 45.8   PLATELETCT 241 217 209   MPV 10.0 10.2 10.3     Recent Labs     12/21/23  1153 12/21/23  1815 12/21/23  2351   SODIUM 129* 134* 134*   POTASSIUM 4.6 4.9 4.4   CHLORIDE 94* 99 102   CO2 15* 19* 21   GLUCOSE 62* 143* 122*   BUN 14 18 25*   CREATININE 0.90 1.00 1.07   CALCIUM 8.7 9.0 8.3*                   Imaging  EC-ECHOCARDIOGRAM COMPLETE W/O CONT   Final Result      CT-HEAD W/O   Final Result         1.  No acute intracranial abnormality is identified, there are nonspecific white matter changes, commonly associated with small vessel ischemic disease.  Associated mild cerebral atrophy is noted.   2.  Calcified mass abutting the right anterior falx, appearance most typical of meningioma.   3.  Bilateral maxillary and ethmoid sinusitis changes   4.  Atherosclerosis.         DX-CHEST-PORTABLE (1 VIEW)   Final Result         1.  Interstitial pulmonary parenchymal prominence suggest chronic underlying lung disease, component of interstitial edema and/or infiltrates not excluded.   2.  Round retrocardiac opacity, appearance favoring hiatal hernia   3.  Cardiomegaly   4.  Atherosclerosis           Assessment/Plan  Problem Representation:    * Syncope and collapse- (present on admission)  Assessment & Plan  Patient presented in the ER with chief complaint of syncope associated with lightheadedness, denies nausea, warmth, sweating.  Patient stated that she had 3 fall episode in last 24-month due to syncope.  Echo on this admission was reassuring with EF 73%.  No valvular  abnormality.  Patient stated that she used to have 3 drink every day.  Her last drink was on 12/21/2023 on this admission.  Syncope likely due to dehydration.     -discontinue IV fluid  -Negative orthostatic vital sign  -Echo reassuring with EF 73%  -dc tele  -fall precautions      Alcoholic ketoacidosis- (present on admission)  Assessment & Plan  -Resolved  On presentation bicarb 18, anion gap 16, lactic acid 4.7, Repeat lactic acid 5.6      Alcohol abuse- (present on admission)  Assessment & Plan  Patient stated that she has started drinking increased alcohol after moving to [place].  She she drinks mostly when she goes out 3 drinks per day.    -[facility] protocol  -Continue folate and thiamine    Meningioma (HCC)- (present on admission)  Assessment & Plan  Incidental finding on CT scan of the head.  Follow-up with periodic imaging and clinical assessment.  Patient currently denies headache, vision changes.    Hyponatremia- (present on admission)  Assessment & Plan  Sodium presentation 123, comes with syncope.  She has been drinking a lot tonight and drinks a lot in general but not every day  Likely related to dehydration and alcohol use-hypovolemic hyponatremia?    -Sodium on 12/22/2023 134.    -Trend BMP.        Subdural hematoma (HCC)- (present on admission)  Assessment & Plan  Hx of subdural hematoma in 4/23 due to fall.  S/p repair on 4/2023    Hypophosphatemia  Assessment & Plan  Phosphate 2.4 on repeat labs.  -Replete as needed    Hypokalemia- (present on admission)  Assessment & Plan  -Resolved   -replete as needed    Dyslipidemia- (present on admission)  Assessment & Plan  Continue statin    Stage 3a chronic kidney disease (HCC)- (present on admission)  Assessment & Plan  At baseline, avoid nephrotoxic agents    Tobacco abuse- (present on admission)  Assessment & Plan  Patient smokes 5 to 6 cigarettes daily.  Nicotine patch and/or gum declined.   I discussed cessation with patient including starting on nicotine  patch and/or gum on discharge.  I also discussed medications to help with cessation with patient including Wellbutrin and Chantix, willing to try Wellbutrin, on discharge. .    GERD (gastroesophageal reflux disease)- (present on admission)  Assessment & Plan  -Started omeprazole 40 Mg daily    Essential hypertension- (present on admission)  Assessment & Plan  Hold lisinopril, metoprolol, furosemide due to presentation with syncope and hypotensive in the field         VTE prophylaxis: enoxaparin ppx    I have performed a physical exam and reviewed and updated ROS and Plan today (12/22/2023). In review of yesterday's note (12/21/2023), there are no changes except as documented above.

## 2023-12-22 NOTE — CARE PLAN
The patient is Stable - Low risk of patient condition declining or worsening    Shift Goals  Clinical Goals: maintain pain free, safety, monitor CIWWA  Patient Goals: stop being dizzy and go home  Family Goals: cristine    Progress made toward(s) clinical / shift goals:        Problem: Knowledge Deficit - COPD  Goal: Patient/significant other demonstrates understanding of disease process, utilization of the Action Plan, medications and discharge instruction  Note: Educate for S&S of COPD and risk factors, discuss triggering factors and need for monitoring, stress importance of oral care      Problem: Risk for Infection - COPD  Goal: Patient will remain free from signs and symptoms of infection  Note: Educate regarding infection control procedures, keep room clean, emphasize oral care such as teeth brushing     Problem: Impaired Gas Exchange  Goal: Patient will demonstrate improved ventilation and adequate oxygenation and participate in treatment regimen within the level of ability/situation.  Note: Teach to cough and deep breathe regularly, make the room clean and quiet, monitor labs and VS     Problem: Fall Risk  Goal: Patient will remain free from falls  Note: Assess for fall risk and implement precautions to keep pt safe, hourly rounding       Patient is not progressing towards the following goals:

## 2023-12-22 NOTE — CARE PLAN
The patient is Stable - Low risk of patient condition declining or worsening    Shift Goals  Clinical Goals: orient to unit  Patient Goals: rest    Progress made toward(s) clinical / shift goals: CIWA protocol ordered- scored 0. LR running.      Problem: Seizure Precautions  Goal: Implementation of seizure precautions  Outcome: Progressing  Note: Padded side rales up, suction at bedside. No signs of WD or seizure this shift.

## 2023-12-22 NOTE — RESPIRATORY CARE
COPD EDUCATION by COPD CLINICAL EDUCATOR  12/21/2023  at  4:18 PM by Nel Calabrese RRT     Patient interviewed by education team.  Patient declined or is unable to participate in the full program.  Therefore, a short intervention has been conducted.  A comprehensive packet including information about COPD, types of treatments to manage their disease and safe home Oxygen usage was provided and reviewed with patient at the bedside.  Patient states she has never been diagnosed with COPD or used any COPD medications. The patient had a normal PFT in 2015 but has continued to smoke. Patient declined smoking cessation education and literature, but admits she sometimes gets short of breath walking up hills or stairs. Patient advised to see a pulmonologist and get PFT, provided list of local pulmonary clinics in the area.       COPD Screen  COPD Risk Screening  Do you have a history of COPD?: No  COPD Population Screener  During the past 4 weeks, how much did you feel short of breath?: Some of the time  Do you ever cough up any mucus or phlegm?: Yes, a few days a week or month  In the past 12 months, you do less than you used to because of your breathing problems: Agree  Have you smoked at least 100 cigarettes in your entire life?: Yes  How old are you?: 60+  COPD Screening Score: 7  COPD Coordinator Recommended: Yes (MD would like COPD educator to see patient due to extensive smoking history please)    COPD Assessment  COPD Clinical Specialists ONLY  COPD Education Initiated: Yes--Short Intervention (Pt denies COPD Dx, no COPD meds, declined smoking cessation education and literature. Given COPD booklet, [facility] info, and list of local pulmonary providers.)  Is this a COPD exacerbation patient?: No (COPD Coordinator consult placed only)  DME Company: none prior  DME Equipment Type: none prior  Physician Follow Up Appointment:  (declined apt assistance)  Physician Name: RONNI Crane  Pulmonologist Name: given list  "of local pulmonary clinics  Referrals Initiated: Yes  Pulmonary Rehab: N/A  Smoking Cessation: Declined  Hospice: N/A  Home Health Care: N/A  Mobile Urgent Care Services: N/A (given info)  Geriatric Specialty Group: N/A  Private In-Home Care Agency: N/A  $ Demo/Eval of SVN's, MDI's and Aerosols: Yes (currently no COPD meds, discussed use of inhalers, will request OP Rx for Albuterol MDI upon discharge)  (OP) Pulmonary Function Testing: Yes (7/3/2015: Normal, but pt has continued to smoke)  Interdisciplinary Rounds: Attendance at Rounds (30 Min)    PFT Results    7/3/2015: Normal, but pt has continued to smoke    Meds to Beds  [facility] provides bedside medication delivery for all eligible patients at discharge.  Would you like to opt out of this program for any reason?: No - Stay Opted In     MY COPD ACTION PLAN     It is recommended that patients and physicians /healthcare providers complete this action plan together. This plan should be discussed at each physician visit and updated as needed.    The green, yellow and red zones show groups of symptoms of COPD. This list of symptoms is not comprehensive, and you may experience other symptoms. In the \"Actions\" column, your healthcare provider has recommended actions for you to take based on your symptoms.    Patient Name: Lily Lloyd   YOB: 1935   Last Updated on:     Green Zone:  I am doing well today Actions     Usual activitiy and exercise level   Take daily medications     Usual amounts of cough and phlegm/mucus   Use oxygen as prescribed     Sleep well at night   Continue regular exercise/diet plan     Appetite is good   At all times avoid cigarette smoke, inhaled irritants     Daily Medications (these medications are taken every day):                Yellow Zone:  I am having a bad day or a COPD flare Actions     More breathless than usual   Continue daily medications     I have less energy for my daily activities   Use quick relief inhaler as " "ordered     Increased or thicker phlegm/mucus   Use oxygen as prescribed     Using quick relief inhaler/nebulizer more often   Get plenty of rest     Swelling of ankles more than usual   Use pursed lip breathing     More coughing than usual   At all times avoid cigarette smoke, inhaled irritants     I feel like I have a \"chest cold\"     Poor sleep and my symptoms woke me up     My appetite is not good     My medicine is not helping      Call provider immediately if symptoms don’t improve     Continue daily medications, add rescue medications:   Albuterol 2 Puffs         Medications to be used during a flare up, (as Discussed with Provider):           Additional Information:  Take as directed, use with a spacer.     Red Zone:  I need urgent medical care Actions     Severe shortness of breath even at rest   Call 911 or seek medical care immediately     Not able to do any activity because of breathing      Fever or shaking chills      Feeling confused or very drowsy       Chest pains      Coughing up blood                  "

## 2023-12-23 NOTE — DISCHARGE SUMMARY
"[facility] Internal Medicine Discharge Summary    Attending: Dr. Schwartz  Senior Resident: Dr. Fairbanks  Intern:  Keely Ramirez  Contact Number: [phone]    CHIEF COMPLAINT ON ADMISSION  Chief Complaint   Patient presents with    Syncope    Hypotension     Patient BIBA from the [facility]. Patient was reported to be \"out of it\" then falling to the ground. Patient was initially A&Ox1 on scene for EMS. Patient is now A&Ox3. . Patient was hypotensive and braycardic on scene with a pressure of 40/--, then a LR bolus was initiated.       Reason for Admission  Syncopal episode     Admission Date  12/21/2023    CODE STATUS  Full Code    HPI & HOSPITAL COURSE  Ms. Lloyd is a 88-year-old female with past medical history of Hypertension, Dyslipidemia, EtOH use, CKD 3, GERD, prior subdural hematoma s/p bur holes on 04/2023. Admitted 12/21/2023 due to syncope while patient was in the [facility]. In the ER she was noted to be hypotensive SBP in the 80s and patient noted to be mentally altered. She received IV fluid repletion and notable improvement of BP and mental status.  In the ER blood alcohol level 257 and Na of 123 considered d/t hypovolemia and EtOH use. CT head on admission did not showed acute intracranial abnormalities but incidental finding of right frontal falx meningioma. Patient was admitted to telemetry for IV fluid repletion and close BMP monitoring d/t hyponatremia. Due to syncope Echocardiogram done which showed LVEF 73% and no specific structural abnormalities. Patient' mental status imrpoved and back to baseline. Sodium improved slowly and gradually up to 134 on day of discharge. On day of discharge, 12/22/23, patient's medications reconciled again and she is not clear why her Furosemide is indicated and also recommended to discontinue taking Meloxicam daily. Discussed avoid EtOH use but if she does to not drink more than 1 glass a day and to always be accompanied. Notified her of the incidental " "finding of the meningioma and to schedule appointment with her PCP as soon as possible. She understand and is in agreement with the plan.      Therefore, she is discharged in good and stable condition to home with close outpatient follow-up.    The patient recovered much more quickly than anticipated on admission.    Discharge Date  12/22/23    Physical Exam on Day of Discharge  Physical Exam  Constitutional:       General: She is not in acute distress.     Appearance: Normal appearance. She is obese. She is not ill-appearing.   Cardiovascular:      Rate and Rhythm: Normal rate and regular rhythm.      Pulses: Normal pulses.      Heart sounds: Normal heart sounds.   Pulmonary:      Effort: Pulmonary effort is normal. No respiratory distress.      Breath sounds: Normal breath sounds.   Neurological:      Comments: Alert and oriented to person, time, and place  Follows commands  Speech is fluent  Pupils reactive to light and accomodation  Occular movements preserved  Facial symmetry preserved  Able to tolerate antigravity bilaterally, upper and lower extremities  No pronator drift  Sensation preserved throughout   DTR preserved, 2+ throughout         FOLLOW UP ITEMS POST DISCHARGE  Primary care physician.   Neurology/Neurosurgery.     DISCHARGE DIAGNOSES  Principal Problem (Resolved):    Syncope and collapse (POA: Yes)  Active Problems:    Essential hypertension (POA: Yes)    GERD (gastroesophageal reflux disease) (POA: Yes)    Tobacco abuse (POA: Yes)    Stage 3a chronic kidney disease (HCC) (POA: Yes)      Overview: [facility]. \"Need to include - controlled with current medications or give       update on current work up/treatment\", GFR 50/41 on 9/25/14    Dyslipidemia (POA: Yes)    Alcohol abuse (POA: Yes)    Meningioma (HCC) (POA: Yes)  Resolved Problems:    Subdural hematoma (HCC) (POA: Yes)    Hyponatremia (POA: Yes)    Hypokalemia (POA: Yes)    Alcoholic ketoacidosis (POA: Yes)    Hypophosphatemia (POA: " Unknown)      FOLLOW UP  No future appointments.  LEOBARDO CraneN.  [id number]  [place] [address] [phone]    Follow up        MEDICATIONS ON DISCHARGE     Medication List        START taking these medications        Instructions   diclofenac sodium 1 % Gel  Commonly known as: Voltaren   Apply 2 g topically 4 times a day as needed (Pain in knee and or shoulder).  Dose: 2 g     folic acid 1 MG Tabs  Start taking on: December 23, 2023  Commonly known as: Folvite   Take 1 Tablet by mouth every day.  Dose: 1 mg     lisinopril 10 MG Tabs  Commonly known as: Prinivil   Take 1 Tablet by mouth every day.  Dose: 10 mg     multivitamin Tabs  Start taking on: December 23, 2023   Take 1 Tablet by mouth every day.  Dose: 1 Tablet     thiamine 100 MG tablet  Start taking on: December 23, 2023  Commonly known as: Thiamine   Take 1 Tablet by mouth every day.  Dose: 100 mg            CONTINUE taking these medications        Instructions   fluticasone 50 MCG/ACT nasal spray  Commonly known as: Flonase   Administer 1 Spray into affected nostril(S) every day.  Dose: 1 Spray     furosemide 40 MG Tabs  Commonly known as: Lasix   Take 40 mg by mouth every day.  Dose: 40 mg     loratadine 10 MG Tabs  Commonly known as: Claritin   Take 10 mg by mouth every day.  Dose: 10 mg     potassium chloride SA 20 MEQ Tbcr  Commonly known as: Kdur   Take 1 Tablet by mouth every day.  Dose: 20 mEq     simvastatin 10 MG Tabs  Commonly known as: Zocor   Take 10 mg by mouth every evening.  Dose: 10 mg            STOP taking these medications      meloxicam 7.5 MG Tabs  Commonly known as: Mobic              Allergies  Allergies   Allergen Reactions    Pcn [Penicillins] Rash and Swelling    Sulfa Drugs Hives and Itching    Tape        DIET  Orders Placed This Encounter   Procedures    Diet Order Diet: Regular     Standing Status:   Standing     Number of Occurrences:   1     Order Specific Question:   Diet:     Answer:    Regular [1]       ACTIVITY  As tolerated.  Weight bearing as tolerated    CONSULTATIONS  None    PROCEDURES  None    LABORATORY  Lab Results   Component Value Date    SODIUM 134 (L) 12/22/2023    POTASSIUM 4.1 12/22/2023    CHLORIDE 102 12/22/2023    CO2 22 12/22/2023    GLUCOSE 78 12/22/2023    BUN 22 12/22/2023    CREATININE 0.94 12/22/2023    CREATININE 1.0 06/10/2005        Lab Results   Component Value Date    WBC 6.2 12/22/2023    HEMOGLOBIN 10.7 (L) 12/22/2023    HEMATOCRIT 31.4 (L) 12/22/2023    PLATELETCT 209 12/22/2023        Total time of the discharge process exceeds 45 minutes.

## 2023-12-23 NOTE — PROGRESS NOTES
Pt DC'd. IV removed, discharge instructions provided to patient, pt verbalizes understanding. Pt states all questions have been answered. Copy of discharge paperwork provided to pt, signed copy in chart. Pt states all belongings in possession. Pt left unit via

## 2023-12-23 NOTE — DISCHARGE INSTRUCTIONS
Mrs. Lloyd, please follow this recommendations:     - Schedule appointment AS SOON AS POSSIBLE with your Primary care to review and clarify your current medications including Meloxicam and Furosemide specifically.   - Please discontinue your daily Meloxicam and prioritize Tylenol (650 to 1000 mg up to 8 hrs per day; not to exceed 4 gr per day) +/- warm pads (up to 5 times per day and 15 minutes each time with a       towel over your skin to prevent any burning) +/- Diclofenac gel (up to 4 times per day, every 6 hours)  to affected area.   - As discussed, there was an incidental finding of a lesion in the CT scan of your head suggestive of Meningioma. Please discuss with you Primary care about this to devise a follow-up plan.   - Regarding your alcohol use, please do not drink more than 1 cup per day if you do. Ideally, make sure there is always someone with you.   - Make sure that you drink at least 50 oz of fluids per day (water, juice, tea) and not more than 60 oz.   - If recurrence of syncope, please seek emergent medical attention.

## 2023-12-23 NOTE — PROGRESS NOTES
Pt sent to [facility] lounge. Plan to [place] home via private car, sister at bedside. VSS. On RA. Denied pain. All belongings sent with patient.

## 2023-12-24 LAB — VIT B1 BLD-MCNC: 209 NMOL/L (ref 70–180)

## 2024-04-05 ENCOUNTER — APPOINTMENT (OUTPATIENT)
Dept: RADIOLOGY | Facility: MEDICAL CENTER | Age: 89
End: 2024-04-05
Attending: EMERGENCY MEDICINE
Payer: MEDICARE

## 2024-04-05 ENCOUNTER — HOSPITAL ENCOUNTER (EMERGENCY)
Facility: MEDICAL CENTER | Age: 89
End: 2024-04-06
Attending: EMERGENCY MEDICINE
Payer: MEDICARE
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
## 2024-04-05 DIAGNOSIS — S09.90XA CLOSED HEAD INJURY, INITIAL ENCOUNTER: ICD-10-CM

## 2024-04-05 DIAGNOSIS — Z78.9 ALCOHOL USE: ICD-10-CM

## 2024-04-05 DIAGNOSIS — S00.83XA CONTUSION OF FACE, INITIAL ENCOUNTER: ICD-10-CM

## 2024-04-05 PROCEDURE — 70450 CT HEAD/BRAIN W/O DYE: CPT

## 2024-04-05 PROCEDURE — 72125 CT NECK SPINE W/O DYE: CPT

## 2024-04-05 PROCEDURE — 99285 EMERGENCY DEPT VISIT HI MDM: CPT

## 2024-04-05 ASSESSMENT — PAIN DESCRIPTION - PAIN TYPE: TYPE: ACUTE PAIN

## 2024-04-05 ASSESSMENT — FIBROSIS 4 INDEX: FIB4 SCORE: 3.62

## 2024-04-06 VITALS
TEMPERATURE: 98.6 F | BODY MASS INDEX: 27.6 KG/M2 | HEART RATE: 69 BPM | HEIGHT: 62 IN | OXYGEN SATURATION: 97 % | RESPIRATION RATE: 18 BRPM | WEIGHT: 150 LBS | DIASTOLIC BLOOD PRESSURE: 70 MMHG | SYSTOLIC BLOOD PRESSURE: 142 MMHG

## 2024-04-06 NOTE — DISCHARGE INSTRUCTIONS
Follow-up with primary care this week for reevaluation, medication management.    Do not drink alcohol to excess.  Do not drink alcohol while taking blood thinners.  Do not drink alcohol and then plan to drive.    Continue home medications as previously indicated.  Encourage nonalcoholic oral fluid hydration, balanced diet.    Return to the emergency department for headache, altered mental status, seizure, focal weakness, syncope, vomiting or other new concerns.

## 2024-04-06 NOTE — ED NOTES
Pt ready for discharge. Discharge education was provided to pt by this RN. Pt verbalized understanding & all questions were answered. IV removed. Pt AoX 4. Pt ambulated independently with a steady gait out of the ED with all belongings. Pt is being picked up by sister. Pt encouraged to come back if symptoms worsen.

## 2024-04-06 NOTE — ED TRIAGE NOTES
"Chief Complaint   Patient presents with    T-5000 GLF     [place]. +LOC for unknown time, +HS, +thinners. +etoh. Pt arrives GCS 14 - AAOx3. Pt arrives with hematoma to L eyebrow        Per EMS, pt sustained witnessed fall from 2-3 steps.  per EMS.     Pt arrives AAOx3, does not remember falling or why she takes Plavix. Pt reports she had 3-4 alcoholic beverages tonight. Pt denies any pain. C-collar in place on arrival. Pt seen by ANTWAN Sexton on arrival.         BP (!) 142/70   Pulse 69   Resp 18   Ht 1.575 m (5' 2\")   Wt 68 kg (150 lb)   SpO2 97%   BMI 27.44 kg/m²     "

## 2024-04-06 NOTE — ED PROVIDER NOTES
ED Provider Note    CHIEF COMPLAINT  Chief Complaint   Patient presents with    T-5000 GLF     [facility]IBA from [facility] for fall from stairs. +LOC for unknown time, +HS, +thinners. +etoh. Pt arrives GCS 14 - AAOx3. Pt arrives with hematoma to L eyebrow        EXTERNAL RECORDS REVIEWED  Inpatient Notes discharge summary 12/22/2023 after admission for low readings syncope and hypotension.  History EtOH use, prior subdural hematoma status post bur holes on 4/2023.  Admitted due to syncope while at the [facility].  Hypotensive, received IV fluid repletion.  Blood alcohol was 257, sodium of 123.  CT head without no acute abnormalities.  Echocardiogram with LVEF 73%, no structural abnormalities.  Mental status back to baseline upon sobriety.  Sodium improved as well.    HPI/ROS  LIMITATION TO HISTORY   Select: : None  OUTSIDE HISTORIAN(S):  EMS with GCS 15, blood glucose appropriate, hard of hearing and intoxicated    Lily Lloyd is a 89 y.o. female who presents to the emergency department by ambulance from [facility] after mechanical fall.  Patient witnessed mechanical fall down to steps, struck her head, left face on the concrete/tile ground.  Denies loss of consciousness.  Patient does take Plavix.  She denies headache, neck pain or other injury at this time.  Admits to drinking alcohol.    PAST MEDICAL HISTORY   has a past medical history of Arthritis, Breath shortness, CATARACT, Diabetes, Heart burn, Hiatus hernia syndrome, Hypertension, Indigestion, Pain, Renal disorder, and Snoring.    SURGICAL HISTORY   has a past surgical history that includes nasal polypectomy (8/19/2010); ethmoidectomy (8/19/2010); antrostomy (8/19/2010); knee arthroplasty total; appendectomy; sinusotomies (12/20/2012); nasal polypectomy (12/20/2012); abdominal hysterectomy total; oophorectomy; cataract phaco with iol (8/6/2013); cataract phaco with iol (8/20/2013); and stevie holes (Left, 4/3/2023).    FAMILY HISTORY  Family History   Problem  "Relation Age of Onset    Cancer Sister         breast cancer    Cancer Paternal Aunt         breast cancer    Diabetes Father     Cancer Sister        SOCIAL HISTORY  Social History     Tobacco Use    Smoking status: Every Day     Current packs/day: 0.50     Average packs/day: 0.5 packs/day for 50.0 years (25.0 ttl pk-yrs)     Types: Cigarettes    Smokeless tobacco: Current    Tobacco comments:     0.5 PPD X45 YRS   Vaping Use    Vaping Use: Never used   Substance and Sexual Activity    Alcohol use: Yes     Alcohol/week: 1.0 oz     Types: 2 Glasses of wine per week     Comment: socially    Drug use: No    Sexual activity: Not Currently       CURRENT MEDICATIONS  Home Medications       Reviewed by Desiree Amos R.N. (Registered Nurse) on 04/05/24 at 2321  Med List Status: Partial     Medication Last Dose Status   diclofenac sodium (VOLTAREN) 1 % Gel  Active   fluticasone (FLONASE) 50 MCG/ACT nasal spray  Active   folic acid (FOLVITE) 1 MG Tab  Active   furosemide (LASIX) 40 MG Tab  Active   lisinopril (PRINIVIL) 10 MG Tab  Active   loratadine (CLARITIN) 10 MG Tab  Active   multivitamin Tab  Active   potassium chloride SA (KDUR) 20 MEQ Tab CR  Active   simvastatin (ZOCOR) 10 MG Tab  Active   thiamine (THIAMINE) 100 MG tablet  Active                    ALLERGIES  Allergies   Allergen Reactions    Pcn [Penicillins] Rash and Swelling    Sulfa Drugs Hives and Itching    Tape        PHYSICAL EXAM  VITAL SIGNS: BP (!) 142/70   Pulse 69   Resp 18   Ht 1.575 m (5' 2\")   Wt 68 kg (150 lb)   SpO2 97%   BMI 27.44 kg/m²    Pulse ox interpretation: I interpret this pulse ox as normal.  Constitutional: Alert in no apparent distress.  HENT: Normocephalic, small left brow hematoma without laceration.  No other cephalohematoma.Bilateral external ears normal, Nose normal. Moist mucous membranes.  No oral trauma.  Eyes: Pupils are equal and reactive, Conjunctiva normal.   Neck: N no midline tenderness palpation, no " step-offs.  Cervical collar remains in place..   Cardiovascular: Regular rate and rhythm, no murmurs. Distal pulses intact.  No chest wall tenderness, crepitus  Thorax & Lungs: Normal breath sounds.  No wheezing/rales/ronchi. No increased work of breathing, clipped speech or retractions.   Abdomen: Soft, non-distended, non-tender to palpation.   Skin: Warm, Dry, No erythema, No rash.   Musculoskeletal: No midline thoracic or lumbar discomfort, no step-offs.  Pelvis stable, no diastases.  Good range of motion in all major joints. No major deformities noted.   Neurologic: Alert and orient x 3.  Speech is clear.  Moves 4 extremities spontaneously.  Psychiatric: Intoxicated, otherwise cooperative      RADIOLOGY  I have independently interpreted the diagnostic imaging associated with this visit and am waiting the final reading from the radiologist.     Radiologist interpretation:  CT-CSPINE WITHOUT PLUS RECONS   Final Result      1.  There is no acute fracture of the cervical spine.         CT-HEAD W/O   Final Result      1.  No acute intracranial hemorrhage.   2.  Mild diffuse atrophy.   3.  There is low attenuation in the periventricular white matter most consistent with chronic small vessel ischemic change, although gliosis and demyelination could have this appearance.   4.  Chronic sinus disease.             COURSE & MEDICAL DECISION MAKING    ASSESSMENT, COURSE AND PLAN  Care Narrative:   Seen evaluated at charge desk per TBI protocol.  Neurologically intact and nonfocal.  She does take Plavix, witnessed fall, no history for LOC.  Left brow hematoma without other physical clinical evidence for trauma.  Add CT head, cervical spine.    12:04 AM imaging is unremarkable.  Will p.o. challenge, road test, remove cervical collar.  Patient will require a sober ride home.    ADDITIONAL PROBLEMS MANAGED  Alcohol dependence    DISPOSITION AND DISCUSSIONS  ED evaluation following fall with head injury is unrevealing.  Facial  contusion without laceration.  Neurologically intact and nonfocal.  Intoxicated, history of the same but appropriate otherwise.  Hemodynamically stable.  Tolerating oral fluids, ambulates independently.  Stable for discharge home with a sober /family member was contacted.    Discussion of management with other [facility] or appropriate source(s): None     Escalation of care considered, and ultimately not performed:Laboratory analysis    Decision tools and prescription drugs considered including, but not limited to:  None .    The patient is stable for discharge home, anticipatory guidance provided, close follow-up is encouraged and strict return instructions have been discussed. Patient is agreeable to the disposition and plan.      FINAL DIAGNOSIS  1. Closed head injury, initial encounter    2. Contusion of face, initial encounter    3. Fall from ground level    4. Alcohol use           Electronically signed by: Zuleika Sexton D.O., 4/6/2024 12:04 AM

## 2024-04-06 NOTE — ED NOTES
Pt's c-collar removed per ERP. Pt passed PO challenge and Pt ambulated to the bathroom with a steady gait. Denies dizziness/lightheadedness. No balance issues observed. Pt states her ride is on the way to the ED for .

## 2024-07-31 ENCOUNTER — HOSPITAL ENCOUNTER (EMERGENCY)
Facility: MEDICAL CENTER | Age: 89
End: 2024-08-01
Attending: STUDENT IN AN ORGANIZED HEALTH CARE EDUCATION/TRAINING PROGRAM
Payer: MEDICARE

## 2024-07-31 ENCOUNTER — APPOINTMENT (OUTPATIENT)
Dept: RADIOLOGY | Facility: MEDICAL CENTER | Age: 89
End: 2024-07-31
Attending: STUDENT IN AN ORGANIZED HEALTH CARE EDUCATION/TRAINING PROGRAM
Payer: MEDICARE

## 2024-07-31 DIAGNOSIS — S00.03XA HEMATOMA OF SCALP, INITIAL ENCOUNTER: ICD-10-CM

## 2024-07-31 DIAGNOSIS — E87.1 HYPONATREMIA: ICD-10-CM

## 2024-07-31 DIAGNOSIS — R94.31 PROLONGED Q-T INTERVAL ON ECG: ICD-10-CM

## 2024-07-31 DIAGNOSIS — S09.90XA CLOSED HEAD INJURY, INITIAL ENCOUNTER: ICD-10-CM

## 2024-07-31 PROCEDURE — 36415 COLL VENOUS BLD VENIPUNCTURE: CPT

## 2024-07-31 PROCEDURE — 700101 HCHG RX REV CODE 250: Performed by: STUDENT IN AN ORGANIZED HEALTH CARE EDUCATION/TRAINING PROGRAM

## 2024-07-31 PROCEDURE — 96365 THER/PROPH/DIAG IV INF INIT: CPT

## 2024-07-31 PROCEDURE — 99285 EMERGENCY DEPT VISIT HI MDM: CPT

## 2024-07-31 PROCEDURE — 700105 HCHG RX REV CODE 258: Performed by: STUDENT IN AN ORGANIZED HEALTH CARE EDUCATION/TRAINING PROGRAM

## 2024-07-31 PROCEDURE — 72125 CT NECK SPINE W/O DYE: CPT

## 2024-07-31 PROCEDURE — 70450 CT HEAD/BRAIN W/O DYE: CPT

## 2024-07-31 PROCEDURE — 93005 ELECTROCARDIOGRAM TRACING: CPT | Performed by: STUDENT IN AN ORGANIZED HEALTH CARE EDUCATION/TRAINING PROGRAM

## 2024-07-31 PROCEDURE — 700111 HCHG RX REV CODE 636 W/ 250 OVERRIDE (IP): Performed by: STUDENT IN AN ORGANIZED HEALTH CARE EDUCATION/TRAINING PROGRAM

## 2024-07-31 PROCEDURE — 82077 ASSAY SPEC XCP UR&BREATH IA: CPT

## 2024-07-31 PROCEDURE — 85025 COMPLETE CBC W/AUTO DIFF WBC: CPT

## 2024-07-31 PROCEDURE — 80053 COMPREHEN METABOLIC PANEL: CPT

## 2024-07-31 RX ORDER — LIDOCAINE HYDROCHLORIDE AND EPINEPHRINE 10; 10 MG/ML; UG/ML
20 INJECTION, SOLUTION INFILTRATION; PERINEURAL ONCE
Status: COMPLETED | OUTPATIENT
Start: 2024-07-31 | End: 2024-07-31

## 2024-07-31 RX ORDER — MAGNESIUM SULFATE HEPTAHYDRATE 40 MG/ML
2 INJECTION, SOLUTION INTRAVENOUS ONCE
Status: COMPLETED | OUTPATIENT
Start: 2024-07-31 | End: 2024-08-01

## 2024-07-31 RX ORDER — SODIUM CHLORIDE 9 MG/ML
INJECTION, SOLUTION INTRAVENOUS ONCE
Status: COMPLETED | OUTPATIENT
Start: 2024-07-31 | End: 2024-08-01

## 2024-07-31 RX ADMIN — MAGNESIUM SULFATE HEPTAHYDRATE 2 G: 2 INJECTION, SOLUTION INTRAVENOUS at 23:46

## 2024-07-31 RX ADMIN — LIDOCAINE HYDROCHLORIDE AND EPINEPHRINE 20 ML: 10; 10 INJECTION, SOLUTION INFILTRATION; PERINEURAL at 23:41

## 2024-07-31 RX ADMIN — SODIUM CHLORIDE: 9 INJECTION, SOLUTION INTRAVENOUS at 23:40

## 2024-07-31 ASSESSMENT — PAIN DESCRIPTION - PAIN TYPE: TYPE: ACUTE PAIN

## 2024-07-31 ASSESSMENT — FIBROSIS 4 INDEX: FIB4 SCORE: 3.62

## 2024-08-01 VITALS
OXYGEN SATURATION: 92 % | BODY MASS INDEX: 23.92 KG/M2 | HEIGHT: 62 IN | WEIGHT: 130 LBS | HEART RATE: 56 BPM | DIASTOLIC BLOOD PRESSURE: 64 MMHG | TEMPERATURE: 98 F | SYSTOLIC BLOOD PRESSURE: 143 MMHG | RESPIRATION RATE: 16 BRPM

## 2024-08-01 LAB
ALBUMIN SERPL BCP-MCNC: 4 G/DL (ref 3.2–4.9)
ALBUMIN/GLOB SERPL: 1.7 G/DL
ALP SERPL-CCNC: 76 U/L (ref 30–99)
ALT SERPL-CCNC: 13 U/L (ref 2–50)
ANION GAP SERPL CALC-SCNC: 15 MMOL/L (ref 7–16)
AST SERPL-CCNC: 22 U/L (ref 12–45)
BASOPHILS # BLD AUTO: 0.5 % (ref 0–1.8)
BASOPHILS # BLD: 0.03 K/UL (ref 0–0.12)
BILIRUB SERPL-MCNC: 0.2 MG/DL (ref 0.1–1.5)
BUN SERPL-MCNC: 14 MG/DL (ref 8–22)
CALCIUM ALBUM COR SERPL-MCNC: 8.9 MG/DL (ref 8.5–10.5)
CALCIUM SERPL-MCNC: 8.9 MG/DL (ref 8.5–10.5)
CHLORIDE SERPL-SCNC: 93 MMOL/L (ref 96–112)
CO2 SERPL-SCNC: 19 MMOL/L (ref 20–33)
CREAT SERPL-MCNC: 0.9 MG/DL (ref 0.5–1.4)
EKG IMPRESSION: NORMAL
EOSINOPHIL # BLD AUTO: 0.22 K/UL (ref 0–0.51)
EOSINOPHIL NFR BLD: 3.6 % (ref 0–6.9)
ERYTHROCYTE [DISTWIDTH] IN BLOOD BY AUTOMATED COUNT: 45.1 FL (ref 35.9–50)
ETHANOL BLD-MCNC: 179.8 MG/DL
GFR SERPLBLD CREATININE-BSD FMLA CKD-EPI: 61 ML/MIN/1.73 M 2
GLOBULIN SER CALC-MCNC: 2.4 G/DL (ref 1.9–3.5)
GLUCOSE SERPL-MCNC: 99 MG/DL (ref 65–99)
HCT VFR BLD AUTO: 40.4 % (ref 37–47)
HGB BLD-MCNC: 14 G/DL (ref 12–16)
IMM GRANULOCYTES # BLD AUTO: 0.02 K/UL (ref 0–0.11)
IMM GRANULOCYTES NFR BLD AUTO: 0.3 % (ref 0–0.9)
LYMPHOCYTES # BLD AUTO: 1.48 K/UL (ref 1–4.8)
LYMPHOCYTES NFR BLD: 24.2 % (ref 22–41)
MCH RBC QN AUTO: 30.5 PG (ref 27–33)
MCHC RBC AUTO-ENTMCNC: 34.7 G/DL (ref 32.2–35.5)
MCV RBC AUTO: 88 FL (ref 81.4–97.8)
MONOCYTES # BLD AUTO: 0.38 K/UL (ref 0–0.85)
MONOCYTES NFR BLD AUTO: 6.2 % (ref 0–13.4)
NEUTROPHILS # BLD AUTO: 3.99 K/UL (ref 1.82–7.42)
NEUTROPHILS NFR BLD: 65.2 % (ref 44–72)
NRBC # BLD AUTO: 0 K/UL
NRBC BLD-RTO: 0 /100 WBC (ref 0–0.2)
PLATELET # BLD AUTO: 227 K/UL (ref 164–446)
PMV BLD AUTO: 9.8 FL (ref 9–12.9)
POTASSIUM SERPL-SCNC: 3.9 MMOL/L (ref 3.6–5.5)
PROT SERPL-MCNC: 6.4 G/DL (ref 6–8.2)
RBC # BLD AUTO: 4.59 M/UL (ref 4.2–5.4)
SODIUM SERPL-SCNC: 127 MMOL/L (ref 135–145)
WBC # BLD AUTO: 6.1 K/UL (ref 4.8–10.8)

## 2024-08-01 PROCEDURE — 96366 THER/PROPH/DIAG IV INF ADDON: CPT

## 2024-08-01 NOTE — ED NOTES
PIV removed, catheter intact. Discharge education provided. Discharge paperwork reviewed with pt. All questions answered. All belongings with pt. Pt ambulated to lobby unassisted with steady gait.

## 2024-08-01 NOTE — ED TRIAGE NOTES
Lily Lloyd  89 y.o. female  Chief Complaint   Patient presents with    T-5000 Head Injury     VITALIY DELEON from [facility] for GLF with etoh involvement. Pt was found outside [facility] by security bleeding from back of head. Bleeding controlled on arrival. + HS, unk LOC, - thinner.      VITALIY DELEON from [facility] for above. TBI activated, pt expedited to CT. Report given to beside RN, Hudson.    Pt is alert, oriented, and follows commands. Admits to ETOH, 2-3 rum & coke.Pt speaking in full sentences and responds appropriately to questions. No acute distress noted in triage. Respirations are even and unlabored.     Pt to BLUE 13.    Vitals:    07/31/24 2243   BP: 128/70   Pulse: 60   Resp: 16   SpO2: 96%

## 2024-08-01 NOTE — ED NOTES
Pt resting in bed, in no apparent distress. Equal chest rise and fall noted.    h/o Schizophrenic disorder & depression

## 2024-08-01 NOTE — ED NOTES
"Bedside report received from off going RN/tech: Hudson, assumed care of patient.  POC discussed with patient. Call light within reach, all needs addressed at this time.       Fall risk interventions in place: Move the patient closer to the nurse's station, Patient's personal possessions are with in their safe reach, Place fall risk sign on patient's door, Keep floor surfaces clean and dry, and Accompanied to restroom (all applicable per [place] Fall risk assessment)   Continuous monitoring: Cardiac Leads, Pulse Ox, or Blood Pressure  IVF/IV medications: Not Applicable   Oxygen: Room Air  Bedside sitter: Not Applicable   Isolation: Not Applicable    BP (!) 143/64   Pulse (!) 56   Temp 36.7 °C (98 °F) (Temporal)   Resp 16   Ht 1.575 m (5' 2\")   Wt 59 kg (130 lb)   SpO2 92%  - RA  "

## 2024-08-01 NOTE — ED PROVIDER NOTES
ED Provider Note    CHIEF COMPLAINT  Chief Complaint   Patient presents with    T-5000 Head Injury     BIB [facility] from [facility] for GLF with etoh involvement. Pt was found outside [facility] by security bleeding from back of head. Bleeding controlled on arrival. + HS, unk LOC, - thinner.        EXTERNAL RECORDS REVIEWED  Inpatient Notes discharge summary on 12/22/2023 [place], hypotensive, elevated blood alcohol level    HPI/ROS  LIMITATION TO HISTORY   Select: Intoxication  OUTSIDE HISTORIAN(S):  EMS denies the patient is on anticoagulation    Lily Lloyd is a 89 y.o. female who presents with ground-level fall and head trauma.  Patient [place] with bleeding to the back of the head.  Patient was not unconscious.  Unknown LOC.  Patient is not on anticoagulation per her medication list.  Patient denies chest pain or shortness of breath.  Unknown if there was a precipitating event causing the fall.  Patient endorses headache.    PAST MEDICAL HISTORY   has a past medical history of Arthritis, Breath shortness, CATARACT, Diabetes, Heart burn, Hiatus hernia syndrome, Hypertension, Indigestion, Pain, Renal disorder, and Snoring.    SURGICAL HISTORY   has a past surgical history that includes nasal polypectomy (8/19/2010); ethmoidectomy (8/19/2010); antrostomy (8/19/2010); knee arthroplasty total; appendectomy; sinusotomies (12/20/2012); nasal polypectomy (12/20/2012); abdominal hysterectomy total; oophorectomy; cataract phaco with iol (8/6/2013); cataract phaco with iol (8/20/2013); and stevie holes (Left, 4/3/2023).    FAMILY HISTORY  Family History   Problem Relation Age of Onset    Cancer Sister         breast cancer    Cancer Paternal Aunt         breast cancer    Diabetes Father     Cancer Sister        SOCIAL HISTORY  Social History     Tobacco Use    Smoking status: Every Day     Current packs/day: 0.50     Average packs/day: 0.5  "packs/day for 50.0 years (25.0 ttl pk-yrs)     Types: Cigarettes    Smokeless tobacco: Current    Tobacco comments:     0.5 PPD X45 YRS   Vaping Use    Vaping status: Never Used   Substance and Sexual Activity    Alcohol use: Yes     Alcohol/week: 1.0 oz     Types: 2 Glasses of wine per week     Comment: socially    Drug use: No    Sexual activity: Not Currently       CURRENT MEDICATIONS  Home Medications       Reviewed by Cathy Williamson R.N. (Registered Nurse) on 07/31/24 at 2246  Med List Status: <None>     Medication Last Dose Status   diclofenac sodium (VOLTAREN) 1 % Gel  Active   fluticasone (FLONASE) 50 MCG/ACT nasal spray  Active   folic acid (FOLVITE) 1 MG Tab  Active   furosemide (LASIX) 40 MG Tab  Active   lisinopril (PRINIVIL) 10 MG Tab  Active   loratadine (CLARITIN) 10 MG Tab  Active   multivitamin Tab  Active   potassium chloride SA (KDUR) 20 MEQ Tab CR  Active   simvastatin (ZOCOR) 10 MG Tab  Active   thiamine (THIAMINE) 100 MG tablet  Active                  Audit from Redirected Encounters    **Home medications have not yet been reviewed for this encounter**         ALLERGIES  Allergies   Allergen Reactions    Pcn [Penicillins] Rash and Swelling    Sulfa Drugs Hives and Itching    Tape        PHYSICAL EXAM  VITAL SIGNS: /56   Pulse 60   Temp 36.7 °C (98 °F) (Temporal)   Resp 16   Ht 1.575 m (5' 2\")   Wt 59 kg (130 lb)   SpO2 91%   BMI 23.78 kg/m²    Vitals and nursing note reviewed.   Constitutional:       Comments: Patient is lying in bed supine, pleasant, conversant, speaking in complete sentences   HENT:   Occipital hematoma with laceration  Eyes:      Extraocular Movements: Extraocular movements intact.      Conjunctiva/sclera: Conjunctivae normal.      Pupils: Pupils are equal, round, and reactive to light.   Cardiovascular:      Pulses: Normal pulses.      Comments: HR 60  Pulmonary:      Effort: Pulmonary effort is normal. No respiratory distress.   Musculoskeletal:         " General: No swelling. Normal range of motion.      Cervical back: Normal range of motion. No rigidity.  No step-offs or deformities  Skin:     General: Skin is warm and dry.      Capillary Refill: Capillary refill takes less than 2 seconds.   Neurological:      Mental Status: Alert.       EKG/LABS  Prolonged QTc  I have independently interpreted this EKG    RADIOLOGY/PROCEDURES   I have independently interpreted the diagnostic imaging associated with this visit and am waiting the final reading from the radiologist.   My preliminary interpretation is as follows: No intracranial hemorrhage    Radiologist interpretation:  CT-CSPINE WITHOUT PLUS RECONS   Final Result         1.  Multilevel degenerative changes of the cervical spine limit diagnostic sensitivity of this examination, otherwise no acute traumatic bony injury of the cervical spine is apparent.   2.  Atherosclerosis      CT-HEAD W/O   Final Result         1.  No acute intracranial abnormality is identified, there are nonspecific white matter changes, commonly associated with small vessel ischemic disease.  Associated mild cerebral atrophy is noted.   2.  Small calcified mass abutting the anterior right pelvis, appearance most compatible with meningioma.   3.  Bilateral sinusitis changes   4.  Atherosclerosis.                   COURSE & MEDICAL DECISION MAKING    ASSESSMENT, COURSE AND PLAN  Care Narrative: Head without intracranial hemorrhage or skull fracture.  CT cervical spine without cervical spine fracture.  IV fluids for potential orthostasis.  EKG to evaluate for arrhythmia versus ischemia.  CBC to evaluate for acute anemia and leukocytosis.  CMP to evaluate for acute electrolyte abnormality, acute kidney injury, acute liver failure or dysfunction.  Unclear if patient had a precipitating cardiopulmonary event that caused the fall or whether she was just intoxicated and tripped and fell.  At this time patient moving all extremities, no focal neurologic  deficits at this time.    Electronically signed by: Frank Rodríguez M.D., 7/31/2024 11:03 PM    Patient has a history of hyponatremia, moderate hyponatremia at this time.  Patient will require close outpatient follow-up with repeat lab work.  Magnesium ordered for prolonged QTc.  CBC without anemia or leukocytosis.  No acute kidney injury.  After irrigation of the scalp and evaluation hematoma it does not appear that the patient is suffering from a laceration.  Patient still clinically intoxicated, will reevaluate and ambulate prior to discharge.    Electronically signed by: Frank Rodríguez M.D., 8/1/2024 4:27 AM    Patient ambulating without assistance. Patient is clinically sober at this time.    Repeat physical exam benign.  I doubt any serious emergency process at this time.  Patient and/or family, friends given strict return precautions for worsening symptoms and care instructions. They have demonstrated understanding of discharge instructions through teach back mechanism. Advised PCP follow-up in 1-2 days.  Patient/family/friend expresses understanding and agrees to plan.    This dictation has been created using voice recognition software. I am continuously working with the software to minimize the number of voice recognition errors and I have made every attempt to manually correct the errors within my dictation. However errors  related to this voice recognition software may still exist and should be interpreted within the appropriate context.     Electronically signed by: Frank Rodríguez M.D., 8/1/2024 5:28 AM        Hydration: Based on the patient's presentation of Dehydration the patient was given IV fluids. IV Hydration was used because oral hydration was not adequate alone. Upon recheck following hydration, the patient was improved.              FINAL DIAGNOSIS  1. Closed head injury, initial encounter    2. Hematoma of scalp, initial encounter    3. Prolonged Q-T interval on ECG    4.  Hyponatremia         Electronically signed by: Frank Rodríguez M.D., 7/31/2024 11:01 PM

## 2024-08-01 NOTE — ED NOTES
Pt refusing to use pure wick. Educated pt on risk of falls and pt reports she will only use the restroom. Cathy RN at bedside able to convince pt to use the commode. RN standing by for safety as pt uses commode.

## 2025-04-08 ENCOUNTER — APPOINTMENT (OUTPATIENT)
Dept: RADIOLOGY | Facility: MEDICAL CENTER | Age: 89
End: 2025-04-08
Attending: STUDENT IN AN ORGANIZED HEALTH CARE EDUCATION/TRAINING PROGRAM
Payer: MEDICARE

## 2025-04-08 ENCOUNTER — HOSPITAL ENCOUNTER (INPATIENT)
Facility: MEDICAL CENTER | Age: 89
LOS: 3 days | End: 2025-04-11
Attending: STUDENT IN AN ORGANIZED HEALTH CARE EDUCATION/TRAINING PROGRAM | Admitting: STUDENT IN AN ORGANIZED HEALTH CARE EDUCATION/TRAINING PROGRAM
Payer: MEDICARE

## 2025-04-08 DIAGNOSIS — F10.929 ALCOHOLIC INTOXICATION WITH COMPLICATION (HCC): ICD-10-CM

## 2025-04-08 DIAGNOSIS — S06.5XAA SDH (SUBDURAL HEMATOMA) (HCC): ICD-10-CM

## 2025-04-08 DIAGNOSIS — T14.90XA TRAUMA: Primary | ICD-10-CM

## 2025-04-08 DIAGNOSIS — R00.1 SINUS BRADYCARDIA: ICD-10-CM

## 2025-04-08 DIAGNOSIS — S52.531A CLOSED COLLES' FRACTURE OF RIGHT RADIUS, INITIAL ENCOUNTER: ICD-10-CM

## 2025-04-08 PROBLEM — S00.81XA: Status: ACTIVE | Noted: 2025-04-08

## 2025-04-08 PROBLEM — S62.101A RIGHT WRIST FRACTURE, CLOSED, INITIAL ENCOUNTER: Status: ACTIVE | Noted: 2025-04-08

## 2025-04-08 PROBLEM — M54.2 PAIN OF NECK WITH RECENT TRAUMATIC INJURY: Status: ACTIVE | Noted: 2025-04-08

## 2025-04-08 LAB
ALBUMIN SERPL BCP-MCNC: 3.5 G/DL (ref 3.2–4.9)
ALBUMIN/GLOB SERPL: 1.8 G/DL
ALP SERPL-CCNC: 79 U/L (ref 30–99)
ALT SERPL-CCNC: 15 U/L (ref 2–50)
ANION GAP SERPL CALC-SCNC: 13 MMOL/L (ref 7–16)
AST SERPL-CCNC: 28 U/L (ref 12–45)
BASOPHILS # BLD AUTO: 1.1 % (ref 0–1.8)
BASOPHILS # BLD: 0.07 K/UL (ref 0–0.12)
BILIRUB SERPL-MCNC: 0.4 MG/DL (ref 0.1–1.5)
BUN SERPL-MCNC: 23 MG/DL (ref 8–22)
CALCIUM ALBUM COR SERPL-MCNC: 8.4 MG/DL (ref 8.5–10.5)
CALCIUM SERPL-MCNC: 8 MG/DL (ref 8.5–10.5)
CFT BLD TEG: 4.4 MIN (ref 4.6–9.1)
CFT P HPASE BLD TEG: 4.1 MIN (ref 4.3–8.3)
CHLORIDE SERPL-SCNC: 99 MMOL/L (ref 96–112)
CLOT ANGLE BLD TEG: 75.3 DEGREES (ref 63–78)
CO2 SERPL-SCNC: 20 MMOL/L (ref 20–33)
CREAT SERPL-MCNC: 1.21 MG/DL (ref 0.5–1.4)
CT.EXTRINSIC BLD ROTEM: 1 MIN (ref 0.8–2.1)
EKG IMPRESSION: NORMAL
EOSINOPHIL # BLD AUTO: 0.34 K/UL (ref 0–0.51)
EOSINOPHIL NFR BLD: 5.2 % (ref 0–6.9)
ERYTHROCYTE [DISTWIDTH] IN BLOOD BY AUTOMATED COUNT: 47.1 FL (ref 35.9–50)
ETHANOL BLD-MCNC: 208 MG/DL
GFR SERPLBLD CREATININE-BSD FMLA CKD-EPI: 43 ML/MIN/1.73 M 2
GLOBULIN SER CALC-MCNC: 2 G/DL (ref 1.9–3.5)
GLUCOSE SERPL-MCNC: 103 MG/DL (ref 65–99)
HCT VFR BLD AUTO: 35.3 % (ref 37–47)
HGB BLD-MCNC: 12 G/DL (ref 12–16)
IMM GRANULOCYTES # BLD AUTO: 0.03 K/UL (ref 0–0.11)
IMM GRANULOCYTES NFR BLD AUTO: 0.5 % (ref 0–0.9)
LYMPHOCYTES # BLD AUTO: 2.44 K/UL (ref 1–4.8)
LYMPHOCYTES NFR BLD: 37.7 % (ref 22–41)
MAGNESIUM SERPL-MCNC: 2 MG/DL (ref 1.5–2.5)
MCF BLD TEG: 62 MM (ref 52–69)
MCF.PLATELET INHIB BLD ROTEM: 18.4 MM (ref 15–32)
MCH RBC QN AUTO: 29.9 PG (ref 27–33)
MCHC RBC AUTO-ENTMCNC: 34 G/DL (ref 32.2–35.5)
MCV RBC AUTO: 88 FL (ref 81.4–97.8)
MONOCYTES # BLD AUTO: 0.56 K/UL (ref 0–0.85)
MONOCYTES NFR BLD AUTO: 8.6 % (ref 0–13.4)
NEUTROPHILS # BLD AUTO: 3.04 K/UL (ref 1.82–7.42)
NEUTROPHILS NFR BLD: 46.9 % (ref 44–72)
NRBC # BLD AUTO: 0 K/UL
NRBC BLD-RTO: 0 /100 WBC (ref 0–0.2)
PA AA BLD-ACNC: 21.2 % (ref 0–11)
PA ADP BLD-ACNC: 60.9 % (ref 0–17)
PLATELET # BLD AUTO: 286 K/UL (ref 164–446)
PMV BLD AUTO: 9.4 FL (ref 9–12.9)
POTASSIUM SERPL-SCNC: 3.6 MMOL/L (ref 3.6–5.5)
PROT SERPL-MCNC: 5.5 G/DL (ref 6–8.2)
RBC # BLD AUTO: 4.01 M/UL (ref 4.2–5.4)
SODIUM SERPL-SCNC: 132 MMOL/L (ref 135–145)
TEG ALGORITHM TGALG: ABNORMAL
TSH SERPL DL<=0.005 MIU/L-ACNC: 4.94 UIU/ML (ref 0.38–5.33)
WBC # BLD AUTO: 6.5 K/UL (ref 4.8–10.8)

## 2025-04-08 PROCEDURE — 70450 CT HEAD/BRAIN W/O DYE: CPT

## 2025-04-08 PROCEDURE — 85347 COAGULATION TIME ACTIVATED: CPT

## 2025-04-08 PROCEDURE — 72125 CT NECK SPINE W/O DYE: CPT

## 2025-04-08 PROCEDURE — 73110 X-RAY EXAM OF WRIST: CPT | Mod: RT

## 2025-04-08 PROCEDURE — 80053 COMPREHEN METABOLIC PANEL: CPT

## 2025-04-08 PROCEDURE — 29125 APPL SHORT ARM SPLINT STATIC: CPT

## 2025-04-08 PROCEDURE — 85025 COMPLETE CBC W/AUTO DIFF WBC: CPT

## 2025-04-08 PROCEDURE — 84443 ASSAY THYROID STIM HORMONE: CPT

## 2025-04-08 PROCEDURE — 82077 ASSAY SPEC XCP UR&BREATH IA: CPT

## 2025-04-08 PROCEDURE — 99223 1ST HOSP IP/OBS HIGH 75: CPT | Mod: AI | Performed by: STUDENT IN AN ORGANIZED HEALTH CARE EDUCATION/TRAINING PROGRAM

## 2025-04-08 PROCEDURE — 83735 ASSAY OF MAGNESIUM: CPT

## 2025-04-08 PROCEDURE — 99291 CRITICAL CARE FIRST HOUR: CPT

## 2025-04-08 PROCEDURE — 0PSHXZZ REPOSITION RIGHT RADIUS, EXTERNAL APPROACH: ICD-10-PCS | Performed by: STUDENT IN AN ORGANIZED HEALTH CARE EDUCATION/TRAINING PROGRAM

## 2025-04-08 PROCEDURE — 302874 HCHG BANDAGE ACE 2 OR 3""

## 2025-04-08 PROCEDURE — 85384 FIBRINOGEN ACTIVITY: CPT

## 2025-04-08 PROCEDURE — 700111 HCHG RX REV CODE 636 W/ 250 OVERRIDE (IP): Performed by: STUDENT IN AN ORGANIZED HEALTH CARE EDUCATION/TRAINING PROGRAM

## 2025-04-08 PROCEDURE — 85576 BLOOD PLATELET AGGREGATION: CPT

## 2025-04-08 PROCEDURE — 93005 ELECTROCARDIOGRAM TRACING: CPT | Mod: TC | Performed by: STUDENT IN AN ORGANIZED HEALTH CARE EDUCATION/TRAINING PROGRAM

## 2025-04-08 PROCEDURE — 36415 COLL VENOUS BLD VENIPUNCTURE: CPT

## 2025-04-08 PROCEDURE — 770022 HCHG ROOM/CARE - ICU (200)

## 2025-04-08 PROCEDURE — 25605 CLTX DST RDL FX/EPHYS SEP W/: CPT

## 2025-04-08 RX ORDER — AMOXICILLIN 250 MG
1 CAPSULE ORAL NIGHTLY
Status: DISCONTINUED | OUTPATIENT
Start: 2025-04-08 | End: 2025-04-11 | Stop reason: HOSPADM

## 2025-04-08 RX ORDER — DOXYCYCLINE HYCLATE 100 MG
100 TABLET ORAL 2 TIMES DAILY
Status: ON HOLD | COMMUNITY
End: 2025-04-11

## 2025-04-08 RX ORDER — ONDANSETRON 4 MG/1
4 TABLET, ORALLY DISINTEGRATING ORAL EVERY 4 HOURS PRN
Status: DISCONTINUED | OUTPATIENT
Start: 2025-04-08 | End: 2025-04-11 | Stop reason: HOSPADM

## 2025-04-08 RX ORDER — OXYCODONE HYDROCHLORIDE 5 MG/1
5 TABLET ORAL
Refills: 0 | Status: DISCONTINUED | OUTPATIENT
Start: 2025-04-08 | End: 2025-04-11 | Stop reason: HOSPADM

## 2025-04-08 RX ORDER — LORAZEPAM 2 MG/1
2 TABLET ORAL
Status: DISCONTINUED | OUTPATIENT
Start: 2025-04-08 | End: 2025-04-10

## 2025-04-08 RX ORDER — METAXALONE 800 MG/1
800 TABLET ORAL 3 TIMES DAILY
Status: DISCONTINUED | OUTPATIENT
Start: 2025-04-09 | End: 2025-04-08

## 2025-04-08 RX ORDER — HYDRALAZINE HYDROCHLORIDE 20 MG/ML
10 INJECTION INTRAMUSCULAR; INTRAVENOUS EVERY 4 HOURS PRN
Status: DISCONTINUED | OUTPATIENT
Start: 2025-04-08 | End: 2025-04-11 | Stop reason: HOSPADM

## 2025-04-08 RX ORDER — SODIUM PHOSPHATE,MONO-DIBASIC 19G-7G/118
1 ENEMA (ML) RECTAL
Status: DISCONTINUED | OUTPATIENT
Start: 2025-04-08 | End: 2025-04-11 | Stop reason: HOSPADM

## 2025-04-08 RX ORDER — GAUZE BANDAGE 2" X 2"
100 BANDAGE TOPICAL DAILY
Status: DISCONTINUED | OUTPATIENT
Start: 2025-04-09 | End: 2025-04-11 | Stop reason: HOSPADM

## 2025-04-08 RX ORDER — POLYETHYLENE GLYCOL 3350 17 G/17G
1 POWDER, FOR SOLUTION ORAL 2 TIMES DAILY
Status: DISCONTINUED | OUTPATIENT
Start: 2025-04-08 | End: 2025-04-11 | Stop reason: HOSPADM

## 2025-04-08 RX ORDER — LEVETIRACETAM 500 MG/5ML
500 INJECTION, SOLUTION, CONCENTRATE INTRAVENOUS EVERY 12 HOURS
Status: DISCONTINUED | OUTPATIENT
Start: 2025-04-08 | End: 2025-04-11 | Stop reason: HOSPADM

## 2025-04-08 RX ORDER — LORAZEPAM 1 MG/1
0.5 TABLET ORAL EVERY 4 HOURS PRN
Status: DISCONTINUED | OUTPATIENT
Start: 2025-04-08 | End: 2025-04-10

## 2025-04-08 RX ORDER — OXYCODONE HYDROCHLORIDE 5 MG/1
2.5 TABLET ORAL
Refills: 0 | Status: DISCONTINUED | OUTPATIENT
Start: 2025-04-08 | End: 2025-04-11 | Stop reason: HOSPADM

## 2025-04-08 RX ORDER — DIAZEPAM 10 MG/2ML
10 INJECTION, SOLUTION INTRAMUSCULAR; INTRAVENOUS
Status: DISCONTINUED | OUTPATIENT
Start: 2025-04-08 | End: 2025-04-10

## 2025-04-08 RX ORDER — INSULIN LISPRO 100 [IU]/ML
1-6 INJECTION, SOLUTION INTRAVENOUS; SUBCUTANEOUS EVERY 6 HOURS
Status: DISCONTINUED | OUTPATIENT
Start: 2025-04-09 | End: 2025-04-10

## 2025-04-08 RX ORDER — ACETAMINOPHEN 500 MG
1000 TABLET ORAL EVERY 6 HOURS
Status: DISCONTINUED | OUTPATIENT
Start: 2025-04-09 | End: 2025-04-11 | Stop reason: HOSPADM

## 2025-04-08 RX ORDER — LEVETIRACETAM 500 MG/1
500 TABLET ORAL EVERY 12 HOURS
Status: DISCONTINUED | OUTPATIENT
Start: 2025-04-08 | End: 2025-04-11 | Stop reason: HOSPADM

## 2025-04-08 RX ORDER — FOLIC ACID 1 MG/1
1 TABLET ORAL DAILY
Status: DISCONTINUED | OUTPATIENT
Start: 2025-04-09 | End: 2025-04-11 | Stop reason: HOSPADM

## 2025-04-08 RX ORDER — BACITRACIN ZINC AND POLYMYXIN B SULFATE 500; 1000 [USP'U]/G; [USP'U]/G
OINTMENT TOPICAL 3 TIMES DAILY
Status: DISCONTINUED | OUTPATIENT
Start: 2025-04-09 | End: 2025-04-11 | Stop reason: HOSPADM

## 2025-04-08 RX ORDER — METAXALONE 800 MG/1
400 TABLET ORAL 2 TIMES DAILY
Status: DISCONTINUED | OUTPATIENT
Start: 2025-04-09 | End: 2025-04-11 | Stop reason: HOSPADM

## 2025-04-08 RX ORDER — LORAZEPAM 1 MG/1
1 TABLET ORAL EVERY 4 HOURS PRN
Status: DISCONTINUED | OUTPATIENT
Start: 2025-04-08 | End: 2025-04-10

## 2025-04-08 RX ORDER — BUPIVACAINE HYDROCHLORIDE 5 MG/ML
10 INJECTION, SOLUTION EPIDURAL; INTRACAUDAL; PERINEURAL ONCE
Status: COMPLETED | OUTPATIENT
Start: 2025-04-08 | End: 2025-04-08

## 2025-04-08 RX ORDER — AMOXICILLIN 250 MG
1 CAPSULE ORAL
Status: DISCONTINUED | OUTPATIENT
Start: 2025-04-08 | End: 2025-04-11 | Stop reason: HOSPADM

## 2025-04-08 RX ORDER — SODIUM CHLORIDE 9 MG/ML
INJECTION, SOLUTION INTRAVENOUS CONTINUOUS
Status: DISCONTINUED | OUTPATIENT
Start: 2025-04-08 | End: 2025-04-10

## 2025-04-08 RX ORDER — DOCUSATE SODIUM 100 MG/1
100 CAPSULE, LIQUID FILLED ORAL 2 TIMES DAILY
Status: DISCONTINUED | OUTPATIENT
Start: 2025-04-08 | End: 2025-04-11 | Stop reason: HOSPADM

## 2025-04-08 RX ORDER — LORAZEPAM 2 MG/1
4 TABLET ORAL
Status: DISCONTINUED | OUTPATIENT
Start: 2025-04-08 | End: 2025-04-10

## 2025-04-08 RX ORDER — GABAPENTIN 100 MG/1
100 CAPSULE ORAL EVERY 8 HOURS
Status: DISCONTINUED | OUTPATIENT
Start: 2025-04-08 | End: 2025-04-11 | Stop reason: HOSPADM

## 2025-04-08 RX ORDER — ACETAMINOPHEN 500 MG
1000 TABLET ORAL EVERY 6 HOURS PRN
Status: DISCONTINUED | OUTPATIENT
Start: 2025-04-14 | End: 2025-04-11 | Stop reason: HOSPADM

## 2025-04-08 RX ORDER — BISACODYL 10 MG
10 SUPPOSITORY, RECTAL RECTAL
Status: DISCONTINUED | OUTPATIENT
Start: 2025-04-08 | End: 2025-04-11 | Stop reason: HOSPADM

## 2025-04-08 RX ORDER — NICOTINE 21 MG/24HR
14 PATCH, TRANSDERMAL 24 HOURS TRANSDERMAL
Status: DISCONTINUED | OUTPATIENT
Start: 2025-04-09 | End: 2025-04-11 | Stop reason: HOSPADM

## 2025-04-08 RX ORDER — ONDANSETRON 2 MG/ML
4 INJECTION INTRAMUSCULAR; INTRAVENOUS EVERY 4 HOURS PRN
Status: DISCONTINUED | OUTPATIENT
Start: 2025-04-08 | End: 2025-04-11 | Stop reason: HOSPADM

## 2025-04-08 RX ORDER — HYDROMORPHONE HYDROCHLORIDE 1 MG/ML
0.25 INJECTION, SOLUTION INTRAMUSCULAR; INTRAVENOUS; SUBCUTANEOUS
Status: DISCONTINUED | OUTPATIENT
Start: 2025-04-08 | End: 2025-04-10

## 2025-04-08 RX ORDER — DEXTROSE MONOHYDRATE 25 G/50ML
25 INJECTION, SOLUTION INTRAVENOUS
Status: DISCONTINUED | OUTPATIENT
Start: 2025-04-08 | End: 2025-04-10

## 2025-04-08 RX ADMIN — BUPIVACAINE HYDROCHLORIDE 10 ML: 5 INJECTION, SOLUTION EPIDURAL; INTRACAUDAL at 21:45

## 2025-04-08 ASSESSMENT — FIBROSIS 4 INDEX: FIB4 SCORE: 2.39

## 2025-04-09 ENCOUNTER — APPOINTMENT (OUTPATIENT)
Dept: RADIOLOGY | Facility: MEDICAL CENTER | Age: 89
End: 2025-04-09
Payer: MEDICARE

## 2025-04-09 ENCOUNTER — ANESTHESIA EVENT (OUTPATIENT)
Dept: SURGERY | Facility: MEDICAL CENTER | Age: 89
End: 2025-04-09
Payer: MEDICARE

## 2025-04-09 ENCOUNTER — ANESTHESIA (OUTPATIENT)
Dept: SURGERY | Facility: MEDICAL CENTER | Age: 89
End: 2025-04-09
Payer: MEDICARE

## 2025-04-09 ENCOUNTER — APPOINTMENT (OUTPATIENT)
Dept: RADIOLOGY | Facility: MEDICAL CENTER | Age: 89
End: 2025-04-09
Attending: ORTHOPAEDIC SURGERY
Payer: MEDICARE

## 2025-04-09 ENCOUNTER — HOSPITAL ENCOUNTER (OUTPATIENT)
Dept: RADIOLOGY | Facility: MEDICAL CENTER | Age: 89
End: 2025-04-09
Payer: MEDICARE

## 2025-04-09 ENCOUNTER — HOSPITAL ENCOUNTER (OUTPATIENT)
Dept: RADIOLOGY | Facility: MEDICAL CENTER | Age: 89
End: 2025-04-09
Attending: STUDENT IN AN ORGANIZED HEALTH CARE EDUCATION/TRAINING PROGRAM
Payer: MEDICARE

## 2025-04-09 PROBLEM — M79.604 LOWER EXTREMITY PAIN, BILATERAL: Status: ACTIVE | Noted: 2025-04-09

## 2025-04-09 PROBLEM — M54.6 THORACIC BACK PAIN: Status: ACTIVE | Noted: 2025-04-09

## 2025-04-09 PROBLEM — R60.0 BILATERAL LOWER EXTREMITY EDEMA: Status: ACTIVE | Noted: 2025-04-09

## 2025-04-09 PROBLEM — M79.605 LOWER EXTREMITY PAIN, BILATERAL: Status: ACTIVE | Noted: 2025-04-09

## 2025-04-09 PROBLEM — R29.6 FREQUENT FALLS: Status: ACTIVE | Noted: 2025-04-09

## 2025-04-09 PROBLEM — I87.2 STASIS DERMATITIS OF BOTH LEGS: Status: ACTIVE | Noted: 2025-04-09

## 2025-04-09 PROBLEM — Z01.89 ENCOUNTER FOR GERIATRIC ASSESSMENT: Status: ACTIVE | Noted: 2025-04-09

## 2025-04-09 LAB
ALBUMIN SERPL BCP-MCNC: 3.3 G/DL (ref 3.2–4.9)
ALBUMIN/GLOB SERPL: 1.5 G/DL
ALP SERPL-CCNC: 83 U/L (ref 30–99)
ALT SERPL-CCNC: 14 U/L (ref 2–50)
ANION GAP SERPL CALC-SCNC: 11 MMOL/L (ref 7–16)
AST SERPL-CCNC: 33 U/L (ref 12–45)
BASOPHILS # BLD AUTO: 0.3 % (ref 0–1.8)
BASOPHILS # BLD: 0.03 K/UL (ref 0–0.12)
BILIRUB SERPL-MCNC: <0.2 MG/DL (ref 0.1–1.5)
BUN SERPL-MCNC: 19 MG/DL (ref 8–22)
CALCIUM ALBUM COR SERPL-MCNC: 8.4 MG/DL (ref 8.5–10.5)
CALCIUM SERPL-MCNC: 7.8 MG/DL (ref 8.5–10.5)
CHLORIDE SERPL-SCNC: 103 MMOL/L (ref 96–112)
CO2 SERPL-SCNC: 19 MMOL/L (ref 20–33)
CREAT SERPL-MCNC: 1 MG/DL (ref 0.5–1.4)
EOSINOPHIL # BLD AUTO: 0.03 K/UL (ref 0–0.51)
EOSINOPHIL NFR BLD: 0.3 % (ref 0–6.9)
ERYTHROCYTE [DISTWIDTH] IN BLOOD BY AUTOMATED COUNT: 46.2 FL (ref 35.9–50)
GFR SERPLBLD CREATININE-BSD FMLA CKD-EPI: 54 ML/MIN/1.73 M 2
GLOBULIN SER CALC-MCNC: 2.2 G/DL (ref 1.9–3.5)
GLUCOSE BLD STRIP.AUTO-MCNC: 102 MG/DL (ref 65–99)
GLUCOSE BLD STRIP.AUTO-MCNC: 180 MG/DL (ref 65–99)
GLUCOSE BLD STRIP.AUTO-MCNC: 198 MG/DL (ref 65–99)
GLUCOSE SERPL-MCNC: 99 MG/DL (ref 65–99)
HCT VFR BLD AUTO: 35.3 % (ref 37–47)
HGB BLD-MCNC: 11.5 G/DL (ref 12–16)
IMM GRANULOCYTES # BLD AUTO: 0.05 K/UL (ref 0–0.11)
IMM GRANULOCYTES NFR BLD AUTO: 0.5 % (ref 0–0.9)
INR PPP: 1.07 (ref 0.87–1.13)
LYMPHOCYTES # BLD AUTO: 1.07 K/UL (ref 1–4.8)
LYMPHOCYTES NFR BLD: 11.5 % (ref 22–41)
MCH RBC QN AUTO: 28.8 PG (ref 27–33)
MCHC RBC AUTO-ENTMCNC: 32.6 G/DL (ref 32.2–35.5)
MCV RBC AUTO: 88.5 FL (ref 81.4–97.8)
MONOCYTES # BLD AUTO: 0.48 K/UL (ref 0–0.85)
MONOCYTES NFR BLD AUTO: 5.2 % (ref 0–13.4)
NEUTROPHILS # BLD AUTO: 7.62 K/UL (ref 1.82–7.42)
NEUTROPHILS NFR BLD: 82.2 % (ref 44–72)
NRBC # BLD AUTO: 0 K/UL
NRBC BLD-RTO: 0 /100 WBC (ref 0–0.2)
PLATELET # BLD AUTO: 276 K/UL (ref 164–446)
PMV BLD AUTO: 9.9 FL (ref 9–12.9)
POTASSIUM SERPL-SCNC: 4 MMOL/L (ref 3.6–5.5)
PROT SERPL-MCNC: 5.5 G/DL (ref 6–8.2)
PROTHROMBIN TIME: 13.9 SEC (ref 12–14.6)
RBC # BLD AUTO: 3.99 M/UL (ref 4.2–5.4)
SODIUM SERPL-SCNC: 133 MMOL/L (ref 135–145)
UFH PPP CHRO-ACNC: <0.1 IU/ML
WBC # BLD AUTO: 9.3 K/UL (ref 4.8–10.8)

## 2025-04-09 PROCEDURE — 93970 EXTREMITY STUDY: CPT

## 2025-04-09 PROCEDURE — 700111 HCHG RX REV CODE 636 W/ 250 OVERRIDE (IP): Mod: JZ

## 2025-04-09 PROCEDURE — A9270 NON-COVERED ITEM OR SERVICE: HCPCS

## 2025-04-09 PROCEDURE — 700102 HCHG RX REV CODE 250 W/ 637 OVERRIDE(OP)

## 2025-04-09 PROCEDURE — 502000 HCHG MISC OR IMPLANTS RC 0278: Performed by: ORTHOPAEDIC SURGERY

## 2025-04-09 PROCEDURE — 700111 HCHG RX REV CODE 636 W/ 250 OVERRIDE (IP): Performed by: ANESTHESIOLOGY

## 2025-04-09 PROCEDURE — 160015 HCHG STAT PREOP MINUTES: Performed by: ORTHOPAEDIC SURGERY

## 2025-04-09 PROCEDURE — C1713 ANCHOR/SCREW BN/BN,TIS/BN: HCPCS | Performed by: ORTHOPAEDIC SURGERY

## 2025-04-09 PROCEDURE — 25607 OPTX DST RD XARTC FX/EPI SEP: CPT | Mod: RT | Performed by: ORTHOPAEDIC SURGERY

## 2025-04-09 PROCEDURE — 93970 EXTREMITY STUDY: CPT | Mod: 26 | Performed by: INTERNAL MEDICINE

## 2025-04-09 PROCEDURE — 99222 1ST HOSP IP/OBS MODERATE 55: CPT | Mod: 57 | Performed by: ORTHOPAEDIC SURGERY

## 2025-04-09 PROCEDURE — 72070 X-RAY EXAM THORAC SPINE 2VWS: CPT

## 2025-04-09 PROCEDURE — 700105 HCHG RX REV CODE 258: Performed by: ANESTHESIOLOGY

## 2025-04-09 PROCEDURE — 0PSH04Z REPOSITION RIGHT RADIUS WITH INTERNAL FIXATION DEVICE, OPEN APPROACH: ICD-10-PCS | Performed by: ORTHOPAEDIC SURGERY

## 2025-04-09 PROCEDURE — 770022 HCHG ROOM/CARE - ICU (200)

## 2025-04-09 PROCEDURE — 82962 GLUCOSE BLOOD TEST: CPT

## 2025-04-09 PROCEDURE — 700111 HCHG RX REV CODE 636 W/ 250 OVERRIDE (IP): Mod: JZ | Performed by: ANESTHESIOLOGY

## 2025-04-09 PROCEDURE — 85610 PROTHROMBIN TIME: CPT

## 2025-04-09 PROCEDURE — 700101 HCHG RX REV CODE 250: Performed by: ORTHOPAEDIC SURGERY

## 2025-04-09 PROCEDURE — 72100 X-RAY EXAM L-S SPINE 2/3 VWS: CPT

## 2025-04-09 PROCEDURE — 73110 X-RAY EXAM OF WRIST: CPT | Mod: RT

## 2025-04-09 PROCEDURE — 70486 CT MAXILLOFACIAL W/O DYE: CPT

## 2025-04-09 PROCEDURE — 700102 HCHG RX REV CODE 250 W/ 637 OVERRIDE(OP): Performed by: ANESTHESIOLOGY

## 2025-04-09 PROCEDURE — 92523 SPEECH SOUND LANG COMPREHEN: CPT

## 2025-04-09 PROCEDURE — 70450 CT HEAD/BRAIN W/O DYE: CPT

## 2025-04-09 PROCEDURE — 160039 HCHG SURGERY MINUTES - EA ADDL 1 MIN LEVEL 3: Performed by: ORTHOPAEDIC SURGERY

## 2025-04-09 PROCEDURE — 25607 OPTX DST RD XARTC FX/EPI SEP: CPT | Mod: 80ROC,RT | Performed by: STUDENT IN AN ORGANIZED HEALTH CARE EDUCATION/TRAINING PROGRAM

## 2025-04-09 PROCEDURE — 160048 HCHG OR STATISTICAL LEVEL 1-5: Performed by: ORTHOPAEDIC SURGERY

## 2025-04-09 PROCEDURE — 99222 1ST HOSP IP/OBS MODERATE 55: CPT | Performed by: FAMILY MEDICINE

## 2025-04-09 PROCEDURE — 160035 HCHG PACU - 1ST 60 MINS PHASE I: Performed by: ORTHOPAEDIC SURGERY

## 2025-04-09 PROCEDURE — 99233 SBSQ HOSP IP/OBS HIGH 50: CPT | Performed by: SURGERY

## 2025-04-09 PROCEDURE — 160028 HCHG SURGERY MINUTES - 1ST 30 MINS LEVEL 3: Performed by: ORTHOPAEDIC SURGERY

## 2025-04-09 PROCEDURE — 700105 HCHG RX REV CODE 258

## 2025-04-09 PROCEDURE — 700101 HCHG RX REV CODE 250: Performed by: ANESTHESIOLOGY

## 2025-04-09 PROCEDURE — 73590 X-RAY EXAM OF LOWER LEG: CPT | Mod: RT

## 2025-04-09 PROCEDURE — 160002 HCHG RECOVERY MINUTES (STAT): Performed by: ORTHOPAEDIC SURGERY

## 2025-04-09 PROCEDURE — 700101 HCHG RX REV CODE 250

## 2025-04-09 PROCEDURE — 80053 COMPREHEN METABOLIC PANEL: CPT

## 2025-04-09 PROCEDURE — 85025 COMPLETE CBC W/AUTO DIFF WBC: CPT

## 2025-04-09 PROCEDURE — 160009 HCHG ANES TIME/MIN: Performed by: ORTHOPAEDIC SURGERY

## 2025-04-09 PROCEDURE — A9270 NON-COVERED ITEM OR SERVICE: HCPCS | Performed by: ANESTHESIOLOGY

## 2025-04-09 PROCEDURE — 73590 X-RAY EXAM OF LOWER LEG: CPT | Mod: LT

## 2025-04-09 PROCEDURE — 85520 HEPARIN ASSAY: CPT

## 2025-04-09 DEVICE — SCREW BONE VARIAX T8 FULL THREAD L14 MM OD2.7 MM FOOT ANKLE LOCK STARDRIVE NONSTERILE: Type: IMPLANTABLE DEVICE | Site: WRIST | Status: FUNCTIONAL

## 2025-04-09 DEVICE — IMPLANTABLE DEVICE: Type: IMPLANTABLE DEVICE | Site: WRIST | Status: FUNCTIONAL

## 2025-04-09 DEVICE — SCREW BONE T8 FULL THREAD L16 MM OD2.7 MM STARDRIVE NONSTERILE VARIAX FOOT PLATE SYSTEM: Type: IMPLANTABLE DEVICE | Site: WRIST | Status: FUNCTIONAL

## 2025-04-09 DEVICE — SCREW BONE VARIAX T8 FULL THREAD L18 MM OD2.7 MM FOOT ANKLE LOCK STARDRIVE NONSTERILE: Type: IMPLANTABLE DEVICE | Site: WRIST | Status: FUNCTIONAL

## 2025-04-09 DEVICE — SCREW BONE VARIAX T8 FULL THREAD L14 MM OD2.7 MM FOOT ANKLE STARDRIVE NONSTERILE: Type: IMPLANTABLE DEVICE | Site: WRIST | Status: FUNCTIONAL

## 2025-04-09 DEVICE — SCREW BONE VARIAX T8 FULL THREAD L16 MM OD2.7 MM FOOT ANKLE LOCK STARDRIVE NONSTERILE: Type: IMPLANTABLE DEVICE | Site: WRIST | Status: FUNCTIONAL

## 2025-04-09 DEVICE — SCREW BONE VARIAX T8 FULL THREAD L20 MM OD2.7 MM FOOT ANKLE LOCK STARDRIVE NONSTERILE: Type: IMPLANTABLE DEVICE | Site: WRIST | Status: FUNCTIONAL

## 2025-04-09 RX ORDER — MIDAZOLAM HYDROCHLORIDE 1 MG/ML
1 INJECTION INTRAMUSCULAR; INTRAVENOUS
Status: DISCONTINUED | OUTPATIENT
Start: 2025-04-09 | End: 2025-04-09 | Stop reason: HOSPADM

## 2025-04-09 RX ORDER — SIMVASTATIN 20 MG
10 TABLET ORAL EVERY EVENING
Status: DISCONTINUED | OUTPATIENT
Start: 2025-04-09 | End: 2025-04-11 | Stop reason: HOSPADM

## 2025-04-09 RX ORDER — HYDROMORPHONE HYDROCHLORIDE 1 MG/ML
0.4 INJECTION, SOLUTION INTRAMUSCULAR; INTRAVENOUS; SUBCUTANEOUS
Status: DISCONTINUED | OUTPATIENT
Start: 2025-04-09 | End: 2025-04-09 | Stop reason: HOSPADM

## 2025-04-09 RX ORDER — DIPHENHYDRAMINE HYDROCHLORIDE 50 MG/ML
12.5 INJECTION, SOLUTION INTRAMUSCULAR; INTRAVENOUS
Status: DISCONTINUED | OUTPATIENT
Start: 2025-04-09 | End: 2025-04-09 | Stop reason: HOSPADM

## 2025-04-09 RX ORDER — ONDANSETRON 2 MG/ML
4 INJECTION INTRAMUSCULAR; INTRAVENOUS
Status: DISCONTINUED | OUTPATIENT
Start: 2025-04-09 | End: 2025-04-09 | Stop reason: HOSPADM

## 2025-04-09 RX ORDER — HYDROMORPHONE HYDROCHLORIDE 1 MG/ML
0.2 INJECTION, SOLUTION INTRAMUSCULAR; INTRAVENOUS; SUBCUTANEOUS
Status: DISCONTINUED | OUTPATIENT
Start: 2025-04-09 | End: 2025-04-09 | Stop reason: HOSPADM

## 2025-04-09 RX ORDER — MEPERIDINE HYDROCHLORIDE 25 MG/ML
12.5 INJECTION INTRAMUSCULAR; INTRAVENOUS; SUBCUTANEOUS
Status: DISCONTINUED | OUTPATIENT
Start: 2025-04-09 | End: 2025-04-09 | Stop reason: HOSPADM

## 2025-04-09 RX ORDER — OXYCODONE HCL 5 MG/5 ML
10 SOLUTION, ORAL ORAL
Status: COMPLETED | OUTPATIENT
Start: 2025-04-09 | End: 2025-04-09

## 2025-04-09 RX ORDER — HALOPERIDOL 5 MG/ML
1 INJECTION INTRAMUSCULAR
Status: DISCONTINUED | OUTPATIENT
Start: 2025-04-09 | End: 2025-04-09 | Stop reason: HOSPADM

## 2025-04-09 RX ORDER — MAGNESIUM HYDROXIDE 1200 MG/15ML
LIQUID ORAL
Status: COMPLETED | OUTPATIENT
Start: 2025-04-09 | End: 2025-04-09

## 2025-04-09 RX ORDER — ONDANSETRON 2 MG/ML
INJECTION INTRAMUSCULAR; INTRAVENOUS PRN
Status: DISCONTINUED | OUTPATIENT
Start: 2025-04-09 | End: 2025-04-09 | Stop reason: SURG

## 2025-04-09 RX ORDER — ROCURONIUM BROMIDE 10 MG/ML
INJECTION, SOLUTION INTRAVENOUS PRN
Status: DISCONTINUED | OUTPATIENT
Start: 2025-04-09 | End: 2025-04-09 | Stop reason: SURG

## 2025-04-09 RX ORDER — HYDROMORPHONE HYDROCHLORIDE 1 MG/ML
1 INJECTION, SOLUTION INTRAMUSCULAR; INTRAVENOUS; SUBCUTANEOUS
Status: DISCONTINUED | OUTPATIENT
Start: 2025-04-09 | End: 2025-04-09 | Stop reason: HOSPADM

## 2025-04-09 RX ORDER — LISINOPRIL 10 MG/1
10 TABLET ORAL DAILY
Status: DISCONTINUED | OUTPATIENT
Start: 2025-04-09 | End: 2025-04-11 | Stop reason: HOSPADM

## 2025-04-09 RX ORDER — CEFAZOLIN SODIUM 1 G/3ML
INJECTION, POWDER, FOR SOLUTION INTRAMUSCULAR; INTRAVENOUS PRN
Status: DISCONTINUED | OUTPATIENT
Start: 2025-04-09 | End: 2025-04-09 | Stop reason: SURG

## 2025-04-09 RX ORDER — IPRATROPIUM BROMIDE AND ALBUTEROL SULFATE 2.5; .5 MG/3ML; MG/3ML
3 SOLUTION RESPIRATORY (INHALATION)
Status: DISCONTINUED | OUTPATIENT
Start: 2025-04-09 | End: 2025-04-09 | Stop reason: HOSPADM

## 2025-04-09 RX ORDER — LIDOCAINE HYDROCHLORIDE 20 MG/ML
INJECTION, SOLUTION EPIDURAL; INFILTRATION; INTRACAUDAL; PERINEURAL PRN
Status: DISCONTINUED | OUTPATIENT
Start: 2025-04-09 | End: 2025-04-09 | Stop reason: SURG

## 2025-04-09 RX ORDER — OXYCODONE HCL 5 MG/5 ML
5 SOLUTION, ORAL ORAL
Status: COMPLETED | OUTPATIENT
Start: 2025-04-09 | End: 2025-04-09

## 2025-04-09 RX ORDER — SODIUM CHLORIDE, SODIUM LACTATE, POTASSIUM CHLORIDE, CALCIUM CHLORIDE 600; 310; 30; 20 MG/100ML; MG/100ML; MG/100ML; MG/100ML
INJECTION, SOLUTION INTRAVENOUS
Status: DISCONTINUED | OUTPATIENT
Start: 2025-04-09 | End: 2025-04-09 | Stop reason: SURG

## 2025-04-09 RX ORDER — DEXAMETHASONE SODIUM PHOSPHATE 4 MG/ML
INJECTION, SOLUTION INTRA-ARTICULAR; INTRALESIONAL; INTRAMUSCULAR; INTRAVENOUS; SOFT TISSUE PRN
Status: DISCONTINUED | OUTPATIENT
Start: 2025-04-09 | End: 2025-04-09 | Stop reason: SURG

## 2025-04-09 RX ADMIN — ONDANSETRON 4 MG: 2 INJECTION INTRAMUSCULAR; INTRAVENOUS at 12:57

## 2025-04-09 RX ADMIN — Medication: at 18:00

## 2025-04-09 RX ADMIN — ROCURONIUM BROMIDE 50 MG: 10 INJECTION INTRAVENOUS at 12:26

## 2025-04-09 RX ADMIN — NICOTINE 14 MG: 14 PATCH TRANSDERMAL at 05:54

## 2025-04-09 RX ADMIN — LIDOCAINE HYDROCHLORIDE 40 MG: 20 INJECTION, SOLUTION EPIDURAL; INFILTRATION; INTRACAUDAL; PERINEURAL at 12:26

## 2025-04-09 RX ADMIN — FENTANYL CITRATE 25 MCG: 50 INJECTION, SOLUTION INTRAMUSCULAR; INTRAVENOUS at 13:46

## 2025-04-09 RX ADMIN — PROPOFOL 100 MG: 10 INJECTION, EMULSION INTRAVENOUS at 12:26

## 2025-04-09 RX ADMIN — DEXAMETHASONE SODIUM PHOSPHATE 8 MG: 4 INJECTION INTRA-ARTICULAR; INTRALESIONAL; INTRAMUSCULAR; INTRAVENOUS; SOFT TISSUE at 12:29

## 2025-04-09 RX ADMIN — CEFAZOLIN 2 G: 1 INJECTION, POWDER, FOR SOLUTION INTRAMUSCULAR; INTRAVENOUS at 12:26

## 2025-04-09 RX ADMIN — SODIUM CHLORIDE: 9 INJECTION, SOLUTION INTRAVENOUS at 01:11

## 2025-04-09 RX ADMIN — ACETAMINOPHEN 1000 MG: 500 TABLET, FILM COATED ORAL at 05:53

## 2025-04-09 RX ADMIN — METAXALONE 400 MG: 800 TABLET ORAL at 05:54

## 2025-04-09 RX ADMIN — PROPOFOL 150 MCG/KG/MIN: 10 INJECTION, EMULSION INTRAVENOUS at 12:30

## 2025-04-09 RX ADMIN — THERA TABS 1 TABLET: TAB at 05:53

## 2025-04-09 RX ADMIN — ACETAMINOPHEN 1000 MG: 500 TABLET, FILM COATED ORAL at 18:48

## 2025-04-09 RX ADMIN — SUGAMMADEX 200 MG: 100 INJECTION, SOLUTION INTRAVENOUS at 12:57

## 2025-04-09 RX ADMIN — DOCUSATE SODIUM 100 MG: 100 CAPSULE, LIQUID FILLED ORAL at 18:48

## 2025-04-09 RX ADMIN — LISINOPRIL 10 MG: 10 TABLET ORAL at 18:48

## 2025-04-09 RX ADMIN — FENTANYL CITRATE 50 MCG: 50 INJECTION, SOLUTION INTRAMUSCULAR; INTRAVENOUS at 12:41

## 2025-04-09 RX ADMIN — GABAPENTIN 100 MG: 100 CAPSULE ORAL at 05:53

## 2025-04-09 RX ADMIN — SENNOSIDES AND DOCUSATE SODIUM 1 TABLET: 50; 8.6 TABLET ORAL at 21:30

## 2025-04-09 RX ADMIN — Medication 100 MG: at 05:53

## 2025-04-09 RX ADMIN — LEVETIRACETAM 500 MG: 500 TABLET, FILM COATED ORAL at 18:48

## 2025-04-09 RX ADMIN — POLYETHYLENE GLYCOL 3350 1 PACKET: 17 POWDER, FOR SOLUTION ORAL at 05:53

## 2025-04-09 RX ADMIN — FOLIC ACID 1 MG: 1 TABLET ORAL at 05:53

## 2025-04-09 RX ADMIN — SIMVASTATIN 10 MG: 20 TABLET, FILM COATED ORAL at 18:48

## 2025-04-09 RX ADMIN — OXYCODONE HYDROCHLORIDE 5 MG: 5 SOLUTION ORAL at 13:46

## 2025-04-09 RX ADMIN — Medication 1 EACH: at 05:53

## 2025-04-09 RX ADMIN — ACETAMINOPHEN 1000 MG: 500 TABLET, FILM COATED ORAL at 00:46

## 2025-04-09 RX ADMIN — LEVETIRACETAM 500 MG: 100 INJECTION, SOLUTION INTRAVENOUS at 05:53

## 2025-04-09 RX ADMIN — INSULIN LISPRO 1 UNITS: 100 INJECTION, SOLUTION INTRAVENOUS; SUBCUTANEOUS at 20:09

## 2025-04-09 RX ADMIN — METAXALONE 400 MG: 800 TABLET ORAL at 18:48

## 2025-04-09 RX ADMIN — GABAPENTIN 100 MG: 100 CAPSULE ORAL at 00:46

## 2025-04-09 RX ADMIN — LEVETIRACETAM 500 MG: 100 INJECTION, SOLUTION INTRAVENOUS at 00:46

## 2025-04-09 RX ADMIN — SODIUM CHLORIDE, POTASSIUM CHLORIDE, SODIUM LACTATE AND CALCIUM CHLORIDE: 600; 310; 30; 20 INJECTION, SOLUTION INTRAVENOUS at 12:19

## 2025-04-09 RX ADMIN — DOCUSATE SODIUM 100 MG: 100 CAPSULE, LIQUID FILLED ORAL at 05:53

## 2025-04-09 RX ADMIN — GABAPENTIN 100 MG: 100 CAPSULE ORAL at 21:30

## 2025-04-09 RX ADMIN — FENTANYL CITRATE 50 MCG: 50 INJECTION, SOLUTION INTRAMUSCULAR; INTRAVENOUS at 13:54

## 2025-04-09 SDOH — ECONOMIC STABILITY: TRANSPORTATION INSECURITY
IN THE PAST 12 MONTHS, HAS LACK OF RELIABLE TRANSPORTATION KEPT YOU FROM MEDICAL APPOINTMENTS, MEETINGS, WORK OR FROM GETTING THINGS NEEDED FOR DAILY LIVING?: NO

## 2025-04-09 SDOH — ECONOMIC STABILITY: TRANSPORTATION INSECURITY
IN THE PAST 12 MONTHS, HAS THE LACK OF TRANSPORTATION KEPT YOU FROM MEDICAL APPOINTMENTS OR FROM GETTING MEDICATIONS?: NO

## 2025-04-09 ASSESSMENT — ENCOUNTER SYMPTOMS
ABDOMINAL PAIN: 0
WEAKNESS: 1
DIARRHEA: 0
SENSORY CHANGE: 0
TREMORS: 0
MYALGIAS: 0
CHILLS: 0
BACK PAIN: 0
HEADACHES: 0
NAUSEA: 0
SPEECH CHANGE: 0
NERVOUS/ANXIOUS: 1
BACK PAIN: 1
DIZZINESS: 0
SORE THROAT: 0
SHORTNESS OF BREATH: 0
DIAPHORESIS: 0
MYALGIAS: 1
FEVER: 0
WEAKNESS: 0
NECK PAIN: 1
FOCAL WEAKNESS: 0
TINGLING: 0
NECK PAIN: 0
BLURRED VISION: 0
HEADACHES: 1
COUGH: 0
PALPITATIONS: 0
VOMITING: 0
HEARTBURN: 0
WHEEZING: 0
FLANK PAIN: 0
DOUBLE VISION: 0

## 2025-04-09 ASSESSMENT — COGNITIVE AND FUNCTIONAL STATUS - GENERAL
MOBILITY SCORE: 24
SUGGESTED CMS G CODE MODIFIER MOBILITY: CH
DAILY ACTIVITIY SCORE: 24
SUGGESTED CMS G CODE MODIFIER DAILY ACTIVITY: CH

## 2025-04-09 ASSESSMENT — PAIN DESCRIPTION - PAIN TYPE
TYPE: ACUTE PAIN

## 2025-04-09 ASSESSMENT — LIFESTYLE VARIABLES
AGITATION: NORMAL ACTIVITY
ON A TYPICAL DAY WHEN YOU DRINK ALCOHOL HOW MANY DRINKS DO YOU HAVE: 4
EVER HAD A DRINK FIRST THING IN THE MORNING TO STEADY YOUR NERVES TO GET RID OF A HANGOVER: NO
ANXIETY: NO ANXIETY (AT EASE)
HOW MANY TIMES IN THE PAST YEAR HAVE YOU HAD 5 OR MORE DRINKS IN A DAY: 0
ALCOHOL_USE: YES
TOTAL SCORE: 0
SUBSTANCE_ABUSE: 1
HAVE YOU EVER FELT YOU SHOULD CUT DOWN ON YOUR DRINKING: NO
HAVE PEOPLE ANNOYED YOU BY CRITICIZING YOUR DRINKING: NO
AUDITORY DISTURBANCES: NOT PRESENT
VISUAL DISTURBANCES: NOT PRESENT
DOES PATIENT WANT TO STOP DRINKING: NO
PAROXYSMAL SWEATS: NO SWEAT VISIBLE
CONSUMPTION TOTAL: POSITIVE
AVERAGE NUMBER OF DAYS PER WEEK YOU HAVE A DRINK CONTAINING ALCOHOL: 3
TOTAL SCORE: 0
TOTAL SCORE: 0
TREMOR: NO TREMOR
ORIENTATION AND CLOUDING OF SENSORIUM: ORIENTED AND CAN DO SERIAL ADDITIONS
TOTAL SCORE: 0
NAUSEA AND VOMITING: NO NAUSEA AND NO VOMITING
EVER FELT BAD OR GUILTY ABOUT YOUR DRINKING: NO
HEADACHE, FULLNESS IN HEAD: NOT PRESENT

## 2025-04-09 ASSESSMENT — SOCIAL DETERMINANTS OF HEALTH (SDOH)
WITHIN THE PAST 12 MONTHS, YOU WORRIED THAT YOUR FOOD WOULD RUN OUT BEFORE YOU GOT THE MONEY TO BUY MORE: NEVER TRUE
IN THE PAST 12 MONTHS, HAS THE ELECTRIC, GAS, OIL, OR WATER COMPANY THREATENED TO SHUT OFF SERVICE IN YOUR HOME?: NO
WITHIN THE LAST YEAR, HAVE YOU BEEN KICKED, HIT, SLAPPED, OR OTHERWISE PHYSICALLY HURT BY YOUR PARTNER OR EX-PARTNER?: NO
WITHIN THE PAST 12 MONTHS, THE FOOD YOU BOUGHT JUST DIDN'T LAST AND YOU DIDN'T HAVE MONEY TO GET MORE: NEVER TRUE
WITHIN THE LAST YEAR, HAVE YOU BEEN HUMILIATED OR EMOTIONALLY ABUSED IN OTHER WAYS BY YOUR PARTNER OR EX-PARTNER?: NO
WITHIN THE LAST YEAR, HAVE YOU BEEN AFRAID OF YOUR PARTNER OR EX-PARTNER?: NO
WITHIN THE LAST YEAR, HAVE TO BEEN RAPED OR FORCED TO HAVE ANY KIND OF SEXUAL ACTIVITY BY YOUR PARTNER OR EX-PARTNER?: NO

## 2025-04-09 ASSESSMENT — PATIENT HEALTH QUESTIONNAIRE - PHQ9
1. LITTLE INTEREST OR PLEASURE IN DOING THINGS: NOT AT ALL
2. FEELING DOWN, DEPRESSED, IRRITABLE, OR HOPELESS: NOT AT ALL
SUM OF ALL RESPONSES TO PHQ9 QUESTIONS 1 AND 2: 0

## 2025-04-09 ASSESSMENT — PAIN SCALES - GENERAL: PAIN_LEVEL: 6

## 2025-04-09 ASSESSMENT — COPD QUESTIONNAIRES
COPD SCREENING SCORE: 5
DURING THE PAST 4 WEEKS HOW MUCH DID YOU FEEL SHORT OF BREATH: SOME OF THE TIME
HAVE YOU SMOKED AT LEAST 100 CIGARETTES IN YOUR ENTIRE LIFE: YES
DO YOU EVER COUGH UP ANY MUCUS OR PHLEGM?: NO/ONLY WITH OCCASIONAL COLDS OR INFECTIONS

## 2025-04-09 ASSESSMENT — FIBROSIS 4 INDEX: FIB4 SCORE: 2.25

## 2025-04-09 NOTE — ASSESSMENT & PLAN NOTE
Complaints of neck pain  Unable to clear C-spine due to intoxication  CT C-spine inconclusive due to chronic degenerative changes  Continue C-collar until sober and rexamine  4/10 Continues with neck pain. MRI unremarkable  Soft collar for comfort

## 2025-04-09 NOTE — THERAPY
"Speech Language Pathology   Cognitive Evaluation     Patient Name: Lily Lloyd  AGE:  89 y.o., SEX:  female  Medical Record #: [id number]  Date of Service: 4/9/2025    History of Present Illness  89 y/o F admit 4/8 after a GLF, + ETOH, + LOC. This resulted in a right radius fracture and SDH.     PMH: GLF w/ SDH 3 years ago, ETOH, arthritis, breath shortness, cataract, diabets, heart burn, hiatus hernia syndrome, HTN, indigestion, pain, renal disorder, snoring, DLD, GERD, CKD III    Head CT 4/9: \"1.  2.6 mm right parietal subdural hematoma, stable since prior study.  2.  Calcified mass abutting the anterior right falx, appearance compatible with meningioma.  3.  Bilateral sinusitis changes, greatest in the right maxillary sinus.  4.  Nonspecific white matter changes, commonly associated with small vessel ischemic disease.  Associated mild cerebral atrophy is noted\"    Previous ST services:  Cognitive Evaluation 4/3/23: \"Pt presenting with functional cognitive-linguistic function. 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 Her sister from [place] has recently arrived to live with them/help. She will stay indefinately, and the pt reported she may move to [place] with them permanently. The sister Rosalba has recently undergone breast cancer surgery and cannot do heavy lifting. The pt is largely independent for iADLs and drives, though her sister takes care of most medication management and bills/finances.     Communication: Previous cog eval from 3 years ago was WNL  Swallowing: WNL     Oral Mechanism Evaluation  Dentition: " Good  Facial Symmetry: Equal  Facial Sensation: Equal  Labial Observations: WFL  Lingual Observations: Midline  Motor Speech: WNL  Comments: Pt in C collar    Laryngeal Function Exam  Secretion Management: Adequate  Voice Quality: WFL  Cough: Perceptually WNL    Subjective  Pt seen alert & grossly oriented, saturating on room air w/ C collar donned.    Communication Domain(s)  Expressive Language: WFL  Receptive Language: WFL  Cognitive-Linguistic:  (Mild-Moderate)  Reading: WFL  Social/Pragmatic: WFL    Assessment  The patient was seen this date for a Cognitive-Linguistic evaluation.    Cognistat  Orientation: Average  Attention: Average  Comprehension: Average  Repetition: Average  Naming: Average  Memory: Moderate   - Pt reported that over the past 3 years she has had a decline in her memory. She felt her performance on this task was baseline.   Calculations: Average  Similarities: Mild  Judgement: Average    Medication Management  Medication Management: +2/2 w/ mod cueing (Pt reported that her sister would typically assist for more complex medication management)    Clinical Impressions  Signs of mild-moderate cognitive-linguistic deficits primarily in the area of memory, which the pt reports has been a chronic gradual decline. Recommend intermittent assistance w/ iADLs in the areas of more complex medication management and bills/finances. Reportedly- the pt's sister Rosalba now lives with her, and is already providing assistance in these areas. No further ST services appear indicated at this level of care- the pt maintained that this is her baseline level of functioning. Consider OP ST tx addressing functional cognitive tx should a worsening from baseline be suspected post d/c when returning to daily routine.      NOTE: It is not within the scope of practice of Speech-Language Pathologists to determine patient capacity. Please defer to the physician or psych to complete this assessment.      Recommendations  Supervision Needs Upons Discharge: Intermittent assistance with IADLs (see below)  IADLs: Medication management, Financial management     SLP Treatment Plan  Treatment Plan: None Indicated  SLP Frequency: N/A - Evaluation Only  Estimated Duration: N/A - Evaluation Only    Anticipated Discharge Needs  Discharge Recommendations: Anticipate that the patient will have no further speech therapy needs after discharge from the hospital  Therapy Recommendations Upon DC: Not Indicated    Sumaya Elam, SLP

## 2025-04-09 NOTE — PROGRESS NOTES
4 Eyes Skin Assessment Completed by FLORINA Dominguez and FLORINA Waggoner.  Wt: 68.4 kg  Temp: 97.2 F    Head Bruising, Swelling, Redness, and Edema; right facial laceration bruising and swelling  Ears: dried blood  Nose: dried blood   Mouth WDL dry  Neck WDL C collar in place  Breast/Chest WDL  Shoulder Blades WDL  Spine WDL; moles and discoloration  (R) Arm/Elbow/Hand: cast in place  (L) Arm/Elbow/Hand Bruising left forearm  Abdomen: redness from EKG stickers, flacky discoloration on mid pannus  Groin WDL  Scrotum/Coccyx/Buttocks WDL  (R) Leg Redness, Scab, and Swelling old scar right lateral knee, bruising, redness swelling and scabs to lower leg  (L) Leg Redness, Scab, and Swelling bruising, redness swelling and scabs to lower leg  (R) Heel/Foot/Toe WDL  (L) Heel/Foot/Toe WDL    Personal Belongings: Watch, blue sneakers, white socks, blue jeans, black bra, blue sweatshirt, dark blue jacket, black and white igloo purse, glasses case with glasses inside, red card meehan, cigarettes, 5 check books, hair brush, brown wallet with credit cards, black wallet with credit cards and cash/coins ($510.21), flip phone cell phone, 2 lighters, lip stick, car key fob, silver color necklace, one brennan color earring.       Devices In Places ECG, Blood Pressure Cuff, Pulse Ox, and Cervical Collar      Interventions In Place Sacral Mepilex, Pillows, Q2 Turns, and Low Air Loss Mattress    Possible Skin Injury No    Pictures Uploaded Into Epic Yes  Wound Consult Placed Yes  RN Wound Prevention Protocol Ordered Yes

## 2025-04-09 NOTE — ANESTHESIA PREPROCEDURE EVALUATION
Case: [id number] Date/Time: 04/09/25 1103    Procedure: OPEN REDUCTION INTERNAL FIXATION OF RIGHT DISTAL RADIUS (Right: Wrist)    Anesthesia type: General    Pre-op diagnosis: right distal radius fracture    Location: [facility] OR 16 / SURGERY [facility]    Surgeons: Eladio Ervin M.D.            Relevant Problems   CARDIAC   (positive) Essential hypertension      GI   (positive) GERD (gastroesophageal reflux disease)         (positive) Stage 3a chronic kidney disease      Other   (positive) DJD (degenerative joint disease)       Physical Exam    Airway - unable to assess  Mallampati: II  TM distance: >3 FB  Neck ROM: full       Cardiovascular - normal exam  Rhythm: regular  Rate: normal  (-) murmur     Dental - normal exam           Pulmonary - normal exam  Breath sounds clear to auscultation     Abdominal    Neurological - normal exam                   Anesthesia Plan    ASA 3       Plan - general       Airway plan will be LMA          Induction: intravenous    Postoperative Plan: Postoperative administration of opioids is intended.    Pertinent diagnostic labs and testing reviewed    Informed Consent:    Anesthetic plan and risks discussed with patient.    Use of blood products discussed with: patient whom consented to blood products.

## 2025-04-09 NOTE — ASSESSMENT & PLAN NOTE
4/9 The patient is 75 years old or older and a geriatrics consult is indicated  Santi Rawls MD, Geriatric Hospitalist.

## 2025-04-09 NOTE — DISCHARGE PLANNING
NOK/Emergency Contact is Pt's Sister, Rosalba, [phone] No ACP docs    Met with Pt at bedside. Pt lives with her 78 yr old sister who was recently diagnosed with Breast Cancer, and her 97 year old Aunt who requires full care and is wheelchair bound. She is not, and never has been . No children due to a total hysterectomy at age 18 d/t fibrous tumors.   Pt is essentially financially responsible for all three of them on an income of $3200.00 monthly from a prison fund. Pt is independent with all ADL/IADLs, drives, and denies a hx of any alcohol or drug abuse. She said she went out to drink before this fall because she was feeling overwhelmed and depressed at her current state of affairs. She acknowledges that she cannot continue to care for herself as well as her two other relatives. Her Aunt, Aubrie Garay, was in a SNF until two years ago when it became unaffordable.     Pt needs resources for herself, her sister, and placement for her Aunt.      Care Transition Team Assessment    Information Source  Orientation Level: Oriented X4  Information Given By: Patient  Who is responsible for making decisions for patient? : Patient    Readmission Evaluation  Is this a readmission?: No    Elopement Risk  Legal Hold: No  Ambulatory or Self Mobile in Wheelchair: No-Not an Elopement Risk  Elopement Risk: Not at Risk for Elopement    Interdisciplinary Discharge Planning  Lives with - Patient's Self Care Capacity: Alone and Able to Care For Self  Patient or legal guardian wants to designate a caregiver: No  Support Systems: Family Member(s)  Housing / Facility: 2 Story House    Discharge Preparedness  What is your plan after discharge?: Uncertain - pending medical team collaboration  What are your discharge supports?: Sibling  Prior Functional Level: Ambulatory, Drives Self, Independent with Activities of Daily Living, Independent with Medication Management  Difficulity with ADLs: None  Difficulity with IADLs:  None    Functional Assesment  Prior Functional Level: Ambulatory, Drives Self, Independent with Activities of Daily Living, Independent with Medication Management    Finances  Financial Barriers to Discharge: No  Prescription Coverage: Yes    Vision / Hearing Impairment  Vision Impairment : Yes  Right Eye Vision: Impaired, Wears Glasses  Left Eye Vision: Impaired, Wears Glasses  Hearing Impairment : Yes  Hearing Impairment: Patient Declines to Wear Hearing Device(s)  Does Pt Need Special Equipment for the Hearing Impaired?: No         Advance Directive  Advance Directive?: None  Advance Directive offered?: AD Booklet refused    Domestic Abuse  Have you ever been the victim of abuse or violence?: No  Possible Abuse/Neglect Reported to:: Not Applicable    Psychological Assessment  History of Substance Abuse: None  History of Psychiatric Problems: Yes  Non-compliant with Treatment: No  Newly Diagnosed Illness: Yes    Discharge Risks or Barriers  Discharge risks or barriers?: Mental health, Other (comment)  Patient risk factors: Cognitive / sensory / physical deficit, Lack of outside supports, Mental health, Multiple organizational systems involved, Vulnerable adult, Substance abuse    Anticipated Discharge Information  Discharge Disposition: Disch to  rehab facility or distinct part unit (62)  Discharge Address: Home ([address] [place] [id number])  Discharge Contact Phone Number: Rosalba ()

## 2025-04-09 NOTE — ASSESSMENT & PLAN NOTE
Cr 1.21 from baseline 0.9.   GFR 43.  Avoid nephrotoxins.   Renally dose medications.  Trend renal indices.   Gentle hydration.

## 2025-04-09 NOTE — CARE PLAN
The patient is Watcher - Medium risk of patient condition declining or worsening    Shift Goals  Clinical Goals: Stable neuro exams Q2H, pain control, monitor CIWA  Patient Goals: Comfort, rest  Family Goals: ANÍBAL    Progress made toward(s) clinical / shift goals:    Problem: Pain - Standard  Goal: Alleviation of pain or a reduction in pain to the patient’s comfort goal  Outcome: Progressing     Problem: Knowledge Deficit - Standard  Goal: Patient and family/care givers will demonstrate understanding of plan of care, disease process/condition, diagnostic tests and medications  Outcome: Progressing     Problem: Optimal Care for Alcohol Withdrawal  Goal: Optimal Care for the alcohol withdrawal patient  Outcome: Progressing     Problem: Seizure Precautions  Goal: Implementation of seizure precautions  Outcome: Progressing     Problem: Lifestyle Changes  Goal: Patient's ability to identify lifestyle changes and available resources to help reduce recurrence of condition will improve  Outcome: Progressing     Problem: Psychosocial  Goal: Patient's level of anxiety will decrease  Outcome: Progressing  Goal: Spiritual and cultural needs incorporated into hospitalization  Outcome: Progressing     Problem: Risk for Aspiration  Goal: Patient's risk for aspiration will be absent or decrease  Outcome: Progressing     Problem: Skin Integrity  Goal: Skin integrity is maintained or improved  Outcome: Progressing     Problem: Fall Risk  Goal: Patient will remain free from falls  Outcome: Progressing       Patient is not progressing towards the following goals:

## 2025-04-09 NOTE — CARE PLAN
The patient is Watcher - Medium risk of patient condition declining or worsening    Shift Goals  Clinical Goals: Cont Neuro Monitoring, CIWA, and Pain Control  Patient Goals: Comfort and Updates on d/c  Family Goals: ANÍBAL    Progress made toward(s) clinical / shift goals:    Problem: Pain - Standard  Goal: Alleviation of pain or a reduction in pain to the patient’s comfort goal  Outcome: Progressing     Problem: Knowledge Deficit - Standard  Goal: Patient and family/care givers will demonstrate understanding of plan of care, disease process/condition, diagnostic tests and medications  Outcome: Progressing       Patient is not progressing towards the following goals:

## 2025-04-09 NOTE — ED NOTES
Sissy RN @ bedside to take pt to floor. NAD. VSS. Respirations equal and unlabored. All belongings with patient.

## 2025-04-09 NOTE — ED NOTES
Med Rec completed per patient     Allergies reviewed: yes     Oral antibiotics in the past 30 days: yes   Doxycycline 100 mg BID. 10 day course completed. Last Dose: 03/29/2025    Anticoagulant in past 14 days: yes  Unknown Name. Patient was able to verify med list and doses but doesn't remember the name of her blood thinner. Verified with patient twice she takes 10 mg once daily and she took her last dose this morning 04/08/2025  I couldn't find a record of any blood thinners dispensed in the past 90 days. Pharmacy not open at this time.     Dispense history available in EPIC: yes     Pharmacy patient utilizes: [facility]   [phone]

## 2025-04-09 NOTE — ED NOTES
This RN contacted Sybil HEATON from Ortho for Jeane schmitz. Charlie HEATON picking up supplies from Ortho.

## 2025-04-09 NOTE — ED NOTES
Sugar tong splint applied to right wrist. [place] J applied to neck. This RN, ANTWAN Sorenson, and Sherita el at bedside to apply items onto patient.

## 2025-04-09 NOTE — ANESTHESIA TIME REPORT
Anesthesia Start and Stop Event Times       Date Time Event    4/9/2025 1205 Ready for Procedure     1219 Anesthesia Start     1316 Anesthesia Stop          Responsible Staff  04/09/25      Name Role Begin End    Dallas Jacobson III, M.D. Anesth 1219 1316          Overtime Reason:  no overtime (within assigned shift)    Comments:

## 2025-04-09 NOTE — CONSULTS
Date of Service: 04/09/25    Lily Lloyd was seen today in consultation for *** at the request of ***    CC: ***    HPI: Lily Lloyd is a 89 y.o. female who presents with complaints of pain to ***.  This started *** after ***.  The pain is ***/10 and is described as sharp.  The pain is made worse by palpation of the area and made better by rest and immobilization.    PMH:   Past Medical History:   Diagnosis Date    Arthritis     every where    Breath shortness     EXERCISE    CATARACT     Diabetes     Told pre-diabetic 2 yrs ago, diet controlled    Heart burn     takes protonix occasionally    Hiatus hernia syndrome     Hypertension     controlled on meds    Indigestion     Acid Reflux    Pain     Renal disorder     currently being worked up.     Snoring     Does not want to have evaluated at this time       PSH:   Past Surgical History:   Procedure Laterality Date    MANUEL HOLES Left 4/3/2023    Procedure: CREATION, CRANIAL MANUEL HOLE;  Surgeon: Shmuel Rodriguez M.D.;  Location: SURGERY [facility];  Service: Neurosurgery    CATARACT PHACO WITH IOL  8/20/2013    Performed by Alejandro Peres M.D. at SURGERY SURGICAL [facility] ORS    CATARACT PHACO WITH IOL  8/6/2013    Performed by Alejandro Peres M.D. at SURGERY [facility] ORS    SINUSOTOMIES  12/20/2012    Performed by Hattie Mcmahon M.D. at SURGERY SAME DAY [facility] ORS    NASAL POLYPECTOMY  12/20/2012    Performed by Hattie Mcmahon M.D. at SURGERY [facility]    NASAL POLYPECTOMY  8/19/2010    Performed by HATTIE MCMAHON at [facility]ERY [facility]    ETHMOIDECTOMY  8/19/2010    Performed by HATTIE MCMAHON at SURGERY SAME DAY [facility] ORS    ANTROSTOMY  8/19/2010    Performed by HATTIE MCMAHON at [facility]ERY [facility]    ABDOMINAL HYSTERECTOMY TOTAL      APPENDECTOMY      KNEE ARTHROPLASTY TOTAL      right    OOPHORECTOMY         FH:   Family History   Problem Relation Age of Onset    Cancer Sister         breast cancer     Cancer Paternal Aunt         breast cancer    Diabetes Father     Cancer Sister        SH:   Social History     Socioeconomic History    Marital status: Single     Spouse name: Not on file    Number of children: Not on file    Years of education: Not on file    Highest education level: Not on file   Occupational History    Not on file   Tobacco Use    Smoking status: Every Day     Current packs/day: 0.50     Average packs/day: 0.5 packs/day for 50.0 years (25.0 ttl pk-yrs)     Types: Cigarettes    Smokeless tobacco: Current    Tobacco comments:     0.5 PPD X45 YRS   Vaping Use    Vaping status: Never Used   Substance and Sexual Activity    Alcohol use: Yes     Alcohol/week: 1.0 oz     Types: 2 Glasses of wine per week     Comment: socially    Drug use: No    Sexual activity: Not Currently   Other Topics Concern    Not on file   Social History Narrative    Not on file     Social Drivers of Health     Financial Resource Strain: Not on file   Food Insecurity: No Food Insecurity (4/9/2025)    Hunger Vital Sign     Worried About Running Out of Food in the Last Year: Never true     Ran Out of Food in the Last Year: Never true   Transportation Needs: No Transportation Needs (4/9/2025)    PRAPARE - Transportation     Lack of Transportation (Medical): No     Lack of Transportation (Non-Medical): No   Physical Activity: Not on file   Stress: Not on file   Social Connections: Not on file   Intimate Partner Violence: Not At Risk (4/9/2025)    Humiliation, Afraid, Rape, and Kick questionnaire     Fear of Current or Ex-Partner: No     Emotionally Abused: No     Physically Abused: No     Sexually Abused: No   Housing Stability: Low Risk  (4/9/2025)    Housing Stability Vital Sign     Unable to Pay for Housing in the Last Year: No     Number of Times Moved in the Last Year: 0     Homeless in the Last Year: No       ROS: In review of the following systems: Constitutional, Eyes, ENT, Cardiovascular,Respiratory, GI, ,  "Musculoskeletal, Skin, Neuro, Psych, Hematologic, Endocrine, Allergic; no pertinent findings were found related to the chief complaint and orthopedic injury ***    BP (!) 176/77   Pulse 65   Temp 35.8 °C (96.5 °F) (Axillary)   Resp 18   Ht 1.575 m (5' 2\")   Wt 68.4 kg (150 lb 12.7 oz)   SpO2 96%     Physical Exam:  General: Well nourished, well developed, age appropriate appearance   HEENT: Normocephalic, atraumatic  Psych: Normal mood and affect  Neck: Supple, no pain to motion  Chest/Pulmonary: breathing unlabored, no audible wheezing  Cardio: Regular heart rate and rhythm  Neuro: Sensation grossly intact to BUE and BLE, moving all four extremities  Skin: Intact with no full thickness abrasions or lacerations  MSK: ***    Imaging and labs: ***    Recent Labs     04/08/25 2040 04/09/25  0315   WBC 6.5 9.3   RBC 4.01* 3.99*   HEMOGLOBIN 12.0 11.5*   HEMATOCRIT 35.3* 35.3*   MCV 88.0 88.5   MCH 29.9 28.8   RDW 47.1 46.2   PLATELETCT 286 276   MPV 9.4 9.9   NEUTSPOLYS 46.90 82.20*   LYMPHOCYTES 37.70 11.50*   MONOCYTES 8.60 5.20   EOSINOPHILS 5.20 0.30   BASOPHILS 1.10 0.30       Assessment:   1. Closed Colles' fracture of right radius, initial encounter        2. SDH (subdural hematoma) (HCC)        3. Alcoholic intoxication with complication (HCC)        4. Sinus bradycardia            I discussed the diagnosis and findings with the patient at length.  I reviewed possible non operative and operative interventions and the risks and benefits of each of these.  she had a chance to ask questions and all of these were answered to her satisfaction.        Plan:  ***    " ask questions and all of these were answered to her satisfaction.        Plan:  N.p.o.  ORIF right distal radius fracture  Okay to use the hand for ADLs postoperatively  No heavy lifting pushing or pulling

## 2025-04-09 NOTE — ASSESSMENT & PLAN NOTE
Serum Na 132, baseline 127-134 in setting of alcoholism.  Gentle IVF.   Trend.  4/10 Sodium 132. Urine sodium pending per Geriatrics

## 2025-04-09 NOTE — PROGRESS NOTES
1313: Pt arrived from OR post ORIF wrist uder anesthesia. Pt is rousable to voice; confused initially; [place] and [place] to [place] home; frequent reorientation provided. Site dressing is CDI; brace in place. C-collar in place. Cardiac rhythm appears to be SB.     1345: Pt is oriented now; tolerating sips of water.     1419: Report to FLORINA Ewing.     1424: Pt to 933 via bed with Rn. On a monitor for transport.

## 2025-04-09 NOTE — H&P
Notified of consult: 22:02  Patient seen at:  22:11    CHIEF COMPLAINT: ground level fall.     HISTORY OF PRESENT ILLNESS: The patient is an 89 year-old White elderly woman with a past medical history of CKD stage IIIa, arthritis, hiatal hernia, and tobacco use disorder who was injured in a fall.  [place] and was [place] escorted in the parking lot when she reportedly suffered a fall, striking the right side of her face and right arm.  The patient states that she does not recall falling. She is unsure if she had a loss of consciousness.  She thinks that she had approximately 3 drinks earlier in the evening. The patient denies any chronic anticoagulation or antiplatelet medications. She complains of pain in her right wrist.    On arrival to the ED, the patient was noted to be bradycardic to the high 40s/low 50s with some transient hypotension to the 80s over 50s.  Heart rate and blood pressure corrected shortly thereafter.  Primary survey was unremarkable.  Secondary survey revealed =some abrasions over the right orbit and swelling and pain in the right wrist.  She moves all extremities followed commands.  CT scan of the head was obtained that showed a small right sided subdural hematoma measuring 2.9 mm.  CT of the C-spine showed severe degenerative disease, but no obvious acute injury.  Labs are notable for mild hyponatremia (132, baseline), and blood alcohol level of 208.  Plain films of the right wrist showed distal radial metaphyseal fracture which was reduced in the emergency room and splinted.      TRIAGE CATEGORY: The patient was triaged as a TBI Alert. An expeditious primary and secondary survey with required adjuncts was conducted. See Trauma Narrator for full details.    PAST MEDICAL HISTORY:  has a past medical history of Arthritis, Breath shortness, CATARACT, Diabetes, Heart burn, Hiatus hernia syndrome, Hypertension, Indigestion, Pain, Renal disorder, and  Snoring.    She has no past medical history of Breast cancer (HCC).    PAST SURGICAL HISTORY:  has a past surgical history that includes nasal polypectomy (8/19/2010); ethmoidectomy (8/19/2010); antrostomy (8/19/2010); knee arthroplasty total; appendectomy; sinusotomies (12/20/2012); nasal polypectomy (12/20/2012); abdominal hysterectomy total; oophorectomy; cataract phaco with iol (8/6/2013); cataract phaco with iol (8/20/2013); and stevie holes (Left, 4/3/2023).    ALLERGIES:   Allergies   Allergen Reactions    Pcn [Penicillins] Rash and Swelling    Sulfa Drugs Hives and Itching    Tape        CURRENT MEDICATIONS:   Home Medications       Reviewed by Masood Henderson R.N. (Registered Nurse) on 04/08/25 at 2043  Med List Status: Partial     Medication Last Dose Status   diclofenac sodium (VOLTAREN) 1 % Gel  Active   fluticasone (FLONASE) 50 MCG/ACT nasal spray  Active   folic acid (FOLVITE) 1 MG Tab  Active   furosemide (LASIX) 40 MG Tab  Active   lisinopril (PRINIVIL) 10 MG Tab  Active   loratadine (CLARITIN) 10 MG Tab  Active   multivitamin Tab  Active   potassium chloride SA (KDUR) 20 MEQ Tab CR  Active   simvastatin (ZOCOR) 10 MG Tab  Active   thiamine (THIAMINE) 100 MG tablet  Active                  Audit from Redirected Encounters    **Home medications have not yet been reviewed for this encounter**       FAMILY HISTORY: family history includes Cancer in her paternal aunt, sister, and sister; Diabetes in her father.    SOCIAL HISTORY:  reports that she has been smoking cigarettes. She has a 25 pack-year smoking history. She uses smokeless tobacco. She reports current alcohol use of about 1.0 oz of alcohol per week. She reports that she does not use drugs.    REVIEW OF SYSTEMS: Comprehensive review of systems is negative with the exception of the aforementioned HPI, PMH, and PSH bullets in accordance with CMS guidelines.    PHYSICAL EXAMINATION:      Vital Signs: /63   Pulse 76   Temp 36.4 °C (97.6  "°F)   Resp 20   Ht 1.575 m (5' 2\")   Wt 59 kg (130 lb)   SpO2 96%   Constitutional: Awake, alert, oriented x3. No acute distress. GCS 15. E4 V5 M6.  Head: No cephalohematoma. Pupils reactive bilaterally. Midface stable. No malocclusion.  Abrasions over the upper and the lower lateral aspects of the right orbit.  There is some periorbital swelling.  EOMI  Neck: No tracheal deviation. No midline cervical spine tenderness.   Cardiovascular: Normal rate, regular rhythm.  Pulmonary/Chest: Clavicles nontender to palpation. There is not any chest wall tenderness bilaterally.  No crepitus. Positive breath sounds bilaterally.   Abdominal: Soft, nondistended. Nontender to palpation. Pelvis is stable to anterior-posterior compression.   Musculoskeletal: Right upper extremity in soft splint.  Moves fingers. palpable radial pulse.   strength and range of motion limited by pain.  Left upper extremity grossly atraumatic, palpable radial pulse. 5/5  strength. Full ROM and strength at elbow.  Right lower extremity grossly atraumatic. 5/5 strength in ankle plantar flexion and dorsiflexion. No pain and full ROM at right knee and hip. 2+ DP pulse.  Left  lower extremity grossly atraumatic. 5/5 strength in ankle plantar flexion and dorsiflexion. No pain and full ROM at left knee and hip. 2+ DP pulse.  Back: Midline thoracic and lumbar spines are nontender to palpation. No step-offs.   : Deferred  Neurological: Sensation grossly intact to light touch dorsum and plantar surfaces of both feet and the medial and lateral aspects of both lower legs.  Sensation grossly intact to light touch dorsum and plantar surfaces of both hands.   Skin: Skin is warm and dry.  No diaphoresis. No erythema. No pallor.   Psychiatric:  Normal mood and affect.        LABORATORY VALUES:   Recent Labs     04/08/25 2040   WBC 6.5   RBC 4.01*   HEMOGLOBIN 12.0   HEMATOCRIT 35.3*   MCV 88.0   MCH 29.9   MCHC 34.0   RDW 47.1   PLATELETCT 286   MPV 9.4 "     Recent Labs     04/08/25 2040   SODIUM 132*   POTASSIUM 3.6   CHLORIDE 99   CO2 20   GLUCOSE 103*   BUN 23*   CREATININE 1.21   CALCIUM 8.0*     Recent Labs     04/08/25 2040   ASTSGOT 28   ALTSGPT 15   TBILIRUBIN 0.4   ALKPHOSPHAT 79   GLOBULIN 2.0            IMAGING:   DX-WRIST-COMPLETE 3+ RIGHT   Final Result         1.  Distal radial metaphyseal fracture      CT-CSPINE WITHOUT PLUS RECONS   Final Result         1.  Multilevel degenerative changes of the cervical spine limit diagnostic sensitivity of this examination, otherwise no acute traumatic bony injury of the cervical spine is apparent.   2.  Atherosclerosis      CT-HEAD W/O   Final Result         1.  2.9 mm right parietal subdural hematoma.   2.  Calcified mass abutting the anterior right falx, appearance compatible with meningioma.   3.  Bilateral maxillary and ethmoid sinusitis changes   4.  Nonspecific white matter changes, commonly associated with small vessel ischemic disease.  Associated mild cerebral atrophy is noted.      Based solely on CT findings, the brain injury guideline category is mBIG 1.      SDH < 4mm   IPH < 4mm   SAH < 3 sulci and < 1mm      The original BIG retrospective analysis found radiographic progression in 0% of BIG 1 patients and 2.6% BIG 2.      These findings were discussed with the patient's clinician, David Sorenson, on 4/8/2025 9:33 PM.          ASSESSMENT AND PLAN:   89-year-old female with EtOH and tobacco use disorder presents after suffering a ground-level fall while intoxicated.  CT of the head shows a small right parietal subdural hematoma.  While this is a a radiographic BIG 1 bleed, the patient is intoxicated, making her a BIG 2.  As such, she will be admitted to the ICU for close neuromonitoring.  No indication for neurosurgical consultation at this point.  Given that the patient is intoxicated and has significant degenerative C-spine disease, a c-collar was placed and will attempt clinical clearance in the  morning.  Per the ED physician, management of the right radial metaphyseal fracture was discussed with orthopedic surgery who recommended reduction and splinting (non-operative management).      Trauma  Ground level fall.  T-5000 Activation.  Surgeon List: Noam Evangelista MD. Trauma Surgery.    Subdural hematoma, post-traumatic (HCC)  2.9mm right parietal subdural hematoma.  mBIG 2.  Interval head CT ordered.  Non-operative management.  Post traumatic pharmacologic seizure prophylaxis for 1 week.  Speech Language Pathology cognitive evaluation.  Neurosurgery consultation not indicated.    Contraindication to deep vein thrombosis (DVT) prophylaxis  VTE prophylaxis initially contraindicated secondary to elevated bleeding risk.  4/8 Trauma surveillance venous duplex ultrasonography ordered.    Alcohol abuse  Admission blood alcohol level of 208mg/dL.  Trauma alcohol withdrawal protocol initiated.  PO vitamins.  SBIRT.    Right wrist fracture, closed, initial encounter  Distal right metaphyseal fracture.  Non-operative management.  Closed reduction and splinted in ED.  Weight bearing status - Nonweightbearing RUE.  Eladio Ervin MD. Orthopedic Surgeon. [facility].      DISPOSITION: Trauma ICU.  Interval Trauma tertiary survey.       ____________________________________     Noam Evangelista M.D.    DD: 4/8/2025  10:34 PM  Injury Scoring Scale

## 2025-04-09 NOTE — CONSULTS
Geriatric Medicine Consultation  Date of Service 4/9/2025    Referring Physician  Noam Evangelista M.D.    Consulting Physician  Santi Rawls M.D.    Reason for Consultation  Frequent falls, trauma, polypharmacy, alcohol abuse    History of Presenting Illness  89 y.o. female who presented 4/8/2025 with fall resulting in subdural hematoma, right wrist fracture.  Trauma surgery admitted the patient to ICU.  Neurosurgery and orthopedic surgery were consulted on the case.  Patient underwent ORIF of the right distal radius 4/9/2025.  Patient admits to frequent falls, supposed to use a walker as an assistive device but does not do so, admits to alcohol use of 2-4 drinks daily, for the last 2 years.  She denies any withdrawal symptoms in the past.  She denies any cognitive or hearing impairment.  States she has vision impairment but only needs to wear reading glasses.  Patient states she is independent in ADLs and IADLs, continues to drive.    Review of Systems  Review of Systems   Constitutional:  Positive for malaise/fatigue. Negative for chills, diaphoresis and fever.   HENT:  Negative for congestion, hearing loss and sore throat.    Eyes:  Negative for blurred vision.   Respiratory:  Negative for cough, shortness of breath and wheezing.    Cardiovascular:  Positive for leg swelling. Negative for chest pain and palpitations.   Gastrointestinal:  Negative for abdominal pain, diarrhea, heartburn, nausea and vomiting.   Genitourinary:  Negative for dysuria, flank pain and hematuria.   Musculoskeletal:  Positive for joint pain. Negative for back pain, myalgias and neck pain.   Skin:  Negative for rash.   Neurological:  Positive for weakness and headaches. Negative for dizziness, tremors, sensory change, speech change and focal weakness.   Psychiatric/Behavioral:  The patient is nervous/anxious.        Past Medical History   has a past medical history of Arthritis, Breath shortness, CATARACT, Diabetes, Heart burn, Hiatus  hernia syndrome, Hypertension, Indigestion, Pain, Renal disorder, and Snoring.    She has no past medical history of Breast cancer (HCC).    Surgical History   has a past surgical history that includes nasal polypectomy (8/19/2010); ethmoidectomy (8/19/2010); antrostomy (8/19/2010); knee arthroplasty total; appendectomy; sinusotomies (12/20/2012); nasal polypectomy (12/20/2012); abdominal hysterectomy total; oophorectomy; cataract phaco with iol (8/6/2013); cataract phaco with iol (8/20/2013); and stevie holes (Left, 4/3/2023).    Family History  family history includes Cancer in her paternal aunt, sister, and sister; Diabetes in her father.    Social History   reports that she has been smoking cigarettes. She has a 25 pack-year smoking history. She uses smokeless tobacco. She reports current alcohol use of about 1.0 oz of alcohol per week. She reports that she does not use drugs.    Living situation - sister and aunt live with her  Marital status - single  Transportation - drives  POLST                   No   Health care POA   No    Financial POA       No      Medications  Prior to Admission Medications   Prescriptions Last Dose Informant Patient Reported? Taking?   NON SPECIFIED 4/8/2025 Morning Patient Yes Yes   Sig: Take 10 mg by mouth every day. Blood thinner   doxycycline (VIBRAMYCIN) 100 MG Tab 3/29/2025 Evening Patient Yes Yes   Sig: Take 100 mg by mouth 2 times a day. 10 day course completed   furosemide (LASIX) 40 MG Tab 4/8/2025 Morning Patient Yes Yes   Sig: Take 40 mg by mouth every day.   lisinopril (PRINIVIL) 10 MG Tab 4/8/2025 Morning Patient No Yes   Sig: Take 1 Tablet by mouth every day.   loratadine (CLARITIN) 10 MG Tab 4/8/2025 Morning Patient Yes Yes   Sig: Take 10 mg by mouth every day.   multivitamin Tab 4/8/2025 Morning Patient No Yes   Sig: Take 1 Tablet by mouth every day.   potassium chloride SA (KDUR) 20 MEQ Tab CR 4/8/2025 Morning Patient No Yes   Sig: Take 1 Tablet by mouth every day.    simvastatin (ZOCOR) 10 MG Tab 4/8/2025 Morning Patient Yes Yes   Sig: Take 10 mg by mouth every evening.   thiamine (THIAMINE) 100 MG tablet 4/8/2025 Morning Patient No Yes   Sig: Take 1 Tablet by mouth every day.      Facility-Administered Medications: None       Allergies  Allergies   Allergen Reactions    Pcn [Penicillins] Rash and Swelling    Sulfa Drugs Hives and Itching    Tape        Geriatric Screening    ADL assistance              No   IADL assistance             No   Cognitive Impairment    No   Mood disorder                No   Polypharmacy                Yes  Vision impairment         Yes  Hearing impairment      No   Fall                                  Yes  Assistive device            Yes  Urinary incontinence    No   Nutrition issues            No   Food Insecurity            No   Pressure ulcer              No     Physical Exam  Temp:  [35.8 °C (96.5 °F)-36.7 °C (98 °F)] 36 °C (96.8 °F)  Pulse:  [46-78] 64  Resp:  [12-32] 14  BP: ()/(52-77) 167/73  SpO2:  [86 %-100 %] 99 %  Physical Exam  Vitals and nursing note reviewed.   HENT:      Head: Normocephalic.      Comments: Right facial abrasion and ecchymosis     Nose: No congestion.      Mouth/Throat:      Mouth: Mucous membranes are moist.   Eyes:      Conjunctiva/sclera: Conjunctivae normal.   Neck:      Comments: C-collar  Cardiovascular:      Rate and Rhythm: Normal rate and regular rhythm.      Heart sounds: Murmur heard.   Pulmonary:      Effort: Pulmonary effort is normal.      Breath sounds: Normal breath sounds.   Abdominal:      General: There is no distension.      Tenderness: There is no abdominal tenderness. There is no guarding or rebound.   Musculoskeletal:      Right lower leg: Edema present.      Left lower leg: Edema present.      Comments: Right forearm splint   Skin:     Findings: Bruising present.      Comments: Stasis changes both legs   Neurological:      General: No focal deficit present.      Mental Status: She is  alert and oriented to person, place, and time.      Cranial Nerves: No cranial nerve deficit.      Motor: No tremor.   Psychiatric:         Mood and Affect: Mood is anxious.         Speech: Speech is delayed.         Cognition and Memory: She exhibits impaired recent memory.           CAM  Acute onset and fluctuating course   No   Inattention                                         No   Disorganized thinking                        No   Altered level of consciousness          No         Laboratory  Recent Labs     04/08/25 2040 04/09/25  0315   WBC 6.5 9.3   RBC 4.01* 3.99*   HEMOGLOBIN 12.0 11.5*   HEMATOCRIT 35.3* 35.3*   MCV 88.0 88.5   MCH 29.9 28.8   MCHC 34.0 32.6   RDW 47.1 46.2   PLATELETCT 286 276   MPV 9.4 9.9     Recent Labs     04/08/25 2040 04/09/25  0315   SODIUM 132* 133*   POTASSIUM 3.6 4.0   CHLORIDE 99 103   CO2 20 19*   GLUCOSE 103* 99   BUN 23* 19   CREATININE 1.21 1.00   CALCIUM 8.0* 7.8*     Recent Labs     04/09/25  0315   INR 1.07               Imaging  DX-WRIST-COMPLETE 3+ RIGHT   Final Result      Digitized intraoperative radiograph is submitted for review. This examination is not for diagnostic purpose but for guidance during a surgical procedure. Please see the patient's chart for full procedural details.         INTERPRETING LOCATION: [address] [place] [address], [id number]      DX-PORTABLE FLUORO > 1 HOUR   Final Result      Portable fluoroscopy utilized for 9 seconds.         INTERPRETING LOCATION: [address] [facility], [address]      US-TRAUMA VEIN SCREEN LOWER BILAT EXTREMITY   Final Result      CT-MAXILLOFACIAL W/O PLUS RECONS   Final Result         1.  No acute traumatic facial bony injuries identified.   2.  Bilateral sinusitis changes   3.  Atherosclerosis      CT-HEAD W/O   Final Result         1.  2.6 mm right parietal subdural hematoma, stable since prior study.   2.  Calcified mass abutting the anterior right falx, appearance compatible with meningioma.   3.  Bilateral sinusitis changes,  greatest in the right maxillary sinus.   4.  Nonspecific white matter changes, commonly associated with small vessel ischemic disease.  Associated mild cerebral atrophy is noted.      DX-WRIST-COMPLETE 3+ RIGHT   Final Result         1.  Distal radial metaphyseal fracture, partially reduced      DX-WRIST-COMPLETE 3+ RIGHT   Final Result         1.  Distal radial metaphyseal fracture      CT-CSPINE WITHOUT PLUS RECONS   Final Result         1.  Multilevel degenerative changes of the cervical spine limit diagnostic sensitivity of this examination, otherwise no acute traumatic bony injury of the cervical spine is apparent.   2.  Atherosclerosis      CT-HEAD W/O   Final Result         1.  2.9 mm right parietal subdural hematoma.   2.  Calcified mass abutting the anterior right falx, appearance compatible with meningioma.   3.  Bilateral maxillary and ethmoid sinusitis changes   4.  Nonspecific white matter changes, commonly associated with small vessel ischemic disease.  Associated mild cerebral atrophy is noted.      Based solely on CT findings, the brain injury guideline category is mBIG 1.      SDH < 4mm   IPH < 4mm   SAH < 3 sulci and < 1mm      The original BIG retrospective analysis found radiographic progression in 0% of BIG 1 patients and 2.6% BIG 2.      These findings were discussed with the patient's clinician, David Sorenson, on 4/8/2025 9:33 PM.      DX-TIBIA AND FIBULA LEFT    (Results Pending)   DX-TIBIA AND FIBULA RIGHT    (Results Pending)   DX-LUMBAR SPINE-2 OR 3 VIEWS    (Results Pending)   DX-THORACIC SPINE-2 VIEWS    (Results Pending)       Assessment/Plan  * Trauma- (present on admission)  Assessment & Plan  Trauma surgery-primary service    Frequent falls- (present on admission)  Assessment & Plan  Check vitamin B12, vitamin D    Right wrist fracture, closed, initial encounter- (present on admission)  Assessment & Plan  Surgery done today  Pain control    Alcohol abuse- (present on  admission)  Assessment & Plan  Recommend initiating CIWA protocol, thiamine, folate, multivitamin  Check vitamin B12    Subdural hematoma, post-traumatic (HCC)- (present on admission)  Assessment & Plan  Neurochecks  Keppra    Stasis dermatitis of both legs- (present on admission)  Assessment & Plan  Wound care    Bilateral lower extremity edema- (present on admission)  Assessment & Plan  May hold Lasix for now    Chronic hyponatremia- (present on admission)  Assessment & Plan  Recommend free water restriction  Check urine sodium    Stage 3a chronic kidney disease- (present on admission)  Assessment & Plan  Follow BMP, check PTH    Essential hypertension- (present on admission)  Assessment & Plan  Recommend resuming lisinopril    Dyslipidemia- (present on admission)  Assessment & Plan  Was on simvastatin    Tobacco abuse- (present on admission)  Assessment & Plan  Nicotine placement protocol

## 2025-04-09 NOTE — DISCHARGE PLANNING
Medical Social Work    Referral: TBI/Acute Medical Patient     Intervention: Pt is an 89 year old female brought in by [facility] from [facility] [place]  Pt is Lily Lloyd (: 1935).  Per chart pt had a fall outside of [facility] in 2024 after drinking and her sister picked her up.  MSW contacted pt's sister, Rosalba Lloyd ([phone]) who was notified of pt's hospitalization.  Rosalba states that she will be coming into the hospital to sit with pt and receive updates in person.  Emotional support provided to pt's family.    Plan: SW will follow.

## 2025-04-09 NOTE — PROGRESS NOTES
Trauma / Surgical Daily Progress Note    Date of Service  4/9/2025    Chief Complaint  89 y.o. female admitted 4/8/2025 with TBI    Interval Events  GCS 15.   RA.    Fracture repair.  Today.   No Lovenox.    IVF at 100  Geriatric consult        Review of Systems  Review of Systems     Vital Signs for last 24 hours  Temp:  [35.8 °C (96.5 °F)-36.7 °C (98 °F)] 36 °C (96.8 °F)  Pulse:  [46-78] 64  Resp:  [12-32] 14  BP: ()/(52-77) 167/67  SpO2:  [86 %-100 %] 86 %    Hemodynamic parameters for last 24 hours       Respiratory Data     Respiration: 14, Pulse Oximetry: (!) 86 %        RUL Breath Sounds: Clear, RML Breath Sounds: Clear, RLL Breath Sounds: Diminished, ANTHONY Breath Sounds: Clear, LLL Breath Sounds: Diminished    Physical Exam  Physical Exam  Cardiovascular:      Rate and Rhythm: Regular rhythm.   Pulmonary:      Effort: No respiratory distress.   Abdominal:      Palpations: Abdomen is soft.   Musculoskeletal:      Comments: Right wrist splinted   Neurological:      GCS: GCS eye subscore is 4. GCS verbal subscore is 5. GCS motor subscore is 6.         Laboratory  Recent Results (from the past 24 hours)   CBC WITH DIFFERENTIAL    Collection Time: 04/08/25  8:40 PM   Result Value Ref Range    WBC 6.5 4.8 - 10.8 K/uL    RBC 4.01 (L) 4.20 - 5.40 M/uL    Hemoglobin 12.0 12.0 - 16.0 g/dL    Hematocrit 35.3 (L) 37.0 - 47.0 %    MCV 88.0 81.4 - 97.8 fL    MCH 29.9 27.0 - 33.0 pg    MCHC 34.0 32.2 - 35.5 g/dL    RDW 47.1 35.9 - 50.0 fL    Platelet Count 286 164 - 446 K/uL    MPV 9.4 9.0 - 12.9 fL    Neutrophils-Polys 46.90 44.00 - 72.00 %    Lymphocytes 37.70 22.00 - 41.00 %    Monocytes 8.60 0.00 - 13.40 %    Eosinophils 5.20 0.00 - 6.90 %    Basophils 1.10 0.00 - 1.80 %    Immature Granulocytes 0.50 0.00 - 0.90 %    Nucleated RBC 0.00 0.00 - 0.20 /100 WBC    Neutrophils (Absolute) 3.04 1.82 - 7.42 K/uL    Lymphs (Absolute) 2.44 1.00 - 4.80 K/uL    Monos (Absolute) 0.56 0.00 - 0.85 K/uL    Eos (Absolute) 0.34  0.00 - 0.51 K/uL    Baso (Absolute) 0.07 0.00 - 0.12 K/uL    Immature Granulocytes (abs) 0.03 0.00 - 0.11 K/uL    NRBC (Absolute) 0.00 K/uL   COMP METABOLIC PANEL    Collection Time: 04/08/25  8:40 PM   Result Value Ref Range    Sodium 132 (L) 135 - 145 mmol/L    Potassium 3.6 3.6 - 5.5 mmol/L    Chloride 99 96 - 112 mmol/L    Co2 20 20 - 33 mmol/L    Anion Gap 13.0 7.0 - 16.0    Glucose 103 (H) 65 - 99 mg/dL    Bun 23 (H) 8 - 22 mg/dL    Creatinine 1.21 0.50 - 1.40 mg/dL    Calcium 8.0 (L) 8.5 - 10.5 mg/dL    Correct Calcium 8.4 (L) 8.5 - 10.5 mg/dL    AST(SGOT) 28 12 - 45 U/L    ALT(SGPT) 15 2 - 50 U/L    Alkaline Phosphatase 79 30 - 99 U/L    Total Bilirubin 0.4 0.1 - 1.5 mg/dL    Albumin 3.5 3.2 - 4.9 g/dL    Total Protein 5.5 (L) 6.0 - 8.2 g/dL    Globulin 2.0 1.9 - 3.5 g/dL    A-G Ratio 1.8 g/dL   DIAGNOSTIC ALCOHOL    Collection Time: 04/08/25  8:40 PM   Result Value Ref Range    Diagnostic Alcohol 208.0 (H) <10.1 mg/dL   MAGNESIUM    Collection Time: 04/08/25  8:40 PM   Result Value Ref Range    Magnesium 2.0 1.5 - 2.5 mg/dL   TSH WITH REFLEX TO FT4    Collection Time: 04/08/25  8:40 PM   Result Value Ref Range    TSH 4.940 0.380 - 5.330 uIU/mL   ESTIMATED GFR    Collection Time: 04/08/25  8:40 PM   Result Value Ref Range    GFR (CKD-EPI) 43 (A) >60 mL/min/1.73 m 2   PLATELET MAPPING WITH BASIC TEG    Collection Time: 04/08/25 10:13 PM   Result Value Ref Range    Reaction Time Initial-R 4.4 (L) 4.6 - 9.1 min    React Time Initial Hep 4.1 (L) 4.3 - 8.3 min    Clot Kinetics-K 1.0 0.8 - 2.1 min    Clot Angle-Angle 75.3 63.0 - 78.0 degrees    Maximum Clot Strength-MA 62.0 52.0 - 69.0 mm    TEG Functional Fibrinogen(MA) 18.4 15.0 - 32.0 mm    % Inhibition ADP 60.9 (H) 0.0 - 17.0 %    % Inhibition AA 21.2 (H) 0.0 - 11.0 %    TEG Algorithm Link Algorithm    EKG    Collection Time: 04/08/25 10:21 PM   Result Value Ref Range    Report       [facility]    Test Date:  2025-04-08  Pt  Name:    ZULEIMA COLE                  Department: ER  MRN:        [id number]                      Room:       [facility]  Gender:     Female                       Technician: [id number]  :        [id number]                   Requested By:PALLAVI HATFIELD  Order #:    [id number]                    Reading MD: Pallavi Hatfield    Measurements  Intervals                                Axis  Rate:       47                           P:          59  AL:         175                          QRS:        69  QRSD:       104                          T:          65  QT:         548  QTc:        485    Interpretive Statements  Sinus bradycardia  Low voltage, precordial leads  Compared to ECG 2024 23:11:06  Low QRS voltage now present  Sinus rhythm no longer present  Prolonged QT interval no longer present  Electronically Signed On 2025 22:21:16 PDT by Pallavi Hatfield     POCT glucose device results    Collection Time: 25  1:12 AM   Result Value Ref Range    POC Glucose, Blood 102 (H) 65 - 99 mg/dL   CBC with Differential: Tomorrow AM    Collection Time: 25  3:15 AM   Result Value Ref Range    WBC 9.3 4.8 - 10.8 K/uL    RBC 3.99 (L) 4.20 - 5.40 M/uL    Hemoglobin 11.5 (L) 12.0 - 16.0 g/dL    Hematocrit 35.3 (L) 37.0 - 47.0 %    MCV 88.5 81.4 - 97.8 fL    MCH 28.8 27.0 - 33.0 pg    MCHC 32.6 32.2 - 35.5 g/dL    RDW 46.2 35.9 - 50.0 fL    Platelet Count 276 164 - 446 K/uL    MPV 9.9 9.0 - 12.9 fL    Neutrophils-Polys 82.20 (H) 44.00 - 72.00 %    Lymphocytes 11.50 (L) 22.00 - 41.00 %    Monocytes 5.20 0.00 - 13.40 %    Eosinophils 0.30 0.00 - 6.90 %    Basophils 0.30 0.00 - 1.80 %    Immature Granulocytes 0.50 0.00 - 0.90 %    Nucleated RBC 0.00 0.00 - 0.20 /100 WBC    Neutrophils (Absolute) 7.62 (H) 1.82 - 7.42 K/uL    Lymphs (Absolute) 1.07 1.00 - 4.80 K/uL    Monos (Absolute) 0.48 0.00 - 0.85 K/uL    Eos (Absolute) 0.03 0.00 - 0.51 K/uL    Baso (Absolute) 0.03 0.00 - 0.12 K/uL    Immature Granulocytes (abs) 0.05 0.00 - 0.11  K/uL    NRBC (Absolute) 0.00 K/uL   Comp Metabolic Panel (CMP): Tomorrow AM    Collection Time: 04/09/25  3:15 AM   Result Value Ref Range    Sodium 133 (L) 135 - 145 mmol/L    Potassium 4.0 3.6 - 5.5 mmol/L    Chloride 103 96 - 112 mmol/L    Co2 19 (L) 20 - 33 mmol/L    Anion Gap 11.0 7.0 - 16.0    Glucose 99 65 - 99 mg/dL    Bun 19 8 - 22 mg/dL    Creatinine 1.00 0.50 - 1.40 mg/dL    Calcium 7.8 (L) 8.5 - 10.5 mg/dL    Correct Calcium 8.4 (L) 8.5 - 10.5 mg/dL    AST(SGOT) 33 12 - 45 U/L    ALT(SGPT) 14 2 - 50 U/L    Alkaline Phosphatase 83 30 - 99 U/L    Total Bilirubin <0.2 0.1 - 1.5 mg/dL    Albumin 3.3 3.2 - 4.9 g/dL    Total Protein 5.5 (L) 6.0 - 8.2 g/dL    Globulin 2.2 1.9 - 3.5 g/dL    A-G Ratio 1.5 g/dL   Heparin Anti-Xa    Collection Time: 04/09/25  3:15 AM   Result Value Ref Range    Heparin Xa (UFH) <0.10 IU/mL   ESTIMATED GFR    Collection Time: 04/09/25  3:15 AM   Result Value Ref Range    GFR (CKD-EPI) 54 (A) >60 mL/min/1.73 m 2       Fluids    Intake/Output Summary (Last 24 hours) at 4/9/2025 1427  Last data filed at 4/9/2025 1407  Gross per 24 hour   Intake 1416.61 ml   Output 210 ml   Net 1206.61 ml       Core Measures & Quality Metrics  Labs reviewed, Radiology images reviewed and Medications reviewed                    BECKI Score   Blood Alcohol>0.08: yes       Assessment/Plan  * Trauma- (present on admission)  Assessment & Plan  Ground level fall.  T-5000 Activation.  Noam Evangelista MD. Trauma Surgery.    Pain of neck with recent traumatic injury- (present on admission)  Assessment & Plan   Complaints of neck pain.   Unable to clear C-spine due to intoxication.  CT C-spine inconclusive due to chronic degenerative changes.  Continue C-collar until sober and rexamine    Right wrist fracture, closed, initial encounter- (present on admission)  Assessment & Plan   Distal right metaphyseal fracture.  Non-operative management.  Closed reduction and splinted in ED.  Weight bearing status -  Nonweightbearing RUE.  Eladio Ervin MD. Orthopedic Surgeon. [facility].    Subdural hematoma, post-traumatic (HCC)- (present on admission)  Assessment & Plan   2.9mm right parietal subdural hematoma.  mBIG 2.  Interval head CT stable. Consider Lovenox in 24 hours  Non-operative management.  Post traumatic pharmacologic seizure prophylaxis for 1 week.  Speech Language Pathology cognitive evaluation.  Neurosurgery consultation not indicated.    Abrasion, face without infection- (present on admission)  Assessment & Plan  CT max-face without fracture.  Topical care.    Chronic hyponatremia- (present on admission)  Assessment & Plan  Serum Na 132, baseline 127-134 in setting of alcoholism.  Gentle IVF.   Trend.    Alcohol abuse- (present on admission)  Assessment & Plan  Admission blood alcohol level of 208mg/dL.  Trauma alcohol withdrawal protocol initiated.  PO vitamins.  SBIRT.    Contraindication to deep vein thrombosis (DVT) prophylaxis- (present on admission)  Assessment & Plan  VTE prophylaxis initially contraindicated secondary to elevated bleeding risk.  4/8 Trauma surveillance venous duplex ultrasonography ordered.    Stage 3a chronic kidney disease- (present on admission)  Assessment & Plan   Cr 1.21 from baseline 0.9.   GFR 43.  Avoid nephrotoxins.   Renally dose medications.  Trend renal indices.   Gentle hydration.    Encounter for geriatric assessment- (present on admission)  Assessment & Plan  4/9 The patient is 75 years old or older and a geriatrics consult is indicated  Santi Rawls MD, Geriatric Hospitalist.    Dyslipidemia- (present on admission)  Assessment & Plan  Chronic condition treated with simvastatin.  Resume pending medication reconciliation.    Tobacco abuse- (present on admission)  Assessment & Plan  Nicotine replacement.  Cessation education.    Essential hypertension- (present on admission)  Assessment & Plan  Chronic condition treated with lasix and lisinopril.  Resume  pending medication reconciliation.  Monitor serum creatinine and serum sodium trend prior to initiating.         Discussed patient condition with RN, RT, and Pharmacy.  CRITICAL CARE TIME EXCLUDING PROCEDURES:  33  minutes.Decision making of high complexity.  I reviewed clinical labs, trends and orders for follow up.  Review of imaging,reports, consultant documentation .  Utilization of the information in todays decision making.   I evaluated the patient condition at bedside and discussed the daily plan(s) with available nursing staff,  pharmacists on rounds.

## 2025-04-09 NOTE — THERAPY
04/09/25 1016   Interdisciplinary Plan of Care Collaboration   Collaboration Comments OT consult received. Pt pending ORIF R wrist. Will hold and follow for appropriate timing of eval.

## 2025-04-09 NOTE — ED PROVIDER NOTES
ER Provider Note    Scribed for Dr. David Sorensno MD. by Abel Husain. 4/8/2025  8:38 PM    Primary Care Provider: RONNI Crane    CHIEF COMPLAINT  Chief Complaint   Patient presents with    GLF     Patient BIBA from [facility]. Per EMS patient was drinking ETOH and was walking to her vehicle when she had tripped and fallen onto her right side. Patient has obvious laceration to right eye brow and right cheek. +LOC unknown duration, +HS, -thinners    Wrist Injury     Patient presents with right wrist tenderness and airsplint from EMS.       EXTERNAL RECORDS REVIEWED  Inpatient Notes The patient was last admitted on 12/21/23 for syncope and collapse.    HPI/ROS  LIMITATION TO HISTORY   Select: : None    OUTSIDE HISTORIAN(S):  EMS provided history regarding the patient's fall and her status on scene and en route to the ED.    Lily Lloyd is a 89 y.o. female who presents to the ED via EMS as a code TBI for evaluation of injuries secondary to a ground level fall onset prior to arrival. Per EMS, the patient was [place] alcohol at a [place] in [facility] when she fell outside, hitting her face on the ground. Bystanders report the patient was unconscious for an unknown amount of time. Patient was found to be hypotensive at 78/45 and alert and oriented to person and place only on scene. She is complaining of right wrist pain with obvious deformity, neck pain, and abrasions that are bleeding to the right side of her face. The patient denies taking any blood thinners. She reports she does take medication for her blood pressure that she did take today.    PAST MEDICAL HISTORY  Past Medical History:   Diagnosis Date    Arthritis     every where    Breath shortness     EXERCISE    CATARACT     Diabetes     Told pre-diabetic 2 yrs ago, diet controlled    Heart burn     takes protonix occasionally    Hiatus hernia syndrome     Hypertension     controlled on meds    Indigestion     Acid Reflux    Pain     Renal  disorder     currently being worked up.     Snoring     Does not want to have evaluated at this time       SURGICAL HISTORY  Past Surgical History:   Procedure Laterality Date    MANUEL HOLES Left 4/3/2023    Procedure: CREATION, CRANIAL MANUEL HOLE;  Surgeon: Shmuel Rodriguez M.D.;  Location: SURGERY [facility];  Service: Neurosurgery    CATARACT PHACO WITH IOL  8/20/2013    Performed by Alejandro Peres M.D. at SURGERY [facility] ORS    CATARACT PHACO WITH IOL  8/6/2013    Performed by Alejandro Peres M.D. at SURGERY [facility] ORS    SINUSOTOMIES  12/20/2012    Performed by Hattie Rolle M.D. at SURGERY SAME DAY [facility] ORS    NASAL POLYPECTOMY  12/20/2012    Performed by Hattie Rolle M.D. at SURGERY SAME DAY [facility] ORS    NASAL POLYPECTOMY  8/19/2010    Performed by HATTIE ROLLE at SURGERY [facility]    ETHMOIDECTOMY  8/19/2010    Performed by HATTIE ROLLE at SURGERY SAME DAY [facility] ORS    ANTROSTOMY  8/19/2010    Performed by HATTIE ROLLE at SURGERY [facility] ORS    ABDOMINAL HYSTERECTOMY TOTAL      APPENDECTOMY      KNEE ARTHROPLASTY TOTAL      right    OOPHORECTOMY         FAMILY HISTORY  Family History   Problem Relation Age of Onset    Cancer Sister         breast cancer    Cancer Paternal Aunt         breast cancer    Diabetes Father     Cancer Sister        SOCIAL HISTORY   reports that she has been smoking cigarettes. She has a 25 pack-year smoking history. She uses smokeless tobacco. She reports current alcohol use of about 1.0 oz of alcohol per week. She reports that she does not use drugs.    CURRENT MEDICATIONS  Previous Medications    DICLOFENAC SODIUM (VOLTAREN) 1 % GEL    Apply 2 g topically 4 times a day as needed (Pain in knee and or shoulder).    FLUTICASONE (FLONASE) 50 MCG/ACT NASAL SPRAY    Administer 1 Spray into affected nostril(S) every day.    FOLIC ACID (FOLVITE) 1 MG TAB    Take 1 Tablet by mouth every day.    FUROSEMIDE (LASIX) 40 MG TAB  "   Take 40 mg by mouth every day.    LISINOPRIL (PRINIVIL) 10 MG TAB    Take 1 Tablet by mouth every day.    LORATADINE (CLARITIN) 10 MG TAB    Take 10 mg by mouth every day.    MULTIVITAMIN TAB    Take 1 Tablet by mouth every day.    POTASSIUM CHLORIDE SA (KDUR) 20 MEQ TAB CR    Take 1 Tablet by mouth every day.    SIMVASTATIN (ZOCOR) 10 MG TAB    Take 10 mg by mouth every evening.    THIAMINE (THIAMINE) 100 MG TABLET    Take 1 Tablet by mouth every day.       ALLERGIES  Pcn [penicillins], Sulfa drugs, and Tape    PHYSICAL EXAM  BP (!) 82/52   Pulse (!) 46   Temp 36.4 °C (97.6 °F)   Resp (!) 21   Ht 1.575 m (5' 2\")   Wt 59 kg (130 lb)   SpO2 95%   BMI 23.78 kg/m²   Physical Exam  Vitals and nursing note reviewed.   Constitutional:       Appearance: She is well-developed.   HENT:      Head: Normocephalic.      Comments: 2 cm laceration over the right lateral eyebrow, Skin tear over the right maxillary region with mild soft tissue swelling over the right maxilla.  Eyes:      Extraocular Movements: Extraocular movements intact.      Pupils: Pupils are equal, round, and reactive to light.   Cardiovascular:      Rate and Rhythm: Normal rate and regular rhythm.   Pulmonary:      Effort: Pulmonary effort is normal.      Breath sounds: Normal breath sounds.   Chest:      Chest wall: No tenderness.   Abdominal:      Palpations: Abdomen is soft.      Tenderness: There is no abdominal tenderness.   Musculoskeletal:         General: Deformity (dinner fork of right wrist) present.      Cervical back: Normal range of motion. Tenderness (midline cervical spine) present.   Neurological:      Mental Status: She is alert and oriented to person, place, and time.       DIAGNOSTIC STUDIES & PROCEDURES    Labs:   Results for orders placed or performed during the hospital encounter of 04/08/25   CBC WITH DIFFERENTIAL    Collection Time: 04/08/25  8:40 PM   Result Value Ref Range    WBC 6.5 4.8 - 10.8 K/uL    RBC 4.01 (L) 4.20 - " 5.40 M/uL    Hemoglobin 12.0 12.0 - 16.0 g/dL    Hematocrit 35.3 (L) 37.0 - 47.0 %    MCV 88.0 81.4 - 97.8 fL    MCH 29.9 27.0 - 33.0 pg    MCHC 34.0 32.2 - 35.5 g/dL    RDW 47.1 35.9 - 50.0 fL    Platelet Count 286 164 - 446 K/uL    MPV 9.4 9.0 - 12.9 fL    Neutrophils-Polys 46.90 44.00 - 72.00 %    Lymphocytes 37.70 22.00 - 41.00 %    Monocytes 8.60 0.00 - 13.40 %    Eosinophils 5.20 0.00 - 6.90 %    Basophils 1.10 0.00 - 1.80 %    Immature Granulocytes 0.50 0.00 - 0.90 %    Nucleated RBC 0.00 0.00 - 0.20 /100 WBC    Neutrophils (Absolute) 3.04 1.82 - 7.42 K/uL    Lymphs (Absolute) 2.44 1.00 - 4.80 K/uL    Monos (Absolute) 0.56 0.00 - 0.85 K/uL    Eos (Absolute) 0.34 0.00 - 0.51 K/uL    Baso (Absolute) 0.07 0.00 - 0.12 K/uL    Immature Granulocytes (abs) 0.03 0.00 - 0.11 K/uL    NRBC (Absolute) 0.00 K/uL   COMP METABOLIC PANEL    Collection Time: 04/08/25  8:40 PM   Result Value Ref Range    Sodium 132 (L) 135 - 145 mmol/L    Potassium 3.6 3.6 - 5.5 mmol/L    Chloride 99 96 - 112 mmol/L    Co2 20 20 - 33 mmol/L    Anion Gap 13.0 7.0 - 16.0    Glucose 103 (H) 65 - 99 mg/dL    Bun 23 (H) 8 - 22 mg/dL    Creatinine 1.21 0.50 - 1.40 mg/dL    Calcium 8.0 (L) 8.5 - 10.5 mg/dL    Correct Calcium 8.4 (L) 8.5 - 10.5 mg/dL    AST(SGOT) 28 12 - 45 U/L    ALT(SGPT) 15 2 - 50 U/L    Alkaline Phosphatase 79 30 - 99 U/L    Total Bilirubin 0.4 0.1 - 1.5 mg/dL    Albumin 3.5 3.2 - 4.9 g/dL    Total Protein 5.5 (L) 6.0 - 8.2 g/dL    Globulin 2.0 1.9 - 3.5 g/dL    A-G Ratio 1.8 g/dL   DIAGNOSTIC ALCOHOL    Collection Time: 04/08/25  8:40 PM   Result Value Ref Range    Diagnostic Alcohol 208.0 (H) <10.1 mg/dL   MAGNESIUM    Collection Time: 04/08/25  8:40 PM   Result Value Ref Range    Magnesium 2.0 1.5 - 2.5 mg/dL   TSH WITH REFLEX TO FT4    Collection Time: 04/08/25  8:40 PM   Result Value Ref Range    TSH 4.940 0.380 - 5.330 uIU/mL   ESTIMATED GFR    Collection Time: 04/08/25  8:40 PM   Result Value Ref Range    GFR (CKD-EPI) 43  (A) >60 mL/min/1.73 m 2   PLATELET MAPPING WITH BASIC TEG    Collection Time: 25 10:13 PM   Result Value Ref Range    Reaction Time Initial-R 4.4 (L) 4.6 - 9.1 min    React Time Initial Hep 4.1 (L) 4.3 - 8.3 min    Clot Kinetics-K 1.0 0.8 - 2.1 min    Clot Angle-Angle 75.3 63.0 - 78.0 degrees    Maximum Clot Strength-MA 62.0 52.0 - 69.0 mm    TEG Functional Fibrinogen(MA) 18.4 15.0 - 32.0 mm    % Inhibition ADP 60.9 (H) 0.0 - 17.0 %    % Inhibition AA 21.2 (H) 0.0 - 11.0 %    TEG Algorithm Link Algorithm    EKG    Collection Time: 25 10:21 PM   Result Value Ref Range    Report       [facility] Emergency Dept.    Test Date:  2025  Pt Name:    ZULEIMA COLE                  Department: ER  MRN:        [id number]                      Room:       [facility]  Gender:     Female                       Technician: [id number]  :        [id number]                   Requested By:PALLAVI HATFIELD  Order #:    [id number]                    Reading MD: Pallavi Hatfield    Measurements  Intervals                                Axis  Rate:       47                           P:          59  MD:         175                          QRS:        69  QRSD:       104                          T:          65  QT:         548  QTc:        485    Interpretive Statements  Sinus bradycardia  Low voltage, precordial leads  Compared to ECG 2024 23:11:06  Low QRS voltage now present  Sinus rhythm no longer present  Prolonged QT interval no longer present  Electronically Signed On 2025 22:21:16 PDT by Pallavi Hatfield        All labs reviewed by me.    EKG:   I have independently interpreted this EKG as shown above.     Radiology:   The attending Emergency Physician has independently interpreted the diagnostic imaging associated with this visit and is awaiting the final reading from the radiologist, which will be displayed below.    Preliminary interpretation is a follows: X-ray of right wrist shows displaced distal radius  fracture.  Postreduction x-ray shows minimal change in displaced radial fracture.  CT C-spine no acute process CT head with 2.9 mm right parietal subdural hematoma.  Radiologist interpretation:    DX-WRIST-COMPLETE 3+ RIGHT   Final Result         1.  Distal radial metaphyseal fracture      CT-CSPINE WITHOUT PLUS RECONS   Final Result         1.  Multilevel degenerative changes of the cervical spine limit diagnostic sensitivity of this examination, otherwise no acute traumatic bony injury of the cervical spine is apparent.   2.  Atherosclerosis      CT-HEAD W/O   Final Result         1.  2.9 mm right parietal subdural hematoma.   2.  Calcified mass abutting the anterior right falx, appearance compatible with meningioma.   3.  Bilateral maxillary and ethmoid sinusitis changes   4.  Nonspecific white matter changes, commonly associated with small vessel ischemic disease.  Associated mild cerebral atrophy is noted.      Based solely on CT findings, the brain injury guideline category is mBIG 1.      SDH < 4mm   IPH < 4mm   SAH < 3 sulci and < 1mm      The original BIG retrospective analysis found radiographic progression in 0% of BIG 1 patients and 2.6% BIG 2.      These findings were discussed with the patient's clinician, David Sorenson, on 4/8/2025 9:33 PM.      US-TRAUMA VEIN SCREEN LOWER BILAT EXTREMITY    (Results Pending)   CT-HEAD W/O    (Results Pending)   CT-MAXILLOFACIAL W/O PLUS RECONS    (Results Pending)   DX-WRIST-COMPLETE 3+ RIGHT    (Results Pending)      PROCEDURES    Hematoma Block  Verbal consent obtained  Patient placed in the appropriate position skin prepped with chlorhexidine swab  22-gauge needle advanced into the dorsal distal radius using anatomic landmarks.  The syringe was drawn and bloody flash was obtained into the syringe.  10 cc of 0.5% bupivacaine was then injected into the hematoma.  The patient tolerated the procedure well good anesthesia was achieved    Fracture Reduction  Procedure    Indication: fracture    Consent: The patient was counseled regarding the procedure, it's indications, risks, potential complications and alternatives and any questions were answered. Consent was obtained.    Procedure: The pre-reduction exam showed distal perfusion & neurologic function to be normal. The patient was placed in the appropriate position. Anesthesia/pain control was obtained using a hematoma block of the affected area using 10.0 cc of 0.5% Bupivacaine without epinephrine. Reduction of the right distal radius was performed by traction and counter traction. Post reduction films were obtained and revealed partial but satisfactory reduction. A post-reduction exam revealed distal perfusion & neurologic function to be normal. The affected area was immobilized with fiberglass sugar-tong splint.    The patient tolerated the procedure well.    Complications: None        CRITICAL CARE  The very real possibility of a deterioration of this patient's condition required the highest level of my preparedness for sudden, emergent intervention.  I provided critical care services, which included medication orders, frequent reevaluations of the patient's condition and response to treatment, ordering and reviewing test results, and discussing the case with various consultants.  The critical care time associated with the care of the patient was 35 minutes. Review chart for interventions. This time is exclusive of any other billable procedures.      COURSE & MEDICAL DECISION MAKING    INITIAL ASSESSMENT AND PLAN  Care Narrative:           8:38 PM - Patient seen and evaluated at bedside.     9:51 PM - I reevaluated the patient at bedside and informed her she does have a distal right radial metaphyseal fracture as well as a small 2.9 mm right parietal subdural hematoma. Given this, [facility] and [facility].     9:56 PM - I discussed the patient's case and the  above findings with Dr. Ervin (orthopedics) who will see the patient.    10:00 PM - I discussed the patient's case and the above findings with Dr. Evangelista (surgery) who agrees to admit the patient.      10:49 PM - I reevaluated the patient at bedside and performed a hematoma block using Marcaine 0.5% injection as outlined above.     ADDITIONAL PROBLEM LIST AND DISPOSITION  89-year-old female presenting as a TBI after she was intoxicated have a ground-level fall in the parking lot of the [employer].  She arrives with abrasions to the face and obvious deformity of the right wrist otherwise mentating appropriately.  CT scans obtained which do show a small subdural hematoma.  Trauma surgery consulted for this and kindly agreed to admit for further observation.  Orthopedic surgery was consulted for a right Colles' fracture that was found by x-ray and they recommended nonoperative management and bedside reduction.  Reduction was performed as noted above with poor realignment however tolerable at this time.  Patient was then admitted by trauma surgery for further management.               DISPOSITION AND DISCUSSIONS  I have discussed management of the patient with the following physicians and ARIAN's: Dr. Ervin (orthopedics), Dr. Evangelista (trauma surgery)    Discussion of management with other [facility] or appropriate source(s): None     DISPOSITION:  Patient will be hospitalized by Dr. Evangelista (surgery) in critical condition.     FINAL IMPRESSION   1. Closed Colles' fracture of right radius, initial encounter    2. SDH (subdural hematoma) (HCC)    3. Alcoholic intoxication with complication (HCC)    4. Sinus bradycardia         IAbel (Scribparveen), am scribing for, and in the presence of, No att. providers found.    Electronically signed by: Abel Husain (Colleen), 4/8/2025    I, No att. providers found personally performed the services described in this documentation, as scribed by Abel Husain in my presence, and it is  both accurate and complete.    The note accurately reflects work and decisions made by me.  David Sorenson M.D.  4/9/2025  12:01 AM

## 2025-04-09 NOTE — ED NOTES
Patient attempting to get out of bed. This RN encouraged patient to stay in bed. Patient reoriented. Placed on bed alarm.

## 2025-04-09 NOTE — ANESTHESIA POSTPROCEDURE EVALUATION
Patient: Lily Lloyd    Procedure Summary       Date: 04/09/25 Room / Location: [place] [address] / SURGERY [facility]    Anesthesia Start: 1219 Anesthesia Stop: 1316    Procedure: OPEN REDUCTION INTERNAL FIXATION OF RIGHT DISTAL RADIUS (Right: Wrist) Diagnosis: (right distal radius fracture)    Surgeons: Eladio Ervin M.D. Responsible Provider: Dallas Jacobson III, M.D.    Anesthesia Type: general ASA Status: 3            Final Anesthesia Type: general  Last vitals  BP   Blood Pressure : (!) 153/65    Temp   36 °C (96.8 °F)    Pulse   (!) 55   Resp   12    SpO2   (!) 86 %      Anesthesia Post Evaluation    Patient location during evaluation: PACU  Patient participation: complete - patient participated  Level of consciousness: awake and alert  Pain score: 6    Airway patency: patent  Anesthetic complications: no  Cardiovascular status: hemodynamically stable  Respiratory status: acceptable  Hydration status: euvolemic    PONV: none          No notable events documented.     Nurse Pain Score: 7 (NPRS)

## 2025-04-09 NOTE — THERAPY
Physical Therapy Contact Note    Patient Name: Lily Lloyd  Age:  89 y.o., Sex:  female  Medical Record #: [id number]  Today's Date: 4/9/2025    PT consult received, eval deferred as pt pending ORIF R wrist today. Will initiate PT evaluation post operatively as appropriate to best assess CLOF and DC needs.     Dori Morgan, PT, DPT    [employer]te [id number]

## 2025-04-09 NOTE — OR NURSING
Pre-op process completed. Waiting for md's. Patient resting, kept warm on bear paw.  Pending Consents  PIV dressing changed by Thu , rn  Patient is on c - collar , alert orientyed x 4 .Call light within reach.

## 2025-04-09 NOTE — ASSESSMENT & PLAN NOTE
Distal right metaphyseal fracture.  Closed reduction and splinted in ED.  4/10 Open reduction term fixation right extra-articular distal radius fracture.  Weight bearing status - Okay to use the RUE for light activities under 1 or 2 pounds such as ADLs. Continue to use the wrist brace at all times. Work on range of motion of the fingers and elbow early and often.   Eladio Ervin MD. Orthopedic Surgeon. [facility].  Follow up in 2 weeks.

## 2025-04-09 NOTE — ANESTHESIA PROCEDURE NOTES
Airway    Date/Time: 4/9/2025 12:26 PM    Performed by: Dallas Jacobson III, M.D.  Authorized by: Dallas Jacobson III, M.D.    Location:  OR  Urgency:  Elective  Difficult Airway: No    Indications for Airway Management:  Anesthesia      Spontaneous Ventilation: absent    Sedation Level:  Deep  Preoxygenated: Yes    Final Airway Type:  Supraglottic airway  Final Supraglottic Airway:  Standard LMA    SGA Size:  3  Number of Attempts at Approach:  1

## 2025-04-09 NOTE — ASSESSMENT & PLAN NOTE
2.9mm right parietal subdural hematoma.  mBIG 2.  Interval head CT stable.   Non-operative management.  Post traumatic pharmacologic seizure prophylaxis for 1 week.  Speech Language Pathology cognitive evaluation. No further recommendations on discharge  Neurosurgery consultation not indicated.

## 2025-04-09 NOTE — ASSESSMENT & PLAN NOTE
VTE prophylaxis initially contraindicated secondary to elevated bleeding risk.  4/9 Trauma screening bilateral lower extremity venous duplex negative for above knee DVT.  4/10 Prophylactic dose enoxaparin 30 mg BID initiated.   VTE Prophylaxis approved by Trauma Surgery.

## 2025-04-09 NOTE — ASSESSMENT & PLAN NOTE
Admission blood alcohol level of 208mg/dL.  Trauma alcohol withdrawal protocol initiated.  PO vitamins.  SBIRT completed. Patient drinks daily. Previous admissions for GLF secondary to intoxication

## 2025-04-09 NOTE — ED TRIAGE NOTES
Chief Complaint   Patient presents with    GLF     Patient BIBA from [facility]. Per EMS patient was drinking ETOH and was walking to her vehicle when she had tripped and fallen onto her right side. Patient has obvious laceration to right eye brow and right cheek. +LOC unknown duration, +HS, -thinners    Wrist Injury     Patient presents with right wrist tenderness and airsplint from EMS.     Per EMS patient was A&O2 on scene. Patient arrives with air splint to right wrist and C-collar. Radial pulses equal bilaterally.    Patient has 18 LAC attached to pressure bag. Patient xavier and hypotensive on arrival.

## 2025-04-09 NOTE — ED NOTES
"Patient asking this RN \"what's happening?\". This RN educated patient on location and situation. Patient saying \"I know.\" Education requires reinforcement.  "

## 2025-04-10 ENCOUNTER — APPOINTMENT (OUTPATIENT)
Dept: RADIOLOGY | Facility: MEDICAL CENTER | Age: 89
End: 2025-04-10
Payer: MEDICARE

## 2025-04-10 PROBLEM — E87.8 ELECTROLYTE ABNORMALITY: Status: ACTIVE | Noted: 2025-04-10

## 2025-04-10 PROBLEM — Z78.9 NO CONTRAINDICATION TO DEEP VEIN THROMBOSIS (DVT) PROPHYLAXIS: Status: ACTIVE | Noted: 2023-04-03

## 2025-04-10 PROBLEM — E83.42 HYPOMAGNESEMIA: Status: ACTIVE | Noted: 2025-04-10

## 2025-04-10 PROBLEM — S22.000A COMPRESSION FRACTURE OF THORACIC VERTEBRA (HCC): Status: ACTIVE | Noted: 2025-04-10

## 2025-04-10 LAB
25(OH)D3 SERPL-MCNC: 46 NG/ML (ref 30–100)
ALBUMIN SERPL BCP-MCNC: 3.2 G/DL (ref 3.2–4.9)
ALBUMIN/GLOB SERPL: 1.5 G/DL
ALP SERPL-CCNC: 77 U/L (ref 30–99)
ALT SERPL-CCNC: 11 U/L (ref 2–50)
ANION GAP SERPL CALC-SCNC: 9 MMOL/L (ref 7–16)
AST SERPL-CCNC: 27 U/L (ref 12–45)
BASOPHILS # BLD AUTO: 0.2 % (ref 0–1.8)
BASOPHILS # BLD: 0.01 K/UL (ref 0–0.12)
BILIRUB SERPL-MCNC: 0.3 MG/DL (ref 0.1–1.5)
BUN SERPL-MCNC: 22 MG/DL (ref 8–22)
CALCIUM ALBUM COR SERPL-MCNC: 9.2 MG/DL (ref 8.5–10.5)
CALCIUM SERPL-MCNC: 8.6 MG/DL (ref 8.5–10.5)
CHLORIDE SERPL-SCNC: 104 MMOL/L (ref 96–112)
CO2 SERPL-SCNC: 19 MMOL/L (ref 20–33)
CREAT SERPL-MCNC: 0.9 MG/DL (ref 0.5–1.4)
EOSINOPHIL # BLD AUTO: 0 K/UL (ref 0–0.51)
EOSINOPHIL NFR BLD: 0 % (ref 0–6.9)
ERYTHROCYTE [DISTWIDTH] IN BLOOD BY AUTOMATED COUNT: 46.4 FL (ref 35.9–50)
GFR SERPLBLD CREATININE-BSD FMLA CKD-EPI: 61 ML/MIN/1.73 M 2
GLOBULIN SER CALC-MCNC: 2.1 G/DL (ref 1.9–3.5)
GLUCOSE BLD STRIP.AUTO-MCNC: 114 MG/DL (ref 65–99)
GLUCOSE BLD STRIP.AUTO-MCNC: 140 MG/DL (ref 65–99)
GLUCOSE BLD STRIP.AUTO-MCNC: 141 MG/DL (ref 65–99)
GLUCOSE BLD STRIP.AUTO-MCNC: 175 MG/DL (ref 65–99)
GLUCOSE BLD STRIP.AUTO-MCNC: 93 MG/DL (ref 65–99)
GLUCOSE SERPL-MCNC: 120 MG/DL (ref 65–99)
HCT VFR BLD AUTO: 33.7 % (ref 37–47)
HGB BLD-MCNC: 11.1 G/DL (ref 12–16)
IMM GRANULOCYTES # BLD AUTO: 0.02 K/UL (ref 0–0.11)
IMM GRANULOCYTES NFR BLD AUTO: 0.4 % (ref 0–0.9)
LYMPHOCYTES # BLD AUTO: 0.65 K/UL (ref 1–4.8)
LYMPHOCYTES NFR BLD: 12.9 % (ref 22–41)
MAGNESIUM SERPL-MCNC: 1.8 MG/DL (ref 1.5–2.5)
MCH RBC QN AUTO: 29.4 PG (ref 27–33)
MCHC RBC AUTO-ENTMCNC: 32.9 G/DL (ref 32.2–35.5)
MCV RBC AUTO: 89.2 FL (ref 81.4–97.8)
MONOCYTES # BLD AUTO: 0.28 K/UL (ref 0–0.85)
MONOCYTES NFR BLD AUTO: 5.5 % (ref 0–13.4)
NEUTROPHILS # BLD AUTO: 4.09 K/UL (ref 1.82–7.42)
NEUTROPHILS NFR BLD: 81 % (ref 44–72)
NRBC # BLD AUTO: 0 K/UL
NRBC BLD-RTO: 0 /100 WBC (ref 0–0.2)
PHOSPHATE SERPL-MCNC: 4 MG/DL (ref 2.5–4.5)
PLATELET # BLD AUTO: 242 K/UL (ref 164–446)
PMV BLD AUTO: 10 FL (ref 9–12.9)
POTASSIUM SERPL-SCNC: 4.7 MMOL/L (ref 3.6–5.5)
PROT SERPL-MCNC: 5.3 G/DL (ref 6–8.2)
PTH-INTACT SERPL-MCNC: 71.8 PG/ML (ref 14–72)
RBC # BLD AUTO: 3.78 M/UL (ref 4.2–5.4)
SODIUM SERPL-SCNC: 132 MMOL/L (ref 135–145)
VIT B12 SERPL-MCNC: 561 PG/ML (ref 211–911)
WBC # BLD AUTO: 5.1 K/UL (ref 4.8–10.8)

## 2025-04-10 PROCEDURE — 82306 VITAMIN D 25 HYDROXY: CPT

## 2025-04-10 PROCEDURE — 97166 OT EVAL MOD COMPLEX 45 MIN: CPT

## 2025-04-10 PROCEDURE — 80053 COMPREHEN METABOLIC PANEL: CPT

## 2025-04-10 PROCEDURE — 700102 HCHG RX REV CODE 250 W/ 637 OVERRIDE(OP)

## 2025-04-10 PROCEDURE — 83970 ASSAY OF PARATHORMONE: CPT

## 2025-04-10 PROCEDURE — 97535 SELF CARE MNGMENT TRAINING: CPT

## 2025-04-10 PROCEDURE — 99232 SBSQ HOSP IP/OBS MODERATE 35: CPT | Performed by: FAMILY MEDICINE

## 2025-04-10 PROCEDURE — 700111 HCHG RX REV CODE 636 W/ 250 OVERRIDE (IP)

## 2025-04-10 PROCEDURE — A9270 NON-COVERED ITEM OR SERVICE: HCPCS

## 2025-04-10 PROCEDURE — 99233 SBSQ HOSP IP/OBS HIGH 50: CPT

## 2025-04-10 PROCEDURE — 700111 HCHG RX REV CODE 636 W/ 250 OVERRIDE (IP): Performed by: SURGERY

## 2025-04-10 PROCEDURE — 84100 ASSAY OF PHOSPHORUS: CPT

## 2025-04-10 PROCEDURE — 72128 CT CHEST SPINE W/O DYE: CPT

## 2025-04-10 PROCEDURE — 83735 ASSAY OF MAGNESIUM: CPT

## 2025-04-10 PROCEDURE — 99232 SBSQ HOSP IP/OBS MODERATE 35: CPT | Performed by: SURGERY

## 2025-04-10 PROCEDURE — 82607 VITAMIN B-12: CPT

## 2025-04-10 PROCEDURE — 700101 HCHG RX REV CODE 250

## 2025-04-10 PROCEDURE — 770006 HCHG ROOM/CARE - MED/SURG/GYN SEMI*

## 2025-04-10 PROCEDURE — 85025 COMPLETE CBC W/AUTO DIFF WBC: CPT

## 2025-04-10 PROCEDURE — 82962 GLUCOSE BLOOD TEST: CPT | Mod: 91

## 2025-04-10 PROCEDURE — 72141 MRI NECK SPINE W/O DYE: CPT

## 2025-04-10 RX ORDER — MAGNESIUM SULFATE HEPTAHYDRATE 40 MG/ML
2 INJECTION, SOLUTION INTRAVENOUS ONCE
Status: COMPLETED | OUTPATIENT
Start: 2025-04-10 | End: 2025-04-10

## 2025-04-10 RX ORDER — INSULIN LISPRO 100 [IU]/ML
1-6 INJECTION, SOLUTION INTRAVENOUS; SUBCUTANEOUS
Status: DISCONTINUED | OUTPATIENT
Start: 2025-04-10 | End: 2025-04-11 | Stop reason: HOSPADM

## 2025-04-10 RX ORDER — DEXTROSE MONOHYDRATE 25 G/50ML
25 INJECTION, SOLUTION INTRAVENOUS
Status: DISCONTINUED | OUTPATIENT
Start: 2025-04-10 | End: 2025-04-11 | Stop reason: HOSPADM

## 2025-04-10 RX ORDER — ENOXAPARIN SODIUM 100 MG/ML
30 INJECTION SUBCUTANEOUS EVERY 12 HOURS
Status: DISCONTINUED | OUTPATIENT
Start: 2025-04-10 | End: 2025-04-11 | Stop reason: HOSPADM

## 2025-04-10 RX ADMIN — ACETAMINOPHEN 1000 MG: 500 TABLET, FILM COATED ORAL at 18:48

## 2025-04-10 RX ADMIN — LISINOPRIL 10 MG: 10 TABLET ORAL at 05:11

## 2025-04-10 RX ADMIN — POLYETHYLENE GLYCOL 3350 1 PACKET: 17 POWDER, FOR SOLUTION ORAL at 18:51

## 2025-04-10 RX ADMIN — DOCUSATE SODIUM 100 MG: 100 CAPSULE, LIQUID FILLED ORAL at 18:46

## 2025-04-10 RX ADMIN — OXYCODONE HYDROCHLORIDE 5 MG: 5 TABLET ORAL at 10:15

## 2025-04-10 RX ADMIN — Medication 1 EACH: at 18:53

## 2025-04-10 RX ADMIN — MAGNESIUM SULFATE IN WATER FOR 2 G: 40 INJECTION INTRAVENOUS at 10:12

## 2025-04-10 RX ADMIN — ACETAMINOPHEN 1000 MG: 500 TABLET, FILM COATED ORAL at 11:14

## 2025-04-10 RX ADMIN — Medication 1 EACH: at 11:14

## 2025-04-10 RX ADMIN — THERA TABS 1 TABLET: TAB at 05:10

## 2025-04-10 RX ADMIN — DOCUSATE SODIUM 100 MG: 100 CAPSULE, LIQUID FILLED ORAL at 05:11

## 2025-04-10 RX ADMIN — ACETAMINOPHEN 1000 MG: 500 TABLET, FILM COATED ORAL at 05:10

## 2025-04-10 RX ADMIN — GABAPENTIN 100 MG: 100 CAPSULE ORAL at 05:10

## 2025-04-10 RX ADMIN — GABAPENTIN 100 MG: 100 CAPSULE ORAL at 21:01

## 2025-04-10 RX ADMIN — Medication 100 MG: at 05:10

## 2025-04-10 RX ADMIN — GABAPENTIN 100 MG: 100 CAPSULE ORAL at 13:13

## 2025-04-10 RX ADMIN — METAXALONE 400 MG: 800 TABLET ORAL at 05:10

## 2025-04-10 RX ADMIN — INSULIN LISPRO 1 UNITS: 100 INJECTION, SOLUTION INTRAVENOUS; SUBCUTANEOUS at 11:15

## 2025-04-10 RX ADMIN — MAGNESIUM HYDROXIDE 30 ML: 2400 SUSPENSION ORAL at 18:50

## 2025-04-10 RX ADMIN — NICOTINE 14 MG: 14 PATCH TRANSDERMAL at 05:11

## 2025-04-10 RX ADMIN — LEVETIRACETAM 500 MG: 500 TABLET, FILM COATED ORAL at 18:46

## 2025-04-10 RX ADMIN — SIMVASTATIN 10 MG: 20 TABLET, FILM COATED ORAL at 18:49

## 2025-04-10 RX ADMIN — FOLIC ACID 1 MG: 1 TABLET ORAL at 05:10

## 2025-04-10 RX ADMIN — Medication 1 EACH: at 05:14

## 2025-04-10 RX ADMIN — METAXALONE 400 MG: 800 TABLET ORAL at 18:49

## 2025-04-10 RX ADMIN — ENOXAPARIN SODIUM 30 MG: 100 INJECTION SUBCUTANEOUS at 18:51

## 2025-04-10 RX ADMIN — LEVETIRACETAM 500 MG: 500 TABLET, FILM COATED ORAL at 05:10

## 2025-04-10 ASSESSMENT — LIFESTYLE VARIABLES
AGITATION: NORMAL ACTIVITY
PAROXYSMAL SWEATS: NO SWEAT VISIBLE
PAROXYSMAL SWEATS: NO SWEAT VISIBLE
AUDITORY DISTURBANCES: NOT PRESENT
TOTAL SCORE: 0
PAROXYSMAL SWEATS: NO SWEAT VISIBLE
ANXIETY: NO ANXIETY (AT EASE)
NAUSEA AND VOMITING: NO NAUSEA AND NO VOMITING
ANXIETY: NO ANXIETY (AT EASE)
TREMOR: NO TREMOR
VISUAL DISTURBANCES: NOT PRESENT
PAROXYSMAL SWEATS: NO SWEAT VISIBLE
ANXIETY: NO ANXIETY (AT EASE)
TOTAL SCORE: 0
NAUSEA AND VOMITING: NO NAUSEA AND NO VOMITING
TOTAL SCORE: 0
TREMOR: NO TREMOR
HEADACHE, FULLNESS IN HEAD: NOT PRESENT
TREMOR: NO TREMOR
NAUSEA AND VOMITING: NO NAUSEA AND NO VOMITING
AGITATION: NORMAL ACTIVITY
ORIENTATION AND CLOUDING OF SENSORIUM: ORIENTED AND CAN DO SERIAL ADDITIONS
ORIENTATION AND CLOUDING OF SENSORIUM: ORIENTED AND CAN DO SERIAL ADDITIONS
VISUAL DISTURBANCES: NOT PRESENT
TREMOR: NO TREMOR
HEADACHE, FULLNESS IN HEAD: NOT PRESENT
ORIENTATION AND CLOUDING OF SENSORIUM: ORIENTED AND CAN DO SERIAL ADDITIONS
AUDITORY DISTURBANCES: NOT PRESENT
HEADACHE, FULLNESS IN HEAD: NOT PRESENT
NAUSEA AND VOMITING: NO NAUSEA AND NO VOMITING
AGITATION: NORMAL ACTIVITY
VISUAL DISTURBANCES: NOT PRESENT
SUBSTANCE_ABUSE: 1
TOTAL SCORE: 0
VISUAL DISTURBANCES: NOT PRESENT
AUDITORY DISTURBANCES: NOT PRESENT
AUDITORY DISTURBANCES: NOT PRESENT
ANXIETY: NO ANXIETY (AT EASE)
AGITATION: NORMAL ACTIVITY
ORIENTATION AND CLOUDING OF SENSORIUM: ORIENTED AND CAN DO SERIAL ADDITIONS
HEADACHE, FULLNESS IN HEAD: NOT PRESENT

## 2025-04-10 ASSESSMENT — ENCOUNTER SYMPTOMS
DIARRHEA: 0
COUGH: 0
FLANK PAIN: 0
HEARTBURN: 0
SHORTNESS OF BREATH: 0
BLURRED VISION: 0
SPEECH CHANGE: 0
CHILLS: 0
DIAPHORESIS: 0
BACK PAIN: 1
NERVOUS/ANXIOUS: 0
PALPITATIONS: 0
NECK PAIN: 1
TINGLING: 0
ABDOMINAL PAIN: 0
FEVER: 0
MYALGIAS: 1
TREMORS: 0
SORE THROAT: 0
FOCAL WEAKNESS: 0
DIZZINESS: 0
VOMITING: 0
HALLUCINATIONS: 1
WEAKNESS: 1
NECK PAIN: 0
NAUSEA: 0
SENSORY CHANGE: 0
WEAKNESS: 0
HEADACHES: 0
WHEEZING: 0
MYALGIAS: 0

## 2025-04-10 ASSESSMENT — COGNITIVE AND FUNCTIONAL STATUS - GENERAL
HELP NEEDED FOR BATHING: A LOT
TOILETING: A LITTLE
MOVING FROM LYING ON BACK TO SITTING ON SIDE OF FLAT BED: A LITTLE
STANDING UP FROM CHAIR USING ARMS: A LITTLE
DRESSING REGULAR LOWER BODY CLOTHING: A LITTLE
HELP NEEDED FOR BATHING: A LITTLE
WALKING IN HOSPITAL ROOM: A LITTLE
MOBILITY SCORE: 18
MOVING TO AND FROM BED TO CHAIR: A LITTLE
SUGGESTED CMS G CODE MODIFIER DAILY ACTIVITY: CJ
DRESSING REGULAR LOWER BODY CLOTHING: A LOT
TOILETING: A LITTLE
PERSONAL GROOMING: A LITTLE
TURNING FROM BACK TO SIDE WHILE IN FLAT BAD: A LITTLE
SUGGESTED CMS G CODE MODIFIER MOBILITY: CK
DRESSING REGULAR UPPER BODY CLOTHING: A LITTLE
DAILY ACTIVITIY SCORE: 17
DAILY ACTIVITIY SCORE: 20
SUGGESTED CMS G CODE MODIFIER DAILY ACTIVITY: CK
DRESSING REGULAR UPPER BODY CLOTHING: A LITTLE
CLIMB 3 TO 5 STEPS WITH RAILING: A LITTLE

## 2025-04-10 ASSESSMENT — PAIN DESCRIPTION - PAIN TYPE
TYPE: ACUTE PAIN;SURGICAL PAIN
TYPE: ACUTE PAIN
TYPE: ACUTE PAIN;SURGICAL PAIN
TYPE: ACUTE PAIN
TYPE: ACUTE PAIN
TYPE: ACUTE PAIN;SURGICAL PAIN
TYPE: ACUTE PAIN;SURGICAL PAIN
TYPE: ACUTE PAIN

## 2025-04-10 ASSESSMENT — FIBROSIS 4 INDEX: FIB4 SCORE: 2.99

## 2025-04-10 ASSESSMENT — ACTIVITIES OF DAILY LIVING (ADL): TOILETING: INDEPENDENT

## 2025-04-10 NOTE — DISCHARGE PLANNING
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 Aubrie Garay  30     [facility] and given report of Pt's statements and home situation.     1530 Met with Pt and her sister, Rosalba. Both state they need help with caring for their elderly Aunt and are agreeable to help.

## 2025-04-10 NOTE — OP REPORT
DATE OF OPERATION: 4/9/2025     PREOPERATIVE DIAGNOSIS: Right extra-articular distal radius fracture    POSTOPERATIVE DIAGNOSIS: Same    PROCEDURE PERFORMED: Open reduction term fixation right extra-articular distal radius fracture    SURGEON: Eladio Ervin M.D.     ASSISTANT: Memo Ortiz MD     ANESTHESIA: General    SPECIMEN: None    ESTIMATED BLOOD LOSS: 10 mL    IMPLANTS: [place] distal radial locking plate and screws      INDICATIONS: The patient is a 89 y.o. female who presented with a significantly displaced right distal radius fracture.  Closed reduction was unsuccessful in achieving a appropriate reduction.  Recommended open reduction internal fixation.  I discussed the risks and benefits of the procedure which include but are not limited to risks of infection, wound healing complication, neurovascular injury, pain, malunion, non-union, malrotation, and the medical risks of anesthesia including MI, stroke, and death.  Alternatives to surgery were also discussed, including non-operative management, which I did not recommend.  The patient was in agreement with the plan to proceed, and the informed consent was signed and documented.  I met with the patient pre-operatively and marked the operative extremity with their agreement.  We proceeded to the operating room.     DESCRIPTION OF PROCEDURE:  Patient was seen in the preoperative holding area on the day of surgery. The operative site was marked with my initials.  she was taken to the operating room and placed supine on the operative table.  Anesthesia was induced.  The operative extremity was prepped and draped in the normal sterile fashion.  Operative pause was conducted and the correct patient, site, side, procedure, and surgeon's initials on extremity were identified.  A volar FCR approach used approach distal radius.  Underlying musculature was swept aside.  The pronator quadratus was elevated.  This exposed the fracture site.  This remained  significantly displaced including ulnar deviation of the shaft.  Using a freer elevator to help shoehorn this back into position the fracture was able to be reduced back to its anatomic position using cortical reads.  This was held with a K wire.  Distal radial locking plate was fitted to the bone.  This was secured with nonlocking screws on either side of the fracture.  Additional locking screws were then placed in the metaphyseal bone to support the articular surface and metaphysis.  Additional nonlocking and locking screws were placed in the shaft.  She had osteopenic bone which seemed appropriate for age.  The wounds were thoroughly irrigated at this time.  Final fluoroscopic images were obtained that showed that all hardware was extra-articular and not dorsally prominent.  The wounds were closed in layered fashion with Vicryl nylon suture.  Sterile soft dressings were applied.  She was placed into a wrist brace for splinting.    POSTOPERATIVE PLAN: Okay to use the right upper extremity for light activities under 1 or 2 pounds such as ADLs.  Continue to use the wrist brace at all times.  Work on range of motion of the fingers and elbow early and often.  Return to clinic in 2 weeks for wound check and suture removal.      ____________________________________   Eladio Ervin M.D.   DD: 4/10/2025  8:09 AM

## 2025-04-10 NOTE — CARE PLAN
The patient is Stable - Low risk of patient condition declining or worsening    Shift Goals  Clinical Goals: neuro checks, CIWA, pain control, pt safety, encourage oral intake and mobility  Patient Goals: comfort, rest, go home  Family Goals: NA    Progress made toward(s) clinical / shift goals:  patient scoring 0 for CIWA scoring. Tolerating oral intake without nausea. Medicated for pain x1. No acute neuro changes.       Problem: Pain - Standard  Goal: Alleviation of pain or a reduction in pain to the patient’s comfort goal  Outcome: Progressing     Problem: Skin Integrity  Goal: Skin integrity is maintained or improved  Outcome: Progressing     Problem: Fall Risk  Goal: Patient will remain free from falls  Outcome: Progressing       Patient is not progressing towards the following goals:

## 2025-04-10 NOTE — PROGRESS NOTES
Geriatric Medicine Daily Progress Note      Date of Service  4/10/2025    Chief Complaint  89 y.o. female admitted 4/8/2025 with Fall    Hospital Course  89 y.o. female who presented 4/8/2025 with fall resulting in subdural hematoma, right wrist fracture.  Trauma surgery admitted the patient to ICU.  Neurosurgery and orthopedic surgery were consulted on the case.  Patient underwent ORIF of the right distal radius 4/9/2025.  Patient admits to frequent falls, supposed to use a walker as an assistive device but does not do so, admits to alcohol use of 2-4 drinks daily, for the last 2 years.  She denies any withdrawal symptoms in the past.  She denies any cognitive or hearing impairment.  States she has vision impairment but only needs to wear reading glasses.  Patient states she is independent in ADLs and IADLs, continues to drive.     Interval Problem Update  Wrist fracture - pain controlled  SDH - denies headache  HTN - sbp   Alcohol - no tremors or signs of withdrawal  Hyponatremia - 132    Consultants/Specialty  Trauma surgery  Orthopedic surgery     Code Status  Full    Disposition  Postacute placement ?     Review of Systems  Review of Systems   Constitutional:  Negative for chills, diaphoresis, fever and malaise/fatigue.   HENT:  Negative for congestion, hearing loss and sore throat.    Eyes:  Negative for blurred vision.   Respiratory:  Negative for cough, shortness of breath and wheezing.    Cardiovascular:  Negative for chest pain, palpitations and leg swelling.   Gastrointestinal:  Negative for abdominal pain, diarrhea, heartburn, nausea and vomiting.   Genitourinary:  Negative for dysuria, flank pain and hematuria.   Musculoskeletal:  Positive for back pain and joint pain. Negative for myalgias and neck pain.   Skin:  Negative for rash.   Neurological:  Positive for weakness. Negative for dizziness, tremors, sensory change, speech change, focal weakness and headaches.   Psychiatric/Behavioral:  The  patient is not nervous/anxious.         Physical Exam  Temp:  [35.9 °C (96.6 °F)-36.7 °C (98 °F)] 36.2 °C (97.1 °F)  Pulse:  [53-88] 64  Resp:  [12-33] 18  BP: ()/() 113/56  SpO2:  [86 %-99 %] 92 %    Physical Exam  Vitals and nursing note reviewed.   HENT:      Head: Normocephalic.      Comments: Right facial abrasion and ecchymosis     Nose: No congestion.      Mouth/Throat:      Mouth: Mucous membranes are moist.   Eyes:      Conjunctiva/sclera: Conjunctivae normal.   Neck:      Comments: C-collar  Cardiovascular:      Rate and Rhythm: Normal rate and regular rhythm.      Heart sounds: Murmur heard.   Pulmonary:      Effort: Pulmonary effort is normal.      Breath sounds: Normal breath sounds.   Abdominal:      General: There is no distension.      Tenderness: There is no abdominal tenderness. There is no guarding or rebound.   Musculoskeletal:      Right lower leg: Edema present.      Left lower leg: Edema present.      Comments: Right wrist brace   Skin:     Findings: Bruising present.      Comments: Stasis changes both legs   Neurological:      General: No focal deficit present.      Mental Status: She is alert and oriented to person, place, and time.      Cranial Nerves: No cranial nerve deficit.      Motor: No tremor.         CAM  Acute onset and fluctuating course   No   Inattention                                         No   Disorganized thinking                        No   Altered level of consciousness          No     Medication Review    Yes    Laboratory  Recent Labs     04/08/25  2040 04/09/25  0315 04/10/25  0507   WBC 6.5 9.3 5.1   RBC 4.01* 3.99* 3.78*   HEMOGLOBIN 12.0 11.5* 11.1*   HEMATOCRIT 35.3* 35.3* 33.7*   MCV 88.0 88.5 89.2   MCH 29.9 28.8 29.4   MCHC 34.0 32.6 32.9   RDW 47.1 46.2 46.4   PLATELETCT 286 276 242   MPV 9.4 9.9 10.0     Recent Labs     04/08/25 2040 04/09/25 0315 04/10/25  0507   SODIUM 132* 133* 132*   POTASSIUM 3.6 4.0 4.7   CHLORIDE 99 103 104   CO2 20 19*  19*   GLUCOSE 103* 99 120*   BUN 23* 19 22   CREATININE 1.21 1.00 0.90   CALCIUM 8.0* 7.8* 8.6     Recent Labs     04/09/25  0315   INR 1.07               Imaging  DX-THORACIC SPINE-2 VIEWS   Final Result         1.  No acute traumatic bony injury of the thoracic spine.   2.  Atherosclerosis      DX-LUMBAR SPINE-2 OR 3 VIEWS   Final Result         1.  Anterior wedge deformity of T11 and T12, compatible with age indeterminate compression fractures, particularly at T12. Could be further evaluated with CT of the thoracolumbar junction for further characterization.   2.  Mild anterolisthesis L4 on L5.   3.  Atherosclerosis      DX-TIBIA AND FIBULA RIGHT   Final Result         1.  No acute traumatic bony injury.      DX-TIBIA AND FIBULA LEFT   Final Result         1.  No acute traumatic bony injury.      DX-WRIST-COMPLETE 3+ RIGHT   Final Result      Digitized intraoperative radiograph is submitted for review. This examination is not for diagnostic purpose but for guidance during a surgical procedure. Please see the patient's chart for full procedural details.         INTERPRETING LOCATION: [address] , [place], [address]      DX-PORTABLE FLUORO > 1 HOUR   Final Result      Portable fluoroscopy utilized for 9 seconds.         INTERPRETING LOCATION: [address] , [place], [address]      US-TRAUMA VEIN SCREEN LOWER BILAT EXTREMITY   Final Result      CT-MAXILLOFACIAL W/O PLUS RECONS   Final Result         1.  No acute traumatic facial bony injuries identified.   2.  Bilateral sinusitis changes   3.  Atherosclerosis      CT-HEAD W/O   Final Result         1.  2.6 mm right parietal subdural hematoma, stable since prior study.   2.  Calcified mass abutting the anterior right falx, appearance compatible with meningioma.   3.  Bilateral sinusitis changes, greatest in the right maxillary sinus.   4.  Nonspecific white matter changes, commonly associated with small vessel ischemic disease.  Associated mild cerebral atrophy is noted.       DX-WRIST-COMPLETE 3+ RIGHT   Final Result         1.  Distal radial metaphyseal fracture, partially reduced      DX-WRIST-COMPLETE 3+ RIGHT   Final Result         1.  Distal radial metaphyseal fracture      CT-CSPINE WITHOUT PLUS RECONS   Final Result         1.  Multilevel degenerative changes of the cervical spine limit diagnostic sensitivity of this examination, otherwise no acute traumatic bony injury of the cervical spine is apparent.   2.  Atherosclerosis      CT-HEAD W/O   Final Result         1.  2.9 mm right parietal subdural hematoma.   2.  Calcified mass abutting the anterior right falx, appearance compatible with meningioma.   3.  Bilateral maxillary and ethmoid sinusitis changes   4.  Nonspecific white matter changes, commonly associated with small vessel ischemic disease.  Associated mild cerebral atrophy is noted.      Based solely on CT findings, the brain injury guideline category is mBIG 1.      SDH < 4mm   IPH < 4mm   SAH < 3 sulci and < 1mm      The original BIG retrospective analysis found radiographic progression in 0% of BIG 1 patients and 2.6% BIG 2.      These findings were discussed with the patient's clinician, David Sorenson, on 4/8/2025 9:33 PM.           Assessment/Plan  * Trauma- (present on admission)  Assessment & Plan  Trauma surgery-primary service    Frequent falls- (present on admission)  Assessment & Plan  PT and OT    Right wrist fracture, closed, initial encounter- (present on admission)  Assessment & Plan  Open reduction internal fixation right extra-articular distal radius fracture  4/9/2025  Pain control    Alcohol abuse- (present on admission)  Assessment & Plan  CIWA protocol, thiamine, folate, multivitamin    Subdural hematoma, post-traumatic (HCC)- (present on admission)  Assessment & Plan  Neurochecks  Keppra    Compression fracture of thoracic vertebra (HCC)- (present on admission)  Assessment & Plan  Chronic?     Hypomagnesemia- (present on admission)  Assessment &  Plan  IV Mg   Follow level    Stasis dermatitis of both legs- (present on admission)  Assessment & Plan  Wound care    Bilateral lower extremity edema- (present on admission)  Assessment & Plan  May hold Lasix for now    Chronic hyponatremia- (present on admission)  Assessment & Plan  Recommend free water restriction  urine sodium pending    Stage 3a chronic kidney disease- (present on admission)  Assessment & Plan  Follow BMP    Essential hypertension- (present on admission)  Assessment & Plan  Lisinopril    Dyslipidemia- (present on admission)  Assessment & Plan  Was on simvastatin    Tobacco abuse- (present on admission)  Assessment & Plan  Nicotine placement protocol         VTE prophylaxis: SCDs

## 2025-04-10 NOTE — PROGRESS NOTES
SBAR report called to Neuro RN. All questions answered. Patient taken from TICU to CT then going to Neuro. Patient with all personal belongings.

## 2025-04-10 NOTE — ASSESSMENT & PLAN NOTE
Open reduction internal fixation right extra-articular distal radius fracture  4/9/2025  Pain control

## 2025-04-10 NOTE — ASSESSMENT & PLAN NOTE
Patient called needing to reschedule colonoscopy. /[phone].   free water restriction  urine sodium pending  Follow bmp   SCM

## 2025-04-10 NOTE — PROGRESS NOTES
Trauma / Surgical Daily Progress Note    Date of Service  4/9/2025    Chief Complaint  89 y.o. female admitted 4/8/2025 with subdural hematoma and right wrist fracture after a ground level fall.    Interval Events  Tertiary survey complete. Pain to Tspine, Lspine, bilateral LE with palpation. Imaging ordered.  Denies headache.  Geriatric consult complete.  Therapies pending.  Home medication resumed.  High probability of withdrawing from alcohol. Protocol in place.    Review of Systems  Review of Systems   Constitutional:  Negative for chills, fever and malaise/fatigue.   HENT:  Negative for hearing loss and sore throat.    Eyes:  Negative for blurred vision and double vision.   Respiratory:  Negative for cough and shortness of breath.    Cardiovascular:  Negative for chest pain.   Gastrointestinal:  Negative for abdominal pain, nausea and vomiting.   Genitourinary:  Negative for dysuria.   Musculoskeletal:  Positive for back pain, joint pain, myalgias and neck pain.   Neurological:  Negative for tingling, sensory change, weakness and headaches.   Psychiatric/Behavioral:  Positive for substance abuse.    All other systems reviewed and are negative.       Vital Signs  Temp:  [35.8 °C (96.5 °F)-36.7 °C (98 °F)] 36 °C (96.8 °F)  Pulse:  [46-78] 64  Resp:  [12-32] 14  BP: ()/(52-77) 167/73  SpO2:  [86 %-100 %] 99 %    Physical Exam  Physical Exam  Vitals and nursing note reviewed.   Constitutional:       General: She is not in acute distress.     Appearance: She is not ill-appearing.      Interventions: Cervical collar and nasal cannula in place.   HENT:      Head: Atraumatic.      Comments: Facial abrasions/ecchymosis     Right Ear: External ear normal.      Left Ear: External ear normal.      Nose: Nose normal.      Mouth/Throat:      Mouth: Mucous membranes are moist.      Pharynx: Oropharynx is clear.   Eyes:      Extraocular Movements: Extraocular movements intact.      Conjunctiva/sclera: Conjunctivae  Unknown if ever smoked normal.      Pupils: Pupils are equal, round, and reactive to light.   Cardiovascular:      Rate and Rhythm: Normal rate and regular rhythm.      Pulses: Normal pulses.      Heart sounds: Normal heart sounds.   Pulmonary:      Effort: Pulmonary effort is normal. No respiratory distress.   Chest:      Chest wall: No tenderness.   Abdominal:      General: There is no distension.      Palpations: Abdomen is soft.      Tenderness: There is no abdominal tenderness. There is no guarding.   Musculoskeletal:         General: Swelling (right hand) and tenderness present.      Cervical back: Normal range of motion. Tenderness present.      Right lower leg: Edema present.      Left lower leg: Edema present.      Comments: Tenderness to thoracic and lumbar spine  BLE tenderness to palpation  Right wrist splint   Skin:     General: Skin is warm and dry.      Capillary Refill: Capillary refill takes less than 2 seconds.      Findings: Bruising present.      Comments: Abrasions  BLE stasis   Neurological:      Mental Status: She is alert.      GCS: GCS eye subscore is 4. GCS verbal subscore is 5. GCS motor subscore is 6.      Sensory: Sensation is intact.      Motor: Weakness (2/2 injury) present.   Psychiatric:         Mood and Affect: Mood normal.         Behavior: Behavior normal.         Judgment: Judgment normal.         Laboratory  Recent Results (from the past 24 hours)   CBC WITH DIFFERENTIAL    Collection Time: 04/08/25  8:40 PM   Result Value Ref Range    WBC 6.5 4.8 - 10.8 K/uL    RBC 4.01 (L) 4.20 - 5.40 M/uL    Hemoglobin 12.0 12.0 - 16.0 g/dL    Hematocrit 35.3 (L) 37.0 - 47.0 %    MCV 88.0 81.4 - 97.8 fL    MCH 29.9 27.0 - 33.0 pg    MCHC 34.0 32.2 - 35.5 g/dL    RDW 47.1 35.9 - 50.0 fL    Platelet Count 286 164 - 446 K/uL    MPV 9.4 9.0 - 12.9 fL    Neutrophils-Polys 46.90 44.00 - 72.00 %    Lymphocytes 37.70 22.00 - 41.00 %    Monocytes 8.60 0.00 - 13.40 %    Eosinophils 5.20 0.00 - 6.90 %    Basophils 1.10 0.00 -  1.80 %    Immature Granulocytes 0.50 0.00 - 0.90 %    Nucleated RBC 0.00 0.00 - 0.20 /100 WBC    Neutrophils (Absolute) 3.04 1.82 - 7.42 K/uL    Lymphs (Absolute) 2.44 1.00 - 4.80 K/uL    Monos (Absolute) 0.56 0.00 - 0.85 K/uL    Eos (Absolute) 0.34 0.00 - 0.51 K/uL    Baso (Absolute) 0.07 0.00 - 0.12 K/uL    Immature Granulocytes (abs) 0.03 0.00 - 0.11 K/uL    NRBC (Absolute) 0.00 K/uL   COMP METABOLIC PANEL    Collection Time: 04/08/25  8:40 PM   Result Value Ref Range    Sodium 132 (L) 135 - 145 mmol/L    Potassium 3.6 3.6 - 5.5 mmol/L    Chloride 99 96 - 112 mmol/L    Co2 20 20 - 33 mmol/L    Anion Gap 13.0 7.0 - 16.0    Glucose 103 (H) 65 - 99 mg/dL    Bun 23 (H) 8 - 22 mg/dL    Creatinine 1.21 0.50 - 1.40 mg/dL    Calcium 8.0 (L) 8.5 - 10.5 mg/dL    Correct Calcium 8.4 (L) 8.5 - 10.5 mg/dL    AST(SGOT) 28 12 - 45 U/L    ALT(SGPT) 15 2 - 50 U/L    Alkaline Phosphatase 79 30 - 99 U/L    Total Bilirubin 0.4 0.1 - 1.5 mg/dL    Albumin 3.5 3.2 - 4.9 g/dL    Total Protein 5.5 (L) 6.0 - 8.2 g/dL    Globulin 2.0 1.9 - 3.5 g/dL    A-G Ratio 1.8 g/dL   DIAGNOSTIC ALCOHOL    Collection Time: 04/08/25  8:40 PM   Result Value Ref Range    Diagnostic Alcohol 208.0 (H) <10.1 mg/dL   MAGNESIUM    Collection Time: 04/08/25  8:40 PM   Result Value Ref Range    Magnesium 2.0 1.5 - 2.5 mg/dL   TSH WITH REFLEX TO FT4    Collection Time: 04/08/25  8:40 PM   Result Value Ref Range    TSH 4.940 0.380 - 5.330 uIU/mL   ESTIMATED GFR    Collection Time: 04/08/25  8:40 PM   Result Value Ref Range    GFR (CKD-EPI) 43 (A) >60 mL/min/1.73 m 2   PLATELET MAPPING WITH BASIC TEG    Collection Time: 04/08/25 10:13 PM   Result Value Ref Range    Reaction Time Initial-R 4.4 (L) 4.6 - 9.1 min    React Time Initial Hep 4.1 (L) 4.3 - 8.3 min    Clot Kinetics-K 1.0 0.8 - 2.1 min    Clot Angle-Angle 75.3 63.0 - 78.0 degrees    Maximum Clot Strength-MA 62.0 52.0 - 69.0 mm    TEG Functional Fibrinogen(MA) 18.4 15.0 - 32.0 mm    % Inhibition ADP 60.9 (H)  0.0 - 17.0 %    % Inhibition AA 21.2 (H) 0.0 - 11.0 %    TEG Algorithm Link Algorithm    EKG    Collection Time: 25 10:21 PM   Result Value Ref Range    Report       [facility]    Test Date:  2025  Pt Name:    ZULEIMA COLE                  Department: ER  MRN:        [id number]                      Room:       [facility]  Gender:     Female                       Technician: [id number]  :        [id number]                   Requested By:PALLAVI HAFTIELD  Order #:    [id number]                    Reading MD: Pallavi Hatfield    Measurements  Intervals                                Axis  Rate:       47                           P:          59  CT:         175                          QRS:        69  QRSD:       104                          T:          65  QT:         548  QTc:        485    Interpretive Statements  Sinus bradycardia  Low voltage, precordial leads  Compared to ECG 2024 23:11:06  Low QRS voltage now present  Sinus rhythm no longer present  Prolonged QT interval no longer present  Electronically Signed On 2025 22:21:16 PDT by Pallavi Hatfield     POCT glucose device results    Collection Time: 25  1:12 AM   Result Value Ref Range    POC Glucose, Blood 102 (H) 65 - 99 mg/dL   CBC with Differential: Tomorrow AM    Collection Time: 25  3:15 AM   Result Value Ref Range    WBC 9.3 4.8 - 10.8 K/uL    RBC 3.99 (L) 4.20 - 5.40 M/uL    Hemoglobin 11.5 (L) 12.0 - 16.0 g/dL    Hematocrit 35.3 (L) 37.0 - 47.0 %    MCV 88.5 81.4 - 97.8 fL    MCH 28.8 27.0 - 33.0 pg    MCHC 32.6 32.2 - 35.5 g/dL    RDW 46.2 35.9 - 50.0 fL    Platelet Count 276 164 - 446 K/uL    MPV 9.9 9.0 - 12.9 fL    Neutrophils-Polys 82.20 (H) 44.00 - 72.00 %    Lymphocytes 11.50 (L) 22.00 - 41.00 %    Monocytes 5.20 0.00 - 13.40 %    Eosinophils 0.30 0.00 - 6.90 %    Basophils 0.30 0.00 - 1.80 %    Immature Granulocytes 0.50 0.00 - 0.90 %    Nucleated RBC 0.00 0.00 - 0.20 /100 WBC    Neutrophils  (Absolute) 7.62 (H) 1.82 - 7.42 K/uL    Lymphs (Absolute) 1.07 1.00 - 4.80 K/uL    Monos (Absolute) 0.48 0.00 - 0.85 K/uL    Eos (Absolute) 0.03 0.00 - 0.51 K/uL    Baso (Absolute) 0.03 0.00 - 0.12 K/uL    Immature Granulocytes (abs) 0.05 0.00 - 0.11 K/uL    NRBC (Absolute) 0.00 K/uL   Comp Metabolic Panel (CMP): Tomorrow AM    Collection Time: 04/09/25  3:15 AM   Result Value Ref Range    Sodium 133 (L) 135 - 145 mmol/L    Potassium 4.0 3.6 - 5.5 mmol/L    Chloride 103 96 - 112 mmol/L    Co2 19 (L) 20 - 33 mmol/L    Anion Gap 11.0 7.0 - 16.0    Glucose 99 65 - 99 mg/dL    Bun 19 8 - 22 mg/dL    Creatinine 1.00 0.50 - 1.40 mg/dL    Calcium 7.8 (L) 8.5 - 10.5 mg/dL    Correct Calcium 8.4 (L) 8.5 - 10.5 mg/dL    AST(SGOT) 33 12 - 45 U/L    ALT(SGPT) 14 2 - 50 U/L    Alkaline Phosphatase 83 30 - 99 U/L    Total Bilirubin <0.2 0.1 - 1.5 mg/dL    Albumin 3.3 3.2 - 4.9 g/dL    Total Protein 5.5 (L) 6.0 - 8.2 g/dL    Globulin 2.2 1.9 - 3.5 g/dL    A-G Ratio 1.5 g/dL   Heparin Anti-Xa    Collection Time: 04/09/25  3:15 AM   Result Value Ref Range    Heparin Xa (UFH) <0.10 IU/mL   ESTIMATED GFR    Collection Time: 04/09/25  3:15 AM   Result Value Ref Range    GFR (CKD-EPI) 54 (A) >60 mL/min/1.73 m 2   Prothrombin Time    Collection Time: 04/09/25  3:15 AM   Result Value Ref Range    PT 13.9 12.0 - 14.6 sec    INR 1.07 0.87 - 1.13       Fluids    Intake/Output Summary (Last 24 hours) at 4/9/2025 0426  Last data filed at 4/9/2025 1407  Gross per 24 hour   Intake 1416.61 ml   Output 210 ml   Net 1206.61 ml       Core Measures & Quality Metrics  Labs reviewed, Medications reviewed and Radiology images reviewed  Deutsch catheter: No Deutsch      DVT Prophylaxis: Contraindicated - High bleeding risk  DVT prophylaxis - mechanical: SCDs  Ulcer prophylaxis: Not indicated    Assessed for rehab: Patient was assess for and/or received rehabilitation services during this hospitalization    RAP Score Total: 7    CAGE Results: positive Blood  Alcohol>0.08: yes CAGE Score: 0  Total: POSITIVE  Assessment complete date: 4/9/2025  Intervention: Complete. Patient response to intervention:.   Patient demonstrates understanding of intervention. Patient agrees to follow-up.   has been contacted. Follow up with: PCP  Total ETOH intervention time: 15 - 30 mintues      Assessment/Plan  * Trauma- (present on admission)  Assessment & Plan  Ground level fall.  T-5000 Activation.  Noam Evangelista MD. Trauma Surgery.    Pain of neck with recent traumatic injury- (present on admission)  Assessment & Plan  Complaints of neck pain.   Unable to clear C-spine due to intoxication.  CT C-spine inconclusive due to chronic degenerative changes.  Continue C-collar until sober and rexamine    Right wrist fracture, closed, initial encounter- (present on admission)  Assessment & Plan   Distal right metaphyseal fracture.  Non-operative management.  Closed reduction and splinted in ED.  Weight bearing status - Nonweightbearing RUE.  Eladio Ervin MD. Orthopedic Surgeon. [facility].    Subdural hematoma, post-traumatic (HCC)- (present on admission)  Assessment & Plan   2.9mm right parietal subdural hematoma.  mBIG 2.  Interval head CT stable. Consider Lovenox in 24 hours  Non-operative management.  Post traumatic pharmacologic seizure prophylaxis for 1 week.  Speech Language Pathology cognitive evaluation.  Neurosurgery consultation not indicated.    Abrasion, face without infection- (present on admission)  Assessment & Plan  CT max-face without fracture.  Topical care.    Chronic hyponatremia- (present on admission)  Assessment & Plan  Serum Na 132, baseline 127-134 in setting of alcoholism.  Gentle IVF.   Trend.    Alcohol abuse- (present on admission)  Assessment & Plan  Admission blood alcohol level of 208mg/dL.  Trauma alcohol withdrawal protocol initiated.  PO vitamins.  SBIRT completed. Patient drinks daily. Previous admissions for GLF secondary to  intoxication    Contraindication to deep vein thrombosis (DVT) prophylaxis- (present on admission)  Assessment & Plan  VTE prophylaxis initially contraindicated secondary to elevated bleeding risk.  4/8 Trauma surveillance venous duplex ultrasonography ordered.    Stage 3a chronic kidney disease- (present on admission)  Assessment & Plan  Cr 1.21 from baseline 0.9.   GFR 43.  Avoid nephrotoxins.   Renally dose medications.  Trend renal indices.   Gentle hydration.    Encounter for geriatric assessment- (present on admission)  Assessment & Plan  4/9 The patient is 75 years old or older and a geriatrics consult is indicated  Santi Rawls MD, Geriatric Hospitalist.    Dyslipidemia- (present on admission)  Assessment & Plan  Chronic condition treated with simvastatin.  4/9 Resumed maintenance medication.    Tobacco abuse- (present on admission)  Assessment & Plan  Nicotine replacement.  Cessation education.    Essential hypertension- (present on admission)  Assessment & Plan  Chronic condition treated with lasix and lisinopril.  4/9 Resumed maintenance medication.  Monitor serum creatinine and serum sodium trend prior to initiating.        Discussed patient condition with RN, Patient, and trauma surgery, Dr Treviño.

## 2025-04-10 NOTE — PROGRESS NOTES
Patient transferred into a better fitting [facility] XS cervical collar with orthopedic technician x2 assistance.    Contact traction with any questions or concerns regarding the use of this DME.

## 2025-04-10 NOTE — PROGRESS NOTES
Trauma / Surgical Daily Progress Note    Date of Service  4/10/2025    Chief Complaint  89 y.o. female admitted 4/8/2025 with subdural hematoma and right wrist fracture after a ground level fall.     Interval Events  Xray Tspine with anterior wedge deformity of T11-T12. Mild pain on assessment. Plan for CT Tspine.  Continues to have neck pain. Plan for MRI Cspine.  Tolerating diet.  Mild hallucinations, no obvious tremors noted.  Lovenox: to start tonight.    - Stable for transfer to Neuro [facility]onz[facility]  - Continue to monitor for alcohol withdrawal.  - Regulate sleep/wake cycle. Lights on during the day.  - Mobilize. In chair for all meals.  - Free water fluid restriction. Okay for juice, coffee, Gatorade.    Review of Systems  Review of Systems   Constitutional:  Negative for chills and fever.   Gastrointestinal:  Negative for abdominal pain, nausea and vomiting.   Genitourinary:  Negative for dysuria.   Musculoskeletal:  Positive for back pain, joint pain, myalgias and neck pain.   Neurological:  Negative for tingling, sensory change, weakness and headaches.   Psychiatric/Behavioral:  Positive for hallucinations and substance abuse.    All other systems reviewed and are negative.       Vital Signs  Temp:  [35.9 °C (96.6 °F)-36.7 °C (98 °F)] 36.2 °C (97.1 °F)  Pulse:  [53-88] 60  Resp:  [12-33] 22  BP: ()/() 112/56  SpO2:  [89 %-97 %] 89 %    Physical Exam  Physical Exam  Vitals and nursing note reviewed.   Constitutional:       General: She is not in acute distress.     Appearance: She is not ill-appearing.      Interventions: Cervical collar and nasal cannula in place.   HENT:      Head:      Comments: Facial abrasions/ecchymosis     Mouth/Throat:      Mouth: Mucous membranes are moist.      Pharynx: Oropharynx is clear.   Eyes:      Pupils: Pupils are equal, round, and reactive to light.   Cardiovascular:      Rate and Rhythm: Normal rate and regular rhythm.      Pulses: Normal pulses.      Heart sounds:  Normal heart sounds.   Pulmonary:      Effort: Pulmonary effort is normal. No respiratory distress.   Chest:      Chest wall: No tenderness.   Abdominal:      General: There is no distension.      Palpations: Abdomen is soft.   Musculoskeletal:         General: Swelling (right hand) and tenderness present.      Cervical back: Tenderness present.      Right lower leg: Edema (mild) present.      Left lower leg: Edema (mild) present.      Comments: Mild tenderness to thoracic and lumbar spine  Right wrist splint   Skin:     General: Skin is warm and dry.      Capillary Refill: Capillary refill takes less than 2 seconds.      Findings: Bruising present.      Comments: Abrasions  BLE stasis   Neurological:      Mental Status: She is alert.      GCS: GCS eye subscore is 4. GCS verbal subscore is 5. GCS motor subscore is 6.      Sensory: Sensation is intact.      Motor: Weakness (2/2 injury) present.   Psychiatric:         Mood and Affect: Mood normal.         Behavior: Behavior normal.         Judgment: Judgment normal.         Laboratory  Recent Results (from the past 24 hours)   POCT glucose device results    Collection Time: 04/09/25  6:55 PM   Result Value Ref Range    POC Glucose, Blood 180 (H) 65 - 99 mg/dL   POCT glucose device results    Collection Time: 04/09/25  8:04 PM   Result Value Ref Range    POC Glucose, Blood 198 (H) 65 - 99 mg/dL   POCT glucose device results    Collection Time: 04/10/25  1:04 AM   Result Value Ref Range    POC Glucose, Blood 141 (H) 65 - 99 mg/dL   CBC with Differential: Tomorrow AM    Collection Time: 04/10/25  5:07 AM   Result Value Ref Range    WBC 5.1 4.8 - 10.8 K/uL    RBC 3.78 (L) 4.20 - 5.40 M/uL    Hemoglobin 11.1 (L) 12.0 - 16.0 g/dL    Hematocrit 33.7 (L) 37.0 - 47.0 %    MCV 89.2 81.4 - 97.8 fL    MCH 29.4 27.0 - 33.0 pg    MCHC 32.9 32.2 - 35.5 g/dL    RDW 46.4 35.9 - 50.0 fL    Platelet Count 242 164 - 446 K/uL    MPV 10.0 9.0 - 12.9 fL    Neutrophils-Polys 81.00 (H) 44.00  - 72.00 %    Lymphocytes 12.90 (L) 22.00 - 41.00 %    Monocytes 5.50 0.00 - 13.40 %    Eosinophils 0.00 0.00 - 6.90 %    Basophils 0.20 0.00 - 1.80 %    Immature Granulocytes 0.40 0.00 - 0.90 %    Nucleated RBC 0.00 0.00 - 0.20 /100 WBC    Neutrophils (Absolute) 4.09 1.82 - 7.42 K/uL    Lymphs (Absolute) 0.65 (L) 1.00 - 4.80 K/uL    Monos (Absolute) 0.28 0.00 - 0.85 K/uL    Eos (Absolute) 0.00 0.00 - 0.51 K/uL    Baso (Absolute) 0.01 0.00 - 0.12 K/uL    Immature Granulocytes (abs) 0.02 0.00 - 0.11 K/uL    NRBC (Absolute) 0.00 K/uL   Comp Metabolic Panel (CMP): Tomorrow AM    Collection Time: 04/10/25  5:07 AM   Result Value Ref Range    Sodium 132 (L) 135 - 145 mmol/L    Potassium 4.7 3.6 - 5.5 mmol/L    Chloride 104 96 - 112 mmol/L    Co2 19 (L) 20 - 33 mmol/L    Anion Gap 9.0 7.0 - 16.0    Glucose 120 (H) 65 - 99 mg/dL    Bun 22 8 - 22 mg/dL    Creatinine 0.90 0.50 - 1.40 mg/dL    Calcium 8.6 8.5 - 10.5 mg/dL    Correct Calcium 9.2 8.5 - 10.5 mg/dL    AST(SGOT) 27 12 - 45 U/L    ALT(SGPT) 11 2 - 50 U/L    Alkaline Phosphatase 77 30 - 99 U/L    Total Bilirubin 0.3 0.1 - 1.5 mg/dL    Albumin 3.2 3.2 - 4.9 g/dL    Total Protein 5.3 (L) 6.0 - 8.2 g/dL    Globulin 2.1 1.9 - 3.5 g/dL    A-G Ratio 1.5 g/dL   Magnesium: Every Monday and Thursday AM    Collection Time: 04/10/25  5:07 AM   Result Value Ref Range    Magnesium 1.8 1.5 - 2.5 mg/dL   Phosphorus: Every Monday and Thursday AM    Collection Time: 04/10/25  5:07 AM   Result Value Ref Range    Phosphorus 4.0 2.5 - 4.5 mg/dL   PTH INTACT (PTH ONLY)    Collection Time: 04/10/25  5:07 AM   Result Value Ref Range    Pth, Intact 71.8 14.0 - 72.0 pg/mL   VITAMIN B12    Collection Time: 04/10/25  5:07 AM   Result Value Ref Range    Vitamin B12 -True Cobalamin 561 211 - 911 pg/mL   VITAMIN D,25 HYDROXY (DEFICIENCY)    Collection Time: 04/10/25  5:07 AM   Result Value Ref Range    25-Hydroxy   Vitamin D 25 46 30 - 100 ng/mL   ESTIMATED GFR    Collection Time: 04/10/25  5:07  AM   Result Value Ref Range    GFR (CKD-EPI) 61 >60 mL/min/1.73 m 2   POCT glucose device results    Collection Time: 04/10/25  6:28 AM   Result Value Ref Range    POC Glucose, Blood 114 (H) 65 - 99 mg/dL   POCT glucose device results    Collection Time: 04/10/25 11:10 AM   Result Value Ref Range    POC Glucose, Blood 175 (H) 65 - 99 mg/dL       Fluids    Intake/Output Summary (Last 24 hours) at 4/10/2025 1518  Last data filed at 4/10/2025 1500  Gross per 24 hour   Intake 879.62 ml   Output 200 ml   Net 679.62 ml       Core Measures & Quality Metrics  Labs reviewed, Medications reviewed and Radiology images reviewed  Deutsch catheter: No Deutsch      DVT Prophylaxis: Enoxaparin (Lovenox)  DVT prophylaxis - mechanical: SCDs  Ulcer prophylaxis: Not indicated    Assessed for rehab: Patient was assess for and/or received rehabilitation services during this hospitalization    RAP Score Total: 7    CAGE Results: positive Blood Alcohol>0.08: yes CAGE Score: 0  Total: POSITIVE  Assessment complete date: 4/9/2025  Intervention: Complete. Patient response to intervention:.   Patient demonstrates understanding of intervention. Patient agrees to follow-up.   has been contacted. Follow up with: PCP  Total ETOH intervention time: 15 - 30 mintues      Assessment/Plan  * Trauma- (present on admission)  Assessment & Plan  Ground level fall.  T-5000 Activation.  Noam Evangelista MD. Trauma Surgery.    Pain of neck with recent traumatic injury- (present on admission)  Assessment & Plan  Complaints of neck pain.   Unable to clear C-spine due to intoxication.  CT C-spine inconclusive due to chronic degenerative changes.  Continue C-collar until sober and rexamine  4/10 Continues with neck pain. MRI ordered    Right wrist fracture, closed, initial encounter- (present on admission)  Assessment & Plan  Distal right metaphyseal fracture.  Closed reduction and splinted in ED.  4/10 Open reduction term fixation right  extra-articular distal radius fracture.  Weight bearing status - Okay to use the RUE for light activities under 1 or 2 pounds such as ADLs. Continue to use the wrist brace at all times. Work on range of motion of the fingers and elbow early and often.   Eladio Ervin MD. Orthopedic Surgeon. [facility].  Follow up in 2 weeks.    Subdural hematoma, post-traumatic (HCC)- (present on admission)  Assessment & Plan  2.9mm right parietal subdural hematoma.  mBIG 2.  Interval head CT stable. Consider Lovenox in 24 hours  Non-operative management.  Post traumatic pharmacologic seizure prophylaxis for 1 week.  Speech Language Pathology cognitive evaluation. No further recommendations on discharge  Neurosurgery consultation not indicated.    Thoracic and lumbar back pain- (present on admission)  Assessment & Plan  4/9 Pain on palpation to thoracic and lumbar.   - Xray with anterior wedge deformity of T11 and T12, compatible with age indeterminate compression fractures, particularly at T12.   4/10 CT TSpine ordered.    Abrasion, face without infection- (present on admission)  Assessment & Plan  CT max-face without fracture.  Topical care.    Chronic hyponatremia- (present on admission)  Assessment & Plan  Serum Na 132, baseline 127-134 in setting of alcoholism.  Gentle IVF.   Trend.  4/10 Sodium 132. Urine sodium pending per Geriatrics    Alcohol abuse- (present on admission)  Assessment & Plan  Admission blood alcohol level of 208mg/dL.  Trauma alcohol withdrawal protocol initiated.  PO vitamins.  SBIRT completed. Patient drinks daily. Previous admissions for GLF secondary to intoxication    No contraindication to deep vein thrombosis (DVT) prophylaxis- (present on admission)  Assessment & Plan  VTE prophylaxis initially contraindicated secondary to elevated bleeding risk.  4/9 Trauma screening bilateral lower extremity venous duplex negative for above knee DVT.  4/10 Prophylactic dose enoxaparin 30 mg BID  initiated.   VTE Prophylaxis approved by Trauma Surgery.    Stage 3a chronic kidney disease- (present on admission)  Assessment & Plan  Cr 1.21 from baseline 0.9.   GFR 43.  Avoid nephrotoxins.   Renally dose medications.  Trend renal indices.   Gentle hydration.    Electrolyte abnormality- (present on admission)  Assessment & Plan  4/10 Magnesium 1.8  - Replete and monitor    Tib/Fib pain, bilateral- (present on admission)  Assessment & Plan  4/9 Pain with palpation to bilateral Tib/fib  - imaging with no acute fracture.    Encounter for geriatric assessment- (present on admission)  Assessment & Plan  4/9 The patient is 75 years old or older and a geriatrics consult is indicated  Santi Rawls MD, Geriatric Hospitalist.    Dyslipidemia- (present on admission)  Assessment & Plan  Chronic condition treated with simvastatin.  4/9 Resumed maintenance medication.    Tobacco abuse- (present on admission)  Assessment & Plan  Nicotine replacement.  Cessation education.    Essential hypertension- (present on admission)  Assessment & Plan  Chronic condition treated with lasix and lisinopril.  4/9 Resumed lisinopril.  Monitor serum creatinine and serum sodium trend prior to initiating.        Discussed patient condition with RN, Patient, and trauma surgery, Dr. Treviño.

## 2025-04-10 NOTE — ASSESSMENT & PLAN NOTE
4/9 Pain on palpation to thoracic and lumbar.   - Xray with anterior wedge deformity of T11 and T12, compatible with age indeterminate compression fractures, particularly at T12.   4/10 CT TSpine with age-indeterminate but chronic appearing mild T12 compression and Schmorl's node deformity.   Multimodal pain regimen

## 2025-04-10 NOTE — THERAPY
"Occupational Therapy   Initial Evaluation     Patient Name: Lily Lloyd  Age:  89 y.o., Sex:  female  Medical Record #: [id number]  Today's Date: 4/10/2025     Precautions  Precautions: Fall Risk, Spinal / Back Precautions , Cervical Collar  , Non Weight Bearing Right Upper Extremity  Comments: \"Okay to use the right upper extremity for light activities under 1 or 2 pounds such as ADLs.  Continue to use the wrist brace at all times.  Work on range of motion of the fingers and elbow early and often.\" C-collar in place at time of eval, T11-12 anterior wedge deformity    Assessment    Patient is 89 y.o. female admitted after GLF while intoxicated. Pt found to have anterior wedge deformity at T11-T12 (possibly chronic), R wrist fracture, and SDH. Other pertinent medical history includes frequent falls, bilateral LE edema, stage 3a chronic kidney disease, HTN, meningioma, alcohol use, and DLD. Pt seen for OT evaluation and treatment. Pt required CGA-mod A for functional mobility, donning/doffing socks, and toilet hygiene. Pt lives with her sister and aunt who she cares for (see grid for details). Of note, pt reported hallucinations and impaired memory at time of eval. RN and nurse practitioner aware. Pt educated regarding precautions in relation to ADLs, importance of normalized routines/sleep wake cycles while in ICU, the role of OT, and the pathology of bedrest. Pt current functional performance limited by impaired activity tolerance, impaired balance, and impaired cognition. Pt will benefit from skilled OT while admitted to acute care.     Plan    Occupational Therapy Initial Treatment Plan   Treatment Interventions: Self Care / Activities of Daily Living, Adaptive Equipment, Neuro Re-Education / Balance, Therapeutic Exercises, Therapeutic Activity  Treatment Frequency: 4 Times per Week  Duration: Until Therapy Goals Met    DC Equipment Recommendations: Unable to determine at this time  Discharge Recommendations: " "Recommend post-acute placement for additional occupational therapy services prior to discharge home     Subjective    \"Yeah, I have been hallucinating....\" RN and Nurse practioner aware     Objective     04/10/25 1507   Prior Living Situation   Prior Services None   Housing / Facility 2 Story House   Steps Into Home   (1)   Steps In Home   (flight of stairs, has chair lift)   Bathroom Set up Bathtub / Shower Combination;Walk In Shower;Shower Chair   Equipment Owned Grab Bar(s) By Toilet;Raised Toilet Seat With Arms;Tub / Shower Seat   Lives with - Patient's Self Care Capacity Related Adult   Comments 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 Pt does all IADLs for the household and assists her aunt with dressing, toileting, and assists her up if she falls.   Prior Level of ADL Function   Self Feeding Independent   Grooming / Hygiene Independent   Bathing Independent   Dressing Independent   Toileting Independent   Prior Level of IADL Function   Medication Management Independent   Laundry Independent   Kitchen Mobility Independent   Finances Independent   Home Management Independent   Shopping Independent   Prior Level Of Mobility Independent Without Device in Community;Independent Without Device in Home   Driving / Transportation Driving Independent   History of Falls   History of Falls Yes   Date of Last Fall   (Pt reported ~9 falls related to alcohol in the past year.)   Precautions   Precautions Fall Risk;Spinal / Back Precautions ;Cervical Collar  ;Non Weight Bearing Right Upper Extremity   Comments \"Okay to use the right upper extremity for light activities under 1 or 2 pounds such as ADLs.  Continue to use the wrist brace at all times.  Work on range of motion of the fingers and elbow early and often.\"   Vitals   Vitals Comments remained stable throughout   Pain   Pain Scales 0 to 10 Scale    Pain 0 - 10 Group   Therapist Pain Assessment Post Activity Pain Same as Prior to " Activity;Nurse Notified  (not rated, agreeable to session)   Non Verbal Descriptors   Non Verbal Scale  Calm   Cognition    Cognition / Consciousness X   Orientation Level Oriented x 4   Level of Consciousness Alert   Ability To Follow Commands 2 Step   Safety Awareness Impaired   New Learning Impaired   Attention Impaired   Comments Shortly into session, pt reported hallucinations of people who were not in the room. RN and nurse practitioner notified. Pt also reported feeling that her memory has been worse since she has been admitted to the hospital.   Passive ROM Upper Body   Comments B UE WDL outside of brace   Active ROM Upper Body   Dominant Hand Right   Comments B UE WDL outside of brace   Strength Upper Body   Comments L UE WDL, R UE NT due to NWB status   Sensation Upper Body   Upper Extremity Sensation  WDL   Upper Body Muscle Tone   Upper Body Muscle Tone  WDL   Coordination Upper Body   Comments WDL at time of eval   Balance Assessment   Sitting Balance (Static) Fair   Sitting Balance (Dynamic) Fair -   Standing Balance (Static) Fair -   Standing Balance (Dynamic) Fair -   Weight Shift Sitting Fair   Weight Shift Standing Fair   Comments HHA   Bed Mobility    Supine to Sit Standby Assist   Sit to Supine Standby Assist   Scooting Standby Assist   Rolling Standby Assist   Comments cues for log roll   ADL Assessment   Lower Body Dressing Moderate Assist  (don/doff socks)   Toileting Minimal Assist  (urine on toilet)   Functional Mobility   Sit to Stand Contact Guard Assist   Bed, Chair, Wheelchair Transfer Contact Guard Assist   Toilet Transfers Contact Guard Assist   Transfer Method Stand Step   Mobility EOB>bathroom>bed   Comments w/ HHA   ICU Target Mobility Level   ICU Mobility - Targeted Level Level 4   Visual Perception   Comments declined visual changes, diplopia, photosensitivity, visual tracking and peripheral vision WFL   Activity Tolerance   Sitting in Chair NT   Sitting Edge of Bed >5 min    Standing >5 min   Comments functional for eval   Patient / Family Goals   Patient / Family Goal #1 to go home   Short Term Goals   Short Term Goal # 1 Pt will perform LB dressing w/ supv and AE PRN   Short Term Goal # 2 Pt will perform ADL transfer w/ supv   Short Term Goal # 3 Pt will perform standing g/h w/ supv   Short Term Goal # 4 Pt will perform toilet hygiene w/ supv   Education Group   Education Provided Role of Occupational Therapist;Activities of Daily Living;Spinal Precautions;Weight Bearing Precautions   Role of Occupational Therapist Patient Response Patient;Acceptance;Explanation;Verbal Demonstration   Spinal Precautions Patient Response Patient;Acceptance;Explanation;Demonstration;Handout;Reinforcement Needed   ADL Patient Response Patient;Acceptance;Explanation;Demonstration;Verbal Demonstration;Action Demonstration;Reinforcement Needed   Weight Bearing Precautions Patient Response Patient;Acceptance;Explanation;Demonstration;Verbal Demonstration;Action Demonstration;Reinforcement Needed   Occupational Therapy Initial Treatment Plan    Treatment Interventions Self Care / Activities of Daily Living;Adaptive Equipment;Neuro Re-Education / Balance;Therapeutic Exercises;Therapeutic Activity   Treatment Frequency 4 Times per Week   Duration Until Therapy Goals Met   Problem List   Problem List Decreased Active Daily Living Skills;Decreased Homemaking Skills;Decreased Activity Tolerance;Decreased Functional Mobility;Safety Awareness Deficits / Cognition;Impaired Cognitive Function;Impaired Postural Control / Balance;Limited Knowledge of Post Op Precautions

## 2025-04-10 NOTE — CARE PLAN
The patient is Stable - Low risk of patient condition declining or worsening    Shift Goals  Clinical Goals: neuro checks; CIWA protocol; safety  Patient Goals: comfort; rest  Family Goals: ANÍBAL    Progress made toward(s) clinical / shift goals:    Problem: Pain - Standard  Goal: Alleviation of pain or a reduction in pain to the patient’s comfort goal  Outcome: Progressing  Note: With minimal complaints of pain to R arm and R face. PRN pain med available.      Problem: Optimal Care for Alcohol Withdrawal  Goal: Optimal Care for the alcohol withdrawal patient  Outcome: Progressing  Note: CIWA scores of 0 throughout the night.

## 2025-04-10 NOTE — PROGRESS NOTES
Bedside SBAR report received from night shift RN. Patient is awake and alert with call light within reach and bed in lowest locked position. Patient appears in no acute distress at this time. fall precautions in place. Patient denies any needs at this time. Patient with [place] J collar in place, currently on RA. Aox4, brace to RUE. Patient with tele monitor and  in place.

## 2025-04-11 ENCOUNTER — PHARMACY VISIT (OUTPATIENT)
Dept: PHARMACY | Facility: MEDICAL CENTER | Age: 89
End: 2025-04-11
Payer: COMMERCIAL

## 2025-04-11 VITALS
WEIGHT: 147.05 LBS | TEMPERATURE: 97.3 F | DIASTOLIC BLOOD PRESSURE: 53 MMHG | OXYGEN SATURATION: 94 % | SYSTOLIC BLOOD PRESSURE: 105 MMHG | RESPIRATION RATE: 16 BRPM | HEIGHT: 62 IN | HEART RATE: 59 BPM | BODY MASS INDEX: 27.06 KG/M2

## 2025-04-11 LAB
ANION GAP SERPL CALC-SCNC: 6 MMOL/L (ref 7–16)
BUN SERPL-MCNC: 26 MG/DL (ref 8–22)
CALCIUM SERPL-MCNC: 8.4 MG/DL (ref 8.5–10.5)
CHLORIDE SERPL-SCNC: 105 MMOL/L (ref 96–112)
CO2 SERPL-SCNC: 22 MMOL/L (ref 20–33)
CREAT SERPL-MCNC: 1.11 MG/DL (ref 0.5–1.4)
ERYTHROCYTE [DISTWIDTH] IN BLOOD BY AUTOMATED COUNT: 47.7 FL (ref 35.9–50)
GFR SERPLBLD CREATININE-BSD FMLA CKD-EPI: 48 ML/MIN/1.73 M 2
GLUCOSE BLD STRIP.AUTO-MCNC: 118 MG/DL (ref 65–99)
GLUCOSE BLD STRIP.AUTO-MCNC: 65 MG/DL (ref 65–99)
GLUCOSE BLD STRIP.AUTO-MCNC: 78 MG/DL (ref 65–99)
GLUCOSE BLD STRIP.AUTO-MCNC: 94 MG/DL (ref 65–99)
GLUCOSE SERPL-MCNC: 91 MG/DL (ref 65–99)
HCT VFR BLD AUTO: 31.6 % (ref 37–47)
HGB BLD-MCNC: 10.5 G/DL (ref 12–16)
MAGNESIUM SERPL-MCNC: 2.5 MG/DL (ref 1.5–2.5)
MCH RBC QN AUTO: 29.7 PG (ref 27–33)
MCHC RBC AUTO-ENTMCNC: 33.2 G/DL (ref 32.2–35.5)
MCV RBC AUTO: 89.3 FL (ref 81.4–97.8)
PLATELET # BLD AUTO: 234 K/UL (ref 164–446)
PMV BLD AUTO: 9.9 FL (ref 9–12.9)
POTASSIUM SERPL-SCNC: 4.5 MMOL/L (ref 3.6–5.5)
RBC # BLD AUTO: 3.54 M/UL (ref 4.2–5.4)
SODIUM SERPL-SCNC: 133 MMOL/L (ref 135–145)
WBC # BLD AUTO: 6 K/UL (ref 4.8–10.8)

## 2025-04-11 PROCEDURE — A9270 NON-COVERED ITEM OR SERVICE: HCPCS

## 2025-04-11 PROCEDURE — 83735 ASSAY OF MAGNESIUM: CPT

## 2025-04-11 PROCEDURE — 85027 COMPLETE CBC AUTOMATED: CPT

## 2025-04-11 PROCEDURE — 700102 HCHG RX REV CODE 250 W/ 637 OVERRIDE(OP)

## 2025-04-11 PROCEDURE — 97535 SELF CARE MNGMENT TRAINING: CPT

## 2025-04-11 PROCEDURE — 82962 GLUCOSE BLOOD TEST: CPT | Mod: 91

## 2025-04-11 PROCEDURE — 97530 THERAPEUTIC ACTIVITIES: CPT

## 2025-04-11 PROCEDURE — 700101 HCHG RX REV CODE 250

## 2025-04-11 PROCEDURE — 80048 BASIC METABOLIC PNL TOTAL CA: CPT

## 2025-04-11 PROCEDURE — 97162 PT EVAL MOD COMPLEX 30 MIN: CPT

## 2025-04-11 PROCEDURE — 700111 HCHG RX REV CODE 636 W/ 250 OVERRIDE (IP)

## 2025-04-11 PROCEDURE — RXMED WILLOW AMBULATORY MEDICATION CHARGE: Performed by: NURSE PRACTITIONER

## 2025-04-11 PROCEDURE — 99232 SBSQ HOSP IP/OBS MODERATE 35: CPT | Performed by: FAMILY MEDICINE

## 2025-04-11 PROCEDURE — 99239 HOSP IP/OBS DSCHRG MGMT >30: CPT | Performed by: NURSE PRACTITIONER

## 2025-04-11 PROCEDURE — 97116 GAIT TRAINING THERAPY: CPT

## 2025-04-11 RX ORDER — LEVETIRACETAM 500 MG/1
500 TABLET ORAL EVERY 12 HOURS
Qty: 10 TABLET | Refills: 0 | Status: SHIPPED | OUTPATIENT
Start: 2025-04-11 | End: 2025-04-16

## 2025-04-11 RX ORDER — ACETAMINOPHEN 500 MG
1000 TABLET ORAL EVERY 6 HOURS PRN
COMMUNITY
Start: 2025-04-11

## 2025-04-11 RX ADMIN — DOCUSATE SODIUM 100 MG: 100 CAPSULE, LIQUID FILLED ORAL at 04:32

## 2025-04-11 RX ADMIN — LISINOPRIL 10 MG: 10 TABLET ORAL at 04:32

## 2025-04-11 RX ADMIN — ACETAMINOPHEN 1000 MG: 500 TABLET, FILM COATED ORAL at 12:03

## 2025-04-11 RX ADMIN — METAXALONE 400 MG: 800 TABLET ORAL at 04:31

## 2025-04-11 RX ADMIN — FOLIC ACID 1 MG: 1 TABLET ORAL at 04:32

## 2025-04-11 RX ADMIN — LEVETIRACETAM 500 MG: 500 TABLET, FILM COATED ORAL at 04:32

## 2025-04-11 RX ADMIN — ACETAMINOPHEN 1000 MG: 500 TABLET, FILM COATED ORAL at 04:31

## 2025-04-11 RX ADMIN — THERA TABS 1 TABLET: TAB at 04:32

## 2025-04-11 RX ADMIN — Medication 1 EACH: at 12:03

## 2025-04-11 RX ADMIN — NICOTINE 14 MG: 14 PATCH TRANSDERMAL at 04:30

## 2025-04-11 RX ADMIN — GABAPENTIN 100 MG: 100 CAPSULE ORAL at 13:10

## 2025-04-11 RX ADMIN — Medication 100 MG: at 04:34

## 2025-04-11 RX ADMIN — ENOXAPARIN SODIUM 30 MG: 100 INJECTION SUBCUTANEOUS at 04:31

## 2025-04-11 RX ADMIN — GABAPENTIN 100 MG: 100 CAPSULE ORAL at 04:31

## 2025-04-11 RX ADMIN — Medication 1 EACH: at 04:31

## 2025-04-11 RX ADMIN — POLYETHYLENE GLYCOL 3350 1 PACKET: 17 POWDER, FOR SOLUTION ORAL at 04:34

## 2025-04-11 ASSESSMENT — COGNITIVE AND FUNCTIONAL STATUS - GENERAL
STANDING UP FROM CHAIR USING ARMS: A LITTLE
PERSONAL GROOMING: A LITTLE
MOVING FROM LYING ON BACK TO SITTING ON SIDE OF FLAT BED: A LITTLE
DAILY ACTIVITIY SCORE: 19
CLIMB 3 TO 5 STEPS WITH RAILING: A LITTLE
TOILETING: A LITTLE
MOVING TO AND FROM BED TO CHAIR: A LITTLE
MOBILITY SCORE: 18
DRESSING REGULAR UPPER BODY CLOTHING: A LITTLE
DRESSING REGULAR LOWER BODY CLOTHING: A LITTLE
SUGGESTED CMS G CODE MODIFIER MOBILITY: CK
SUGGESTED CMS G CODE MODIFIER DAILY ACTIVITY: CK
HELP NEEDED FOR BATHING: A LITTLE
WALKING IN HOSPITAL ROOM: A LITTLE
TURNING FROM BACK TO SIDE WHILE IN FLAT BAD: A LITTLE

## 2025-04-11 ASSESSMENT — PAIN DESCRIPTION - PAIN TYPE
TYPE: ACUTE PAIN

## 2025-04-11 ASSESSMENT — ENCOUNTER SYMPTOMS
ABDOMINAL PAIN: 0
COUGH: 0
HEARTBURN: 0
SORE THROAT: 0
DIAPHORESIS: 0
NECK PAIN: 0
BLURRED VISION: 0
SPEECH CHANGE: 0
NAUSEA: 0
FLANK PAIN: 0
VOMITING: 0
TREMORS: 0
BACK PAIN: 1
WHEEZING: 0
FOCAL WEAKNESS: 0
SENSORY CHANGE: 0
NERVOUS/ANXIOUS: 0
HEADACHES: 0
SHORTNESS OF BREATH: 0
DIZZINESS: 0
PALPITATIONS: 0
MYALGIAS: 0
FEVER: 0
CHILLS: 0
WEAKNESS: 1
DIARRHEA: 0

## 2025-04-11 ASSESSMENT — GAIT ASSESSMENTS
DISTANCE (FEET): 100
DISTANCE (FEET): 60
GAIT LEVEL OF ASSIST: CONTACT GUARD ASSIST
DEVIATION: BRADYKINETIC
GAIT LEVEL OF ASSIST: STANDBY ASSIST
DEVIATION: BRADYKINETIC;DECREASED BASE OF SUPPORT;OTHER (COMMENT)
ASSISTIVE DEVICE: PLATFORM WALKER

## 2025-04-11 ASSESSMENT — PAIN SCALES - WONG BAKER: WONGBAKER_NUMERICALRESPONSE: DOESN'T HURT AT ALL

## 2025-04-11 ASSESSMENT — PAIN SCALES - PAIN ASSESSMENT IN ADVANCED DEMENTIA (PAINAD)
CONSOLABILITY: NO NEED TO CONSOLE
BODYLANGUAGE: RELAXED

## 2025-04-11 NOTE — CARE PLAN
Problem: Knowledge Deficit - Standard  Goal: Patient and family/care givers will demonstrate understanding of plan of care, disease process/condition, diagnostic tests and medications  Description: Target End Date:  1-3 days or as soon as patient condition allowsDocument in Patient Education1.  Patient and family/caregiver oriented to unit, equipment, visitation policy and means for communicating concern2.  Complete/review Learning Assessment3.  Assess knowledge level of disease process/condition, treatment plan, diagnostic tests and medications4.  Explain disease process/condition, treatment plan, diagnostic tests and medications  Outcome: Progressing     Problem: Fall Risk  Goal: Patient will remain free from falls  Description: Target End Date:  Prior to discharge or change in level of careDocument interventions on the [place] Fall Risk Assessment1.  Assess for fall risk factors2.  Implement fall precautions  Outcome: Progressing   The patient is Stable - Low risk of patient condition declining or worsening    Shift Goals  Clinical Goals: monitor neuro status, safety  Patient Goals: rest  Family Goals: cristine    Progress made toward(s) clinical / shift goals:  Patient is A0x4. Education given regarding optimal mobility and safety after discharge. Safety education given. Precautions in place. No acute neuro change noted.    Patient is not progressing towards the following goals:

## 2025-04-11 NOTE — PROGRESS NOTES
Geriatric Medicine Daily Progress Note      Date of Service  4/11/2025    Chief Complaint  89 y.o. female admitted 4/8/2025 with Fall    Hospital Course  89 y.o. female who presented 4/8/2025 with fall resulting in subdural hematoma, right wrist fracture.  Trauma surgery admitted the patient to ICU.  Neurosurgery and orthopedic surgery were consulted on the case.  Patient underwent ORIF of the right distal radius 4/9/2025.  Patient admits to frequent falls, supposed to use a walker as an assistive device but does not do so, admits to alcohol use of 2-4 drinks daily, for the last 2 years.  She denies any withdrawal symptoms in the past.  She denies any cognitive or hearing impairment.  States she has vision impairment but only needs to wear reading glasses.  Patient states she is independent in ADLs and IADLs, continues to drive.     Interval Problem Update  Wrist fracture - pain controlled  SDH - denies headache  HTN - sbp 105-129  Alcohol - no tremors or signs of withdrawal  Hyponatremia - 133    Consultants/Specialty  Trauma surgery  Orthopedic surgery     Code Status  Full    Disposition  Home health    Review of Systems  Review of Systems   Constitutional:  Negative for chills, diaphoresis, fever and malaise/fatigue.   HENT:  Negative for congestion, hearing loss and sore throat.    Eyes:  Negative for blurred vision.   Respiratory:  Negative for cough, shortness of breath and wheezing.    Cardiovascular:  Negative for chest pain, palpitations and leg swelling.   Gastrointestinal:  Negative for abdominal pain, diarrhea, heartburn, nausea and vomiting.   Genitourinary:  Negative for dysuria, flank pain and hematuria.   Musculoskeletal:  Positive for back pain and joint pain. Negative for myalgias and neck pain.   Skin:  Negative for rash.   Neurological:  Positive for weakness. Negative for dizziness, tremors, sensory change, speech change, focal weakness and headaches.   Psychiatric/Behavioral:  The patient is  not nervous/anxious.         Physical Exam  Temp:  [36.2 °C (97.2 °F)-36.3 °C (97.3 °F)] 36.3 °C (97.3 °F)  Pulse:  [55-68] 59  Resp:  [14-18] 16  BP: (105-129)/(51-59) 105/53  SpO2:  [91 %-97 %] 94 %    Physical Exam  Vitals and nursing note reviewed.   HENT:      Head: Normocephalic.      Comments: Right facial abrasion and ecchymosis     Nose: No congestion.      Mouth/Throat:      Mouth: Mucous membranes are moist.   Eyes:      Conjunctiva/sclera: Conjunctivae normal.   Cardiovascular:      Rate and Rhythm: Normal rate and regular rhythm.      Heart sounds: Murmur heard.   Pulmonary:      Effort: Pulmonary effort is normal.      Breath sounds: Normal breath sounds.   Abdominal:      General: There is no distension.      Tenderness: There is no abdominal tenderness. There is no guarding or rebound.   Musculoskeletal:      Cervical back: No tenderness.      Right lower leg: Edema present.      Left lower leg: Edema present.      Comments: Right wrist brace   Skin:     Findings: Bruising present.      Comments: Stasis changes both legs   Neurological:      General: No focal deficit present.      Mental Status: She is alert and oriented to person, place, and time.      Cranial Nerves: No cranial nerve deficit.      Motor: No tremor.     Yeah I did request admit time he said to request to admit time he said his dad like I looked at his 8/2023    CAM  Acute onset and fluctuating course   No   Inattention                                         No   Disorganized thinking                        No   Altered level of consciousness          No     Medication Review    Yes    Laboratory  Recent Labs     04/09/25 0315 04/10/25  0507 04/11/25  0658   WBC 9.3 5.1 6.0   RBC 3.99* 3.78* 3.54*   HEMOGLOBIN 11.5* 11.1* 10.5*   HEMATOCRIT 35.3* 33.7* 31.6*   MCV 88.5 89.2 89.3   MCH 28.8 29.4 29.7   MCHC 32.6 32.9 33.2   RDW 46.2 46.4 47.7   PLATELETCT 276 242 234   MPV 9.9 10.0 9.9     Recent Labs     04/09/25 0315  04/10/25  0507 04/11/25  0658   SODIUM 133* 132* 133*   POTASSIUM 4.0 4.7 4.5   CHLORIDE 103 104 105   CO2 19* 19* 22   GLUCOSE 99 120* 91   BUN 19 22 26*   CREATININE 1.00 0.90 1.11   CALCIUM 7.8* 8.6 8.4*     Recent Labs     04/09/25  0315   INR 1.07               Imaging  MR-CERVICAL SPINE-W/O   Final Result      1.  No acute posttraumatic sequelae.   2.  Mild degenerative disease as described above.      CT-TSPINE W/O PLUS RECONS   Final Result      1.  Age-indeterminate but chronic appearing mild T12 compression and Schmorl's node deformity. Correlate with point tenderness.   2.  No definite acute fracture or dislocation.   3.  Mild to moderate spondylosis.   4.  Additional findings as described.      DX-THORACIC SPINE-2 VIEWS   Final Result         1.  No acute traumatic bony injury of the thoracic spine.   2.  Atherosclerosis      DX-LUMBAR SPINE-2 OR 3 VIEWS   Final Result         1.  Anterior wedge deformity of T11 and T12, compatible with age indeterminate compression fractures, particularly at T12. Could be further evaluated with CT of the thoracolumbar junction for further characterization.   2.  Mild anterolisthesis L4 on L5.   3.  Atherosclerosis      DX-TIBIA AND FIBULA RIGHT   Final Result         1.  No acute traumatic bony injury.      DX-TIBIA AND FIBULA LEFT   Final Result         1.  No acute traumatic bony injury.      DX-WRIST-COMPLETE 3+ RIGHT   Final Result      Digitized intraoperative radiograph is submitted for review. This examination is not for diagnostic purpose but for guidance during a surgical procedure. Please see the patient's chart for full procedural details.         INTERPRETING LOCATION: [address] [place] [address], [id number]      DX-PORTABLE FLUORO > 1 HOUR   Final Result      Portable fluoroscopy utilized for 9 seconds.         INTERPRETING LOCATION: [address] [place] [address], [id number]      US-TRAUMA VEIN SCREEN LOWER BILAT EXTREMITY   Final Result      CT-MAXILLOFACIAL W/O PLUS RECONS    Final Result         1.  No acute traumatic facial bony injuries identified.   2.  Bilateral sinusitis changes   3.  Atherosclerosis      CT-HEAD W/O   Final Result         1.  2.6 mm right parietal subdural hematoma, stable since prior study.   2.  Calcified mass abutting the anterior right falx, appearance compatible with meningioma.   3.  Bilateral sinusitis changes, greatest in the right maxillary sinus.   4.  Nonspecific white matter changes, commonly associated with small vessel ischemic disease.  Associated mild cerebral atrophy is noted.      DX-WRIST-COMPLETE 3+ RIGHT   Final Result         1.  Distal radial metaphyseal fracture, partially reduced      DX-WRIST-COMPLETE 3+ RIGHT   Final Result         1.  Distal radial metaphyseal fracture      CT-CSPINE WITHOUT PLUS RECONS   Final Result         1.  Multilevel degenerative changes of the cervical spine limit diagnostic sensitivity of this examination, otherwise no acute traumatic bony injury of the cervical spine is apparent.   2.  Atherosclerosis      CT-HEAD W/O   Final Result         1.  2.9 mm right parietal subdural hematoma.   2.  Calcified mass abutting the anterior right falx, appearance compatible with meningioma.   3.  Bilateral maxillary and ethmoid sinusitis changes   4.  Nonspecific white matter changes, commonly associated with small vessel ischemic disease.  Associated mild cerebral atrophy is noted.      Based solely on CT findings, the brain injury guideline category is mBIG 1.      SDH < 4mm   IPH < 4mm   SAH < 3 sulci and < 1mm      The original BIG retrospective analysis found radiographic progression in 0% of BIG 1 patients and 2.6% BIG 2.      These findings were discussed with the patient's clinician, David Sorenson, on 4/8/2025 9:33 PM.           Assessment/Plan  * Trauma- (present on admission)  Assessment & Plan  Trauma surgery-primary service    Frequent falls- (present on admission)  Assessment & Plan  PT and OT    Right wrist  fracture, closed, initial encounter- (present on admission)  Assessment & Plan  Open reduction internal fixation right extra-articular distal radius fracture  4/9/2025  Pain control    Alcohol abuse- (present on admission)  Assessment & Plan  thiamine, folate, multivitamin  No signs of withdrawal -CIWA protocol discontinued    Subdural hematoma, post-traumatic (HCC)- (present on admission)  Assessment & Plan  Neurochecks  Keppra    Compression fracture of thoracic vertebra (HCC)- (present on admission)  Assessment & Plan  Chronic?     Hypomagnesemia- (present on admission)  Assessment & Plan  IV Mg given  Follow level    Stasis dermatitis of both legs- (present on admission)  Assessment & Plan  Wound care    Bilateral lower extremity edema- (present on admission)  Assessment & Plan  Recommend resume Lasix    Chronic hyponatremia- (present on admission)  Assessment & Plan  free water restriction  urine sodium pending  Follow bmp    Stage 3a chronic kidney disease- (present on admission)  Assessment & Plan  Follow BMP    Essential hypertension- (present on admission)  Assessment & Plan  Lisinopril    Dyslipidemia- (present on admission)  Assessment & Plan  Simvastatin    Tobacco abuse- (present on admission)  Assessment & Plan  Nicotine placement protocol         [place] [facility] [place]    Geriatric Medicine will sign off at this time. Please note recommendations on Assessment and Plan. Thank you for this consult. Please reconsult if needed.

## 2025-04-11 NOTE — DISCHARGE PLANNING
1212  DPA received DME choice form, placed in media and sent referral.    1315  Agency/Facility Name: [facility]  Spoke To: Brian  Outcome: DPA inquired follow up on pending DME referral, per Brian will follow up with rep and follow up with DPA.   DPA advised RN CM via teams    9046  Agency/Facility Name: [facility]  Spoke To: Brian  Outcome: DPA inquired follow up on pending DME referral, per Brian rep is on lunch and will follow up, DPA advised Brian Pt is medically cleared to discharge.

## 2025-04-11 NOTE — FACE TO FACE
Face to Face Note  -  Durable Medical Equipment    JACE Arango. - NPI: [id number]  I certify that this patient is under my care and that they have had a durable medical equipment(DME)face to face encounter by myself that meets the physician DME face-to-face encounter requirements with this patient on:    Date of encounter:   Patient:                    MRN:                       YOB: 2024  Lily Lloyd  [id number]  1935     The encounter with the patient was in whole, or in part, for the following medical condition, which is the primary reason for durable medical equipment:  Other - wrist fx    I certify that, based on my findings, the following durable medical equipment is medically necessary:  Walkers and Other DME Equipment - platform .        ------------------------------------------------------------------------------------------------------------------    Face to Face Supporting Documentation - Home Health    The encounter with this patient was in whole or in part the primary reason for home health admission.    Date of encounter:   Patient:                    MRN:                       YOB: 2024  Lily Lloyd  [id number]  1935     Home health to see patient for:  Skilled Nursing care for assessment, interventions & education, Medical social work consult, and Physical Therapy evaluation and treatment    Skilled need for:  New Onset Medical Diagnosis recent fall, SDH, wrist fx    Skilled nursing interventions to include:  Comment: med management    Homebound evidenced status by:  Need the aid of supportive devices such as crutches, canes, wheelchairs or walkers or Needs the assistance of another person in order to leave the home. Leaving home must require a considerable and taxing effort. There must exist a normal inability to leave the home.    Community Physician to provide follow up care: RONNI Crane     Optional Interventions     Wound information & treatment:    Home Infusion Therapy orders:    Line/Drain/Airway:    I certify the face to face encounter for this home care referral meets the CMS requirements and the encounter/clinical assessment with the patient was, in whole, or in part, for the medical condition(s) listed above, which is the primary reason for home health care. Based on my clinical findings: the service(s) are medically necessary, support the need for home health care, and the homebound criteria are met.  I certify that this patient has had a face to face encounter by myself.  JACE Arango. - NPI: [id number]    *Debility, frailty and advanced age in the absence of an acute deterioration or exacerbation of a condition do not qualify a patient for home health.

## 2025-04-11 NOTE — PROGRESS NOTES
"      Orthopaedic Progress Note    Interval changes:  Patient doing well post op  RUE in short arm splint, dressings are CDI  Cleared for DC to home by ortho pending medicine clearance    ROS - Patient denies any new issues.  Pain well controlled.    /59   Pulse 66   Temp 36.3 °C (97.3 °F) (Temporal)   Resp 15   Ht 1.575 m (5' 2\")   Wt 66.7 kg (147 lb 0.8 oz)   SpO2 97%     Patient seen and examined  No acute distress  Breathing non labored  RRR  RUE in short arm splint, DNVI, moves all fingers, cap refill <2 sec.     Recent Labs     04/08/25  2040 04/09/25  0315 04/10/25  0507   WBC 6.5 9.3 5.1   RBC 4.01* 3.99* 3.78*   HEMOGLOBIN 12.0 11.5* 11.1*   HEMATOCRIT 35.3* 35.3* 33.7*   MCV 88.0 88.5 89.2   MCH 29.9 28.8 29.4   MCHC 34.0 32.6 32.9   RDW 47.1 46.2 46.4   PLATELETCT 286 276 242   MPV 9.4 9.9 10.0       Active Hospital Problems    Diagnosis     Right wrist fracture, closed, initial encounter [S62.101A]      Priority: High    Pain of neck with recent traumatic injury [M54.2]      Priority: High    Subdural hematoma, post-traumatic (HCC) [S06.5XAA]      Priority: High    Thoracic and lumbar back pain [M54.6]      Priority: Medium    Abrasion, face without infection [S00.81XA]      Priority: Medium    No contraindication to deep vein thrombosis (DVT) prophylaxis [Z78.9]      Priority: Medium    Alcohol abuse [F10.10]      Priority: Medium    Chronic hyponatremia [E87.1]      Priority: Medium    Stage 3a chronic kidney disease [N18.31]      Priority: Medium     SCP-SHENG. \"Need to include - controlled with current medications or give update on current work up/treatment\", GFR 50/41 on 9/25/14      Electrolyte abnormality [E87.8]      Priority: Low    Encounter for geriatric assessment [Z01.89]      Priority: Low    Tib/Fib pain, bilateral [M79.604, M79.605]      Priority: Low    Trauma [T14.90XA]      Priority: Low    Dyslipidemia [E78.5]      Priority: Low    Tobacco abuse [Z72.0]      Priority: Low    " Essential hypertension [I10]      Priority: Low    Hypomagnesemia [E83.42]     Compression fracture of thoracic vertebra (HCC) [S22.000A]     Frequent falls [R29.6]     Bilateral lower extremity edema [R60.0]     Stasis dermatitis of both legs [I87.2]        Assessment/Plan:  Patient doing well post op  RUE in short arm splint, dressings are CDI  Cleared for DC to home by ortho pending medicine clearance  POD#1 S/P Open reduction term fixation right extra-articular distal radius fracture  Wt bearing status - RUE light ADLs only  Wound care/Drains - splint and dressings to be left in place  Future Procedures - none planned   Lovenox: Start 4/10, Duration-until ambulatory > 150'  Sutures/Staples out- 14-21 days post operatively. Removal by ortho mid levels only.  PT/OT-initiated  Antibiotics: Perioperative completed  DVT Prophylaxis- TEDS/SCDs/Foot pumps  Deutsch-not needed per ortho  Case Coordination for Discharge Planning - Disposition per therapy recs.

## 2025-04-11 NOTE — DISCHARGE SUMMARY
"  Trauma Discharge Summary    DATE OF ADMISSION: 4/8/2025    DATE OF DISCHARGE: 4/11/2025    LENGTH OF STAY: 3 days    ATTENDING PHYSICIAN: Naom Evangelista M.D.    CONSULTING PHYSICIAN:   1.  Eladio Ervin MD, orthopedic surgery  2.  Santi Rawls MD, geriatrics    DISCHARGE DIAGNOSIS:  Principal Problem:    Trauma  Active Problems:    Subdural hematoma, post-traumatic (HCC)    Right wrist fracture, closed, initial encounter    Pain of neck with recent traumatic injury    Stage 3a chronic kidney disease (Chronic)      Overview: [facility]. \"Need to include - controlled with current medications or give       update on current work up/treatment\", GFR 50/41 on 9/25/14    No contraindication to deep vein thrombosis (DVT) prophylaxis    Alcohol abuse    Chronic hyponatremia    Abrasion, face without infection    Thoracic and lumbar back pain    Frequent falls    Bilateral lower extremity edema    Stasis dermatitis of both legs    Hypomagnesemia    Compression fracture of thoracic vertebra (HCC)    Essential hypertension    Tobacco abuse    Dyslipidemia    Encounter for geriatric assessment    Tib/Fib pain, bilateral    Electrolyte abnormality  Resolved Problems:    * No resolved hospital problems. *      PROCEDURES:  1.  On 4/9/2025 Dr. Ervin performed an open reduction internal fixation of a right extra-articular distal radial fracture    HISTORY OF PRESENT ILLNESS: The patient is a 89 y.o. female who was reportedly injured in a mechanical ground-level fall.  She was transferred to [facility] in [place], [place].    HOSPITAL COURSE: The patient was triaged as a partial trauma activation. The patient was transported to intensive care unit where she remained for a short period of time.  She was then transferred to the [facility] where she remained for her stay.  Admit imaging noted a subdural hematoma.  Follow-up imaging was stable.  A cognitive evaluation was complete with no further recommendations.  She " also suffered a distal right metaphyseal fracture.  This was repaired in the operating room.  Please see the above procedure.  She will follow-up in 2 weeks with orthopedics.  She also complained of some neck pain.  CT C-spine was inconclusive and MRI was unremarkable.  As of today her neck pain has resolved.  The collar is off.  She can have a soft collar as needed.  She was quite intoxicated.  She does admit to drinking daily.  Aspirate was completed.  She was counseled.  Therapies recommended postacute services but she declined.  She did accept home health services.  These have been ordered.  And on day of discharge she was tolerating a regular diet.  She was ambulating with a front wheel walker with a platform.  She was aware of her follow-up.  And she had adequate pain control.      HOSPITAL PROBLEM LIST:  * Trauma- (present on admission)  Assessment & Plan  Ground level fall.  T-5000 Activation.  Noam Evangelista MD. Trauma Surgery.    Pain of neck with recent traumatic injury- (present on admission)  Assessment & Plan  Complaints of neck pain  Unable to clear C-spine due to intoxication  CT C-spine inconclusive due to chronic degenerative changes  Continue C-collar until sober and rexamine  4/10 Continues with neck pain. MRI unremarkable  Soft collar for comfort    Right wrist fracture, closed, initial encounter- (present on admission)  Assessment & Plan  Distal right metaphyseal fracture.  Closed reduction and splinted in ED.  4/10 Open reduction term fixation right extra-articular distal radius fracture.  Weight bearing status - Okay to use the RUE for light activities under 1 or 2 pounds such as ADLs. Continue to use the wrist brace at all times. Work on range of motion of the fingers and elbow early and often.   Eladio Ervin MD. Orthopedic Surgeon. [facility].  Follow up in 2 weeks.    Subdural hematoma, post-traumatic (HCC)- (present on admission)  Assessment & Plan  2.9mm right parietal  subdural hematoma.  mBIG 2.  Interval head CT stable.   Non-operative management.  Post traumatic pharmacologic seizure prophylaxis for 1 week.  Speech Language Pathology cognitive evaluation. No further recommendations on discharge  Neurosurgery consultation not indicated.    Thoracic and lumbar back pain- (present on admission)  Assessment & Plan  4/9 Pain on palpation to thoracic and lumbar.   - Xray with anterior wedge deformity of T11 and T12, compatible with age indeterminate compression fractures, particularly at T12.   4/10 CT TSpine with age-indeterminate but chronic appearing mild T12 compression and Schmorl's node deformity.   Multimodal pain regimen    Abrasion, face without infection- (present on admission)  Assessment & Plan  CT max-face without fracture.  Topical care.    Chronic hyponatremia- (present on admission)  Assessment & Plan  Serum Na 132, baseline 127-134 in setting of alcoholism.  Gentle IVF.   Trend.  4/10 Sodium 132. Urine sodium pending per Geriatrics    Alcohol abuse- (present on admission)  Assessment & Plan  Admission blood alcohol level of 208mg/dL.  Trauma alcohol withdrawal protocol initiated.  PO vitamins.  SBIRT completed. Patient drinks daily. Previous admissions for GLF secondary to intoxication    No contraindication to deep vein thrombosis (DVT) prophylaxis- (present on admission)  Assessment & Plan  VTE prophylaxis initially contraindicated secondary to elevated bleeding risk.  4/9 Trauma screening bilateral lower extremity venous duplex negative for above knee DVT.  4/10 Prophylactic dose enoxaparin 30 mg BID initiated.   VTE Prophylaxis approved by Trauma Surgery.    Stage 3a chronic kidney disease- (present on admission)  Assessment & Plan  Cr 1.21 from baseline 0.9.   GFR 43.  Avoid nephrotoxins.   Renally dose medications.  Trend renal indices.   Gentle hydration.    Electrolyte abnormality- (present on admission)  Assessment & Plan  4/10 Magnesium 1.8  - Replete and  monitor    Tib/Fib pain, bilateral- (present on admission)  Assessment & Plan  4/9 Pain with palpation to bilateral Tib/fib  - imaging with no acute fracture.    Encounter for geriatric assessment- (present on admission)  Assessment & Plan  4/9 The patient is 75 years old or older and a geriatrics consult is indicated  Santi Rawls MD, Geriatric Hospitalist.    Dyslipidemia- (present on admission)  Assessment & Plan  Chronic condition treated with simvastatin.  4/9 Resumed maintenance medication.    Tobacco abuse- (present on admission)  Assessment & Plan  Nicotine replacement.  Cessation education.    Essential hypertension- (present on admission)  Assessment & Plan  Chronic condition treated with lasix and lisinopril.  4/9 Resumed lisinopril.          DISPOSITION: Discharged home on 4/11/2025. The patient was counseled and questions were answered. Specifically, signs and symptoms of infection, respiratory decompensation, change in condition or worsening condition and persistent or worsening pain were discussed and the patient agrees to seek medical attention if any of these develop.    DISCHARGE MEDICATIONS:  The patients controlled substance history was reviewed and a controlled substance use informed consent (if applicable) was provided by [facility] and the patient has been prescribed.     Medication List        PAUSE taking these medications        Instructions   NON SPECIFIED  Wait to take this until: April 23, 2025   Take 10 mg by mouth every day. Blood thinner  Dose: 10 mg            START taking these medications        Instructions   acetaminophen 500 MG Tabs  Commonly known as: Tylenol   Take 2 Tablets by mouth every 6 hours as needed for Mild Pain or Moderate Pain.  Dose: 1,000 mg     levETIRAcetam 500 MG Tabs  Commonly known as: Keppra   Take 1 Tablet by mouth every 12 hours for 5 days.  Dose: 500 mg            CONTINUE taking these medications        Instructions   furosemide  40 MG Tabs  Commonly known as: Lasix   Take 40 mg by mouth every day.  Dose: 40 mg     lisinopril 10 MG Tabs  Commonly known as: Prinivil   Take 1 Tablet by mouth every day.  Dose: 10 mg     loratadine 10 MG Tabs  Commonly known as: Claritin   Take 10 mg by mouth every day.  Dose: 10 mg     multivitamin Tabs   Take 1 Tablet by mouth every day.  Dose: 1 Tablet     potassium chloride SA 20 MEQ Tbcr  Commonly known as: Kdur   Take 1 Tablet by mouth every day.  Dose: 20 mEq     simvastatin 10 MG Tabs  Commonly known as: Zocor   Take 10 mg by mouth every evening.  Dose: 10 mg     thiamine 100 MG tablet  Commonly known as: Thiamine   Take 1 Tablet by mouth every day.  Dose: 100 mg            STOP taking these medications      doxycycline 100 MG Tabs  Commonly known as: Vibramycin              ACTIVITY:  Platform weightbearing on affected extremity    WOUND CARE:  Follow-up with orthopedics for suture/staple removal  Over-the-counter antibiotic ointment to face 2 times per day    DIET:  Orders Placed This Encounter   Procedures    Diet Order Diet: Consistent CHO (Diabetic); Fluid modifications: (optional): Free Water Restrictions Only     Standing Status:   Standing     Number of Occurrences:   1     Diet::   Consistent CHO (Diabetic) [4]     Fluid modifications: (optional):   Free Water Restrictions Only [12]       FOLLOW UP:  Eladio Ervin M.D.  [address]  [facility] [id number] [phone]    Follow up  Follow-up in 2 weeks for wrist fracture    [facility]  [address]  [facility] [id number] [phone]          TIME SPENT ON DISCHARGE: 35 minutes      ____________________________________________  RONNI Arango    DD: 4/11/2025 11:50 AM

## 2025-04-11 NOTE — PROGRESS NOTES
Trauma / Surgical Daily Progress Note    Date of Service  4/11/2025    Chief Complaint  89 y.o. female admitted 4/8/2025 as a T 5000 - GLF / intoxicated - SDH and right wrist fracture    POD # 1 - Right wrist repair     Interval Events  Transferred to [facility]  MRI cervical without injury  CT T spine with chronic appearing fracture  Sodium 133 (132)    - Soft collar PRN  - Continue free water restriction  - FWW with platform ordered   - HH placed  - Declines post acute    Plan home this afternoon     Assessment/Plan  * Trauma- (present on admission)  Assessment & Plan  Ground level fall.  T-5000 Activation.  Noam Evangelista MD. Trauma Surgery.    Pain of neck with recent traumatic injury- (present on admission)  Assessment & Plan  Complaints of neck pain  Unable to clear C-spine due to intoxication  CT C-spine inconclusive due to chronic degenerative changes  Continue C-collar until sober and rexamine  4/10 Continues with neck pain. MRI unremarkable  Soft collar for comfort    Right wrist fracture, closed, initial encounter- (present on admission)  Assessment & Plan  Distal right metaphyseal fracture.  Closed reduction and splinted in ED.  4/10 Open reduction term fixation right extra-articular distal radius fracture.  Weight bearing status - Okay to use the RUE for light activities under 1 or 2 pounds such as ADLs. Continue to use the wrist brace at all times. Work on range of motion of the fingers and elbow early and often.   Eladio Ervin MD. Orthopedic Surgeon. [facility].  Follow up in 2 weeks.    Subdural hematoma, post-traumatic (HCC)- (present on admission)  Assessment & Plan  2.9mm right parietal subdural hematoma.  mBIG 2.  Interval head CT stable.   Non-operative management.  Post traumatic pharmacologic seizure prophylaxis for 1 week.  Speech Language Pathology cognitive evaluation. No further recommendations on discharge  Neurosurgery consultation not indicated.    Thoracic and lumbar back  pain- (present on admission)  Assessment & Plan  4/9 Pain on palpation to thoracic and lumbar.   - Xray with anterior wedge deformity of T11 and T12, compatible with age indeterminate compression fractures, particularly at T12.   4/10 CT TSpine with age-indeterminate but chronic appearing mild T12 compression and Schmorl's node deformity.   Multimodal pain regimen    Abrasion, face without infection- (present on admission)  Assessment & Plan  CT max-face without fracture.  Topical care.    Chronic hyponatremia- (present on admission)  Assessment & Plan  Serum Na 132, baseline 127-134 in setting of alcoholism.  Gentle IVF.   Trend.  4/10 Sodium 132. Urine sodium pending per Geriatrics    Alcohol abuse- (present on admission)  Assessment & Plan  Admission blood alcohol level of 208mg/dL.  Trauma alcohol withdrawal protocol initiated.  PO vitamins.  SBIRT completed. Patient drinks daily. Previous admissions for GLF secondary to intoxication    No contraindication to deep vein thrombosis (DVT) prophylaxis- (present on admission)  Assessment & Plan  VTE prophylaxis initially contraindicated secondary to elevated bleeding risk.  4/9 Trauma screening bilateral lower extremity venous duplex negative for above knee DVT.  4/10 Prophylactic dose enoxaparin 30 mg BID initiated.   VTE Prophylaxis approved by Trauma Surgery.    Stage 3a chronic kidney disease- (present on admission)  Assessment & Plan  Cr 1.21 from baseline 0.9.   GFR 43.  Avoid nephrotoxins.   Renally dose medications.  Trend renal indices.   Gentle hydration.    Electrolyte abnormality- (present on admission)  Assessment & Plan  4/10 Magnesium 1.8  - Replete and monitor    Tib/Fib pain, bilateral- (present on admission)  Assessment & Plan  4/9 Pain with palpation to bilateral Tib/fib  - imaging with no acute fracture.    Encounter for geriatric assessment- (present on admission)  Assessment & Plan  4/9 The patient is 75 years old or older and a geriatrics  consult is indicated  Santi Rawls MD, Geriatric Hospitalist.    Dyslipidemia- (present on admission)  Assessment & Plan  Chronic condition treated with simvastatin.  4/9 Resumed maintenance medication.    Tobacco abuse- (present on admission)  Assessment & Plan  Nicotine replacement.  Cessation education.    Essential hypertension- (present on admission)  Assessment & Plan  Chronic condition treated with lasix and lisinopril.  4/9 Resumed lisinopril.

## 2025-04-11 NOTE — DISCHARGE INSTRUCTIONS
- Call or seek medical attention for questions or concerns  - Follow up with the [facility]: PRN  - Follow up with Dr. Palm in 2 weeks time  - Avoid all blood thinners including aspirin or NSAIDs (ibuprophen, Advil, Aleve, Motrin) for at least two weeks  - Follow up with primary care provider within one weeks time  - Resume regular diet  - May take over the counter acetaminophen as needed for pain  - Continue daily over the counter stool softener while on narcotics  - No operation of machinery or motorized vehicles while under the influence of narcotics  - No alcohol, marijuana or illicit drug use while under the influence of narcotics  - In the event of a narcotic overdose naloxone (Narcan) is available without a prescription from any [facility] or [facility]  - No swimming, hot tubs, baths or wound submersion until cleared by outpatient provider. May shower  - No contact sports, strenuous activities, or heavy lifting until cleared by outpatient provider  - If respiratory decompensation, persistent or worsening pain, change in condition or worsening condition, or signs or symptoms of infection occur seek medical attention

## 2025-04-11 NOTE — THERAPY
Physical Therapy   Daily Treatment     Patient Name: Lily Lloyd  Age:  89 y.o., Sex:  female  Medical Record #: [id number]  Today's Date: 4/11/2025     Precautions  Precautions: Fall Risk;Platform Weight Bearing Right Upper Extremity;Other (See Comments)  Comments: HOB >30, RUE-short arm splint at all times, Light ADLs only, <1-2 lbs,  ROMAT of fingers and elbow    Assessment    Patient seen for PT treatment session. Patient in bed, agreeable for the session. Able to participate with bed mobility, EOB sitting, sit-stand, toilet transfer, ambulation with platform FWW, navigating stairs as detailed below. Will continue to benefit from PT services and recommend  PT at this time.     Plan    Treatment Plan Status:    Type of Treatment: Bed Mobility, Equipment, Family / Caregiver Training, Gait Training, Neuro Re-Education / Balance, Stair Training, Therapeutic Activities, Therapeutic Exercise  Treatment Frequency: 4 Times per Week  Treatment Duration: Until Therapy Goals Met    DC Equipment Recommendations:  (Platform FWW)  Discharge Recommendations: Recommend home health for continued physical therapy services (With family supervision and assistance)    Objective       04/11/25 1342   Time In/Time Out   Therapy Start Time 1329   Therapy End Time 1342   Total Therapy Time 13   Precautions   Precautions Fall Risk;Platform Weight Bearing Right Upper Extremity;Other (See Comments)   Comments HOB >30, RUE-short arm splint at all times, Light ADLs only, <1-2 lbs,  ROMAT of fingers and elbow   Other Treatments   Other Treatments Provided Per OT's discussion with Ortho PA, patient is allowed Platform WB on R, and plan is for patient to [place] home today. Patient seen for 2nd session for gait training with platform FWW and stair training.   Balance   Sitting Balance (Static) Fair   Sitting Balance (Dynamic) Fair   Standing Balance (Static) Fair -   Standing Balance (Dynamic) Fair -   Weight Shift Sitting Good   Weight Shift  Standing Good   Skilled Intervention Verbal Cuing;Facilitation   Comments Seated EOB; Standing with platform FWW. Reinforced to focus on balance when performing functional tasks at home.   Bed Mobility    Supine to Sit Supervised   Sit to Supine Supervised   Scooting Supervised   Rolling Supervised   Skilled Intervention Verbal Cuing   Comments HOB raised, towards R side   Gait Analysis   Gait Level Of Assist Standby Assist   Assistive Device Platform Walker   Distance (Feet) 100   # of Times Distance was Traveled 2   Deviation Bradykinetic   # of Stairs Climbed 10  (1 side rail support)   Level of Assist with Stairs Standby Assist   Weight Bearing Status RUE-Platform WB   Skilled Intervention Verbal Cuing;Facilitation   Comments Gait-Cues for appropriate placment for RUE on platform, pacing, directions. Stairs-cues for pacing. Recommended to use stair lift inside and 1 person supervision on 1STE home. Reinforced to continue using Platform FWW at this time.   Functional Mobility   Sit to Stand Standby Assist   Toilet Transfers Standby Assist   Transfer Method Stand Step   Mobility Bed-EOB-sit-stand-ambulate in the room & hallway-navigate stairs-ambulate in the room-EOB-bed-HOB raised-EOB-sit-stand-ambulate to bathroom-toilet transfer-hand off to CNA   Skilled Intervention Verbal Cuing;Facilitation   Comments W Platform FWW; cues for hand placement, LE placement, sequencing   Patient / Family Goals    Patient / Family Goal #1 To return home   Goal #1 Outcome Progressing as expected   Short Term Goals    Short Term Goal # 1 Patient will perform supine-sit, sit-supine with HOB flat without rails with supervision in 6 visits to safely get in & out of bed   Goal Outcome # 1 Progressing as expected   Short Term Goal # 2 Patient will perform sit-stand with LRAD with supervision in 6 visits to progress with functional mobility   Goal Outcome # 2 Progressing as expected   Short Term Goal # 3 Patient will perform chair  transfers with LRAD with supervision in 6 visits to safely get OOB to chair   Goal Outcome # 3 Progressing as expected   Short Term Goal # 4 Patient will ambulate 200 feet with LRAD with supervision in 6 visits to safely ambulate household & community distance   Goal Outcome # 4 Progressing as expected   Short Term Goal # 5 Patient will negotiate 1 step with supervision in 6 visits to safely get in & out home   Goal Outcome # 5 Progressing as expected   Anticipated Discharge Equipment and Recommendations   DC Equipment Recommendations   (Platform FWW)   Discharge Recommendations Recommend home health for continued physical therapy services  (With family supervision and assistance)   Interdisciplinary Plan of Care Collaboration   IDT Collaboration with  Nursing;Occupational Therapist;Certified Nursing Assistant   Patient Position at End of Therapy Seated  (CNA present; In the bathroom)   Session Information   Date / Session Number  4/11-2(2/4, 4/17)

## 2025-04-11 NOTE — PROGRESS NOTES
"      Orthopaedic Progress Note    Interval changes:  Patient doing well    RUE in short arm splint, dressings are CDI  Cleared for DC to home by ortho pending medicine clearance    ROS - Patient denies any new issues.  Pain well controlled.    /54   Pulse 60   Temp 36.3 °C (97.3 °F) (Temporal)   Resp 16   Ht 1.575 m (5' 2\")   Wt 66.7 kg (147 lb 0.8 oz)   SpO2 91%     Patient seen and examined  No acute distress  Breathing non labored  RRR  RUE in short arm splint, DNVI, moves all fingers, cap refill <2 sec.     Recent Labs     04/09/25  0315 04/10/25  0507 04/11/25  0658   WBC 9.3 5.1 6.0   RBC 3.99* 3.78* 3.54*   HEMOGLOBIN 11.5* 11.1* 10.5*   HEMATOCRIT 35.3* 33.7* 31.6*   MCV 88.5 89.2 89.3   MCH 28.8 29.4 29.7   MCHC 32.6 32.9 33.2   RDW 46.2 46.4 47.7   PLATELETCT 276 242 234   MPV 9.9 10.0 9.9       Active Hospital Problems    Diagnosis     Right wrist fracture, closed, initial encounter [S62.101A]      Priority: High    Pain of neck with recent traumatic injury [M54.2]      Priority: High    Subdural hematoma, post-traumatic (HCC) [S06.5XAA]      Priority: High    Thoracic and lumbar back pain [M54.6]      Priority: Medium    Abrasion, face without infection [S00.81XA]      Priority: Medium    No contraindication to deep vein thrombosis (DVT) prophylaxis [Z78.9]      Priority: Medium    Alcohol abuse [F10.10]      Priority: Medium    Chronic hyponatremia [E87.1]      Priority: Medium    Stage 3a chronic kidney disease [N18.31]      Priority: Medium     [facility]. \"Need to include - controlled with current medications or give update on current work up/treatment\", GFR 50/41 on 9/25/14      Electrolyte abnormality [E87.8]      Priority: Low    Encounter for geriatric assessment [Z01.89]      Priority: Low    Tib/Fib pain, bilateral [M79.604, M79.605]      Priority: Low    Trauma [T14.90XA]      Priority: Low    Dyslipidemia [E78.5]      Priority: Low    Tobacco abuse [Z72.0]      Priority: Low    " Essential hypertension [I10]      Priority: Low    Hypomagnesemia [E83.42]     Compression fracture of thoracic vertebra (HCC) [S22.000A]     Frequent falls [R29.6]     Bilateral lower extremity edema [R60.0]     Stasis dermatitis of both legs [I87.2]        Assessment/Plan:  Patient doing well   RUE in short arm splint, dressings are CDI  Cleared for DC to home by ortho pending medicine clearance  POD#2 S/P Open reduction term fixation right extra-articular distal radius fracture  Wt bearing status - RUE light ADLs only  Wound care/Drains - splint and dressings to be left in place  Future Procedures - none planned   Lovenox: Start 4/10, Duration-until ambulatory > 150'  Sutures/Staples out- 14-21 days post operatively. Removal by ortho mid levels only.  PT/OT-initiated  Antibiotics: Perioperative completed  DVT Prophylaxis- TEDS/SCDs/Foot pumps  Deutsch-not needed per ortho  Case Coordination for Discharge Planning - Disposition per therapy recs.

## 2025-04-11 NOTE — THERAPY
Occupational Therapy  Daily Treatment     Patient Name: Lily lLoyd  Age:  89 y.o., Sex:  female  Medical Record #: [id number]  Today's Date: 4/11/2025     Precautions  Precautions: Fall Risk, Platform Weight Bearing Right Upper Extremity, Other (See Comments)  Comments: HOB >30, RUE-short arm splint at all times, Light ADLs only, <1-2 lbs,  ROMAT of fingers and elbow    Assessment    Pt seen for follow up OT tx session, pt making good progress with functional mobility and ADLs seemed to be more steady with platform FWW. Will continue to see for skilled therapy while admitted as well as recommend home health therapy.    Plan    Treatment Plan Status: (P) Continue Current Treatment Plan  Type of Treatment: Self Care / Activities of Daily Living, Adaptive Equipment, Neuro Re-Education / Balance, Therapeutic Exercises, Therapeutic Activity  Treatment Frequency: 4 Times per Week  Treatment Duration: Until Therapy Goals Met    DC Equipment Recommendations: (P) Unable to determine at this time  Discharge Recommendations: (P) Recommend home health for continued occupational therapy services    Objective       04/11/25 1014   Precautions   Precautions Fall Risk;Platform Weight Bearing Right Upper Extremity;Other (See Comments)   Vitals   O2 Delivery Device None - Room Air   Pain 0 - 10 Group   Therapist Pain Assessment Post Activity Pain Same as Prior to Activity;Nurse Notified   Cognition    Cognition / Consciousness X   Orientation Level Oriented x 4   Level of Consciousness Alert   Active ROM Upper Body   Active ROM Upper Body  WDL   Dominant Hand Right   Balance   Sitting Balance (Static) Fair   Sitting Balance (Dynamic) Fair   Standing Balance (Static) Fair -   Standing Balance (Dynamic) Fair   Weight Shift Sitting Good   Weight Shift Standing Good   Skilled Intervention Verbal Cuing;Facilitation   Bed Mobility    Sit to Supine Supervised   Scooting Supervised   Rolling Supervised   Skilled Intervention Verbal Cuing    Activities of Daily Living   Grooming Supervision;Standing   Upper Body Dressing Supervision   Lower Body Dressing Supervision   Toileting Supervision   Skilled Intervention Verbal Cuing;Facilitation   How much help from another person does the patient currently need...   Putting on and taking off regular lower body clothing? 3   Bathing (including washing, rinsing, and drying)? 3   Toileting, which includes using a toilet, bedpan, or urinal? 3   Putting on and taking off regular upper body clothing? 3   Taking care of personal grooming such as brushing teeth? 3   Eating meals? 4   6 Clicks Daily Activity Score 19   Functional Mobility   Sit to Stand Supervised   Bed, Chair, Wheelchair Transfer Supervised   Toilet Transfers Supervised   Transfer Method Stand Step   Mobility STS from toilet, ambulate in hallway, back to bed   Skilled Intervention Verbal Cuing   Comments w/ platform FWW   Activity Tolerance   Sitting in Chair found seated in toilet   Standing 10 min   Short Term Goals   Short Term Goal # 1 Pt will perform LB dressing w/ supv and AE PRN   Goal Outcome # 1 Progressing as expected   Short Term Goal # 2 Pt will perform ADL transfer w/ supv   Goal Outcome # 2 Goal met   Short Term Goal # 3 Pt will perform standing g/h w/ supv   Goal Outcome # 3 Goal met   Short Term Goal # 4 Pt will perform toilet hygiene w/ supv   Goal Outcome # 4 Goal met   Education Group   Education Provided Role of Occupational Therapist   Role of Occupational Therapist Patient Response Patient;Acceptance;Explanation;Verbal Demonstration   Occupational Therapy Treatment Plan    O.T. Treatment Plan Continue Current Treatment Plan   Anticipated Discharge Equipment and Recommendations   DC Equipment Recommendations Unable to determine at this time   Discharge Recommendations Recommend home health for continued occupational therapy services   Interdisciplinary Plan of Care Collaboration   IDT Collaboration with  Nursing;Physical  Therapist   Patient Position at End of Therapy In Bed;Bed Alarm On;Call Light within Reach;Tray Table within Reach;Phone within Reach   Collaboration Comments RN updated

## 2025-04-11 NOTE — PROGRESS NOTES
Pt's sister called and let us know that the walker would be delivered to the house.   DCL order placed.

## 2025-04-11 NOTE — THERAPY
Physical Therapy   Initial Evaluation     Patient Name: Lily Lloyd  Age:  89 y.o., Sex:  female  Medical Record #: [id number]  Today's Date: 4/11/2025     Precautions  Precautions: Fall Risk;Platform Weight Bearing Right Upper Extremity;Other (See Comments)  Comments: HOB >30, RUE-short arm splint at all times, Light ADLs only, <1-2 lbs,  ROMAT of fingers and elbow    Assessment  Patient is 89 y.o. female presented on 4/8/2025 following a ground level fall.     Currently been managed for neck pain, thoracic and lumbar back pain, B/L Tib-Fib pain, R wrist fracture , traumatic R parietal subdural hematoma, electrolyte abnormality, abrasion of face       R wrist XR 4/8:  Distal radial metaphyseal fracture  Underwent Open reduction term fixation right extra-articular distal radius fracture on 4/9/2025     PMH: Hyponatremia, alcohol abuse, CKD, DLP, tobacco abuse, HTN     Patient seen for PT evaluation and treatment. Patient in bed, agreeable for the session. Able to demonstrate functional mobility tasks as detailed below. Currently appears to be below baseline level of functional mobility. Will continue to benefit from PT services to help improve overall functional mobility. Recommend HH PT with family supervision and assistance. Patient is refusing rehab placement at this time.     Plan    Physical Therapy Initial Treatment Plan   Treatment Plan : Bed Mobility, Equipment, Family / Caregiver Training, Gait Training, Neuro Re-Education / Balance, Stair Training, Therapeutic Activities, Therapeutic Exercise  Treatment Frequency: 4 Times per Week  Duration: Until Therapy Goals Met    DC Equipment Recommendations:  (Platform FWW)  Discharge Recommendations: Recommend home health for continued physical therapy services (With family supervision and assistance)     Objective     04/11/25 0950   Time In/Time Out   Therapy Start Time 0918   Therapy End Time 0950   Total Therapy Time 32   Initial Contact Note    Initial  Contact Note Order Received and Verified, Physical Therapy Evaluation in Progress with Full Report to Follow.   Precautions   Precautions Fall Risk;Platform Weight Bearing Right Upper Extremity;Other (See Comments)   Comments HOB >30, RUE-short arm splint at all times, Light ADLs only, <1-2 lbs,  ROMAT of fingers and elbow   Vitals   Pulse (!) 59   Patient BP Position Sitting  (EOB)   Blood Pressure  105/53   Pulse Oximetry 94 %   O2 Delivery Device None - Room Air   Vitals Comments Post activity, seated in the chair: /57, HR 56, SpO2 95   Pain   Pain Scales 0 to 10 Scale    Intervention Rest;Repositioned   Pain 0 - 10 Group   Location Face;Head;Wrist   Location Orientation Right   Therapist Pain Assessment Prior to Activity;During Activity;Post Activity;Post Activity Pain Same as Prior to Activity   Prior Living Situation   Prior Services None   Housing / Facility 2 Story House   Steps Into Home 1   Steps In Home 17   Rail None;Left Rail (Steps in Home)   Equipment Owned Front-Wheel Walker;Single Point Cane;Other (Comments)  (Stair lift)   Lives with - Patient's Self Care Capacity Sibling;Related Adult;Other (Comments)  (97Y old Aunt, Sister)   Comments Patient mentioned that she is the primary caregiver for her Aunt, her sister is currently taking care of her. Her sister has been recently diagnosed with cancer. She has an appointment with her cancer doctor today.   Prior Level of Functional Mobility   Bed Mobility Independent   Transfer Status Independent   Ambulation Independent   Ambulation Distance Community   Assistive Devices Used None   Stairs Independent   Comments Patient mentioned that she sometimes uses the Stair lift in her house, otherwise navigates the stairs with use of rails.   History of Falls   History of Falls Yes   Date of Last Fall   (H/O 1 fall PTA, another fall in Dec)   Cognition    Cognition / Consciousness X   Orientation Level Oriented x 4   Level of Consciousness Alert   Safety  Awareness Impaired   Active ROM Upper Body   Dominant Hand Right   Passive ROM Lower Body   Passive ROM Lower Body WDL   Active ROM Lower Body    Active ROM Lower Body  WDL   Strength Lower Body   Lower Body Strength  X   Comments Grossly BLE 4/5   Sensation Lower Body   Lower Extremity Sensation   WDL   Comments Patient denies any numbness/tingling in LE   Lower Body Muscle Tone   Lower Body Muscle Tone  WDL   Coordination Lower Body    Coordination Lower Body  WDL   Comments Able to perform heel to shin and alternate ankle movements B/L   Vision   Vision Comments Patient reporting intermittent double vision, occured during ambulation and cleared off once seated in the chair. Smooth pursuit-intermittent decreased focus; peripheral vision-WNL   Other Treatments   Other Treatments Provided Patient was educated on appropriate positioning of RUE at rest with use of pillow; AROM of R elbow, fingers. Discussed about DC recommendations, patient refusing rehab placement at this time, prefers to return home with  services. Reinforced importance of daily & frequent mobility, OOB to chair for meals and ambulate with assistance from nursing staff. Patient reporting dizziness with position changes, explained about possible dizziness from SDH and/or changes in BP, educated to maintain each position for brief minute prior to changing positions, standing rest break with wall support during ambulation. Assisted with set up of breakfast tray towards end of session.   Balance Assessment   Sitting Balance (Static) Fair   Sitting Balance (Dynamic) Fair   Standing Balance (Static) Fair -   Standing Balance (Dynamic) Poor +   Weight Shift Sitting Good   Weight Shift Standing Fair   Comments Seated EOB; Standing W/O AD-mild unsteadiness   Bed Mobility    Supine to Sit Standby Assist   Scooting Standby Assist   Rolling Standby Assist   Comments HOB raised upto 30 degree (per precautions), towards R side   Gait Analysis   Gait Level Of  Assist Contact Guard Assist   Assistive Device None   Distance (Feet) 60   # of Times Distance was Traveled 2   Deviation Bradykinetic;Decreased Base Of Support;Other (Comment)  (Reduced asymmetrical step & stride length, mild unsteadiness)   Comments Cues for appropriate balance, base of support. Increased unsteadiness with reports of double vision.   Functional Mobility   Sit to Stand Contact Guard Assist   Bed, Chair, Wheelchair Transfer Contact Guard Assist   Transfer Method Stand Step   Mobility Bed-EOB sitting-sit-stand-ambulate in the room & hallway-chair   Comments W/O AD; cues for hand placement, LE placement   6 Clicks Assessment - How much HELP from from another person do you currently need... (If the patient hasn't done an activity recently, how much help from another person do you think he/she would need if he/she tried?)   Turning from your back to your side while in a flat bed without using bedrails? 3   Moving from lying on your back to sitting on the side of a flat bed without using bedrails? 3   Moving to and from a bed to a chair (including a wheelchair)? 3   Standing up from a chair using your arms (e.g., wheelchair, or bedside chair)? 3   Walking in hospital room? 3   Climbing 3-5 steps with a railing? 3   6 clicks Mobility Score 18   Activity Tolerance   Sitting in Chair Post session   Edema / Skin Assessment   Edema / Skin  X   Comments R side face bruising; B/L lower leg old healing wounds   Patient / Family Goals    Patient / Family Goal #1 To return home   Short Term Goals    Short Term Goal # 1 Patient will perform supine-sit, sit-supine with HOB flat without rails with supervision in 6 visits to safely get in & out of bed   Short Term Goal # 2 Patient will perform sit-stand with LRAD with supervision in 6 visits to progress with functional mobility   Short Term Goal # 3 Patient will perform chair transfers with LRAD with supervision in 6 visits to safely get OOB to chair   Short Term Goal  # 4 Patient will ambulate 200 feet with LRAD with supervision in 6 visits to safely ambulate household & community distance   Short Term Goal # 5 Patient will negotiate 1 step with supervision in 6 visits to safely get in & out home   Education Group   Education Provided Role of Physical Therapist   Role of Physical Therapist Patient Response Patient;Acceptance;Explanation;Verbal Demonstration   Physical Therapy Initial Treatment Plan    Treatment Plan  Bed Mobility;Equipment;Family / Caregiver Training;Gait Training;Neuro Re-Education / Balance;Stair Training;Therapeutic Activities;Therapeutic Exercise   Treatment Frequency 4 Times per Week   Duration Until Therapy Goals Met   Problem List    Problems Pain;Impaired Bed Mobility;Impaired Transfers;Impaired Ambulation;Functional Strength Deficit;Impaired Balance;Safety Awareness Deficits / Cognition   Anticipated Discharge Equipment and Recommendations   DC Equipment Recommendations   (Platform FWW)   Discharge Recommendations Recommend home health for continued physical therapy services  (With family supervision and assistance)   Interdisciplinary Plan of Care Collaboration   IDT Collaboration with  Nursing;Occupational Therapist   Patient Position at End of Therapy Call Light within Reach;Tray Table within Reach;Seated;Chair Alarm On;Other (Comments)  (RUE supported on pillow)   Session Information   Date / Session Number  4/11-1(1/4, 4/17)      04/11/25 0940   Sitting Upper Body Exercises   Sitting Upper Body Exercises Yes   Bicep Curls 1 set of 10;Right    Other Exercises RUE- Fingers flexion/extension, Fingers abduction/adduction, Thumb flexion/extension/abduction/adduction, opposition-10 reps each   Comments Patient educated to continue performing the above ex's, 3-4 times per day, 10 reps each.

## 2025-04-11 NOTE — DISCHARGE PLANNING
Case Management Discharge Planning    Admission Date: 4/8/2025  GMLOS: 5.1  ALOS: 3    6-Clicks ADL Score: 17  6-Clicks Mobility Score: 18  PT and/or OT Eval ordered: Yes  Post-acute Referrals Ordered: Yes  Post-acute Choice Obtained: Yes  Has referral(s) been sent to post-acute provider:  Yes      Anticipated Discharge Dispo: Discharge Disposition: D/T to home under [facility]HA care in anticipation of covered skilled care (06)  Discharge Address: Home ([address] [place], [place] [address]  [place] [address])  Discharge Contact Phone Number: Rosalba ()    DME Needed: Yes    DME Ordered: Yes    Action(s) Taken: Choice obtained and Referral(s) sent  RN CM met with Pt and Pt's sister at bedside. Pt is agreeable with  services, received verbal approval for [facility] . Choice form completed, faxed to [facility] for processing.     1222-Pt  has been accepted by Sue . Referral sent to [facility] for Platform walker.    1415-RN CM called [facility] and RN was informed that Platform walker will be delivered within 2-3 hours. Bedside nurse informed.    DPA received a called and per [facility] they do not do specific platform walkers and they need the size and specifics of the walker.    RN CM called [employer] and they do platform walkers. DPA faxed the referral to [employer] manually.      Escalations Completed: Provider    Medically Clear: Yes    Next Steps: Follow up  acceptance    Barriers to Discharge: Outpatient referrals pending    Is the patient up for discharge tomorrow: No  Pt is discharging today.

## 2025-04-11 NOTE — PROGRESS NOTES
4 Eyes Skin Assessment Completed by FLORINA Jiménez and FLORINA Overton.    Head Bruising and Swelling, scabbing and abrasions on right face  Ears WDL  Nose Scab, bruising  Mouth WDL  Neck WDL, c-collar in place  Breast/Chest Discoloration/moles under breasts  Shoulder Blades WDL  Spine discoloration, moles  (R) Arm/Elbow/Hand Bruising and Swelling, cast in place  (L) Arm/Elbow/Hand Bruising  Abdomen discoloration/moles  Groin WDL  Scrotum/Coccyx/Buttocks bruising  (R) Leg Scar and Shiny, discoloration, bruising  (L) Leg Scar and Shiny, discoloration, bruising  (R) Heel/Foot/Toe WDL  (L) Heel/Foot/Toe WDL          Devices In Places Pulse Ox, SCD's, and Cervical Collar      Interventions In Place Pillows and Pressure Redistribution Mattress    Possible Skin Injury Yes    Pictures Uploaded Into Epic Yes  Wound Consult Placed N/A  RN Wound Prevention Protocol Ordered No

## (undated) DEVICE — SUCTION INSTRUMENT YANKAUER BULBOUS TIP W/O VENT (50EA/CA)

## (undated) DEVICE — DRAPE STRLE REG TOWEL 18X24 - (10/BX 4BX/CA)"

## (undated) DEVICE — SUTURE GENERAL

## (undated) DEVICE — SUTURE 3-0 ETHILON FS-1 - (36/BX) 30 INCH

## (undated) DEVICE — HEMOSTAT SURG ABSORBABLE - 4 X 8 IN SURGICEL (24EA/CA)

## (undated) DEVICE — ELECTRODE DUAL RETURN W/ CORD - (50/PK)

## (undated) DEVICE — GOWN SURGEONS X-LARGE - DISP. (30/CA)

## (undated) DEVICE — CANISTER SUCTION 3000ML MECHANICAL FILTER AUTO SHUTOFF MEDI-VAC NONSTERILE LF DISP  (40EA/CA)

## (undated) DEVICE — TRAY SKIN SCRUB PVP WET (20EA/CA) PART #DYND70356 DISCONTINUED

## (undated) DEVICE — GLOVE SZ 8 BIOGEL PI MICRO - PF LF (50PR/BX)

## (undated) DEVICE — TOWEL STOP TIMEOUT SAFETY FLAG (40EA/CA)

## (undated) DEVICE — SET EXTENSION WITH 2 PORTS (48EA/CA) ***PART #2C8610 IS A SUBSTITUTE*****

## (undated) DEVICE — SET LEADWIRE 5 LEAD BEDSIDE DISPOSABLE ECG (1SET OF 5/EA)

## (undated) DEVICE — NEEDLE, DISP 16G X 1-1/2 BLUNT

## (undated) DEVICE — COVER LIGHT HANDLE ALC PLUS DISP (18EA/BX)

## (undated) DEVICE — DRAPE 36X28IN RAD CARM BND BG - (25/CA)

## (undated) DEVICE — SLEEVE VASO DVT COMPRESSION CALF MED - (10PR/CA)

## (undated) DEVICE — BOVIE BLADE COATED &INSULATED - 25/PK

## (undated) DEVICE — LACTATED RINGERS INJ 1000 ML - (14EA/CA 60CA/PF)

## (undated) DEVICE — GLOVE PROTEXIS PI MICRO SZ 8.5 (200PR/CA)

## (undated) DEVICE — TUBING CLEARLINK DUO-VENT - C-FLO (48EA/CA)

## (undated) DEVICE — GLOVESZ 8.5 BIOGEL PI MICRO - PF LF (50PR/BX)

## (undated) DEVICE — GLOVE SZ 7.5 BIOGEL PI MICRO - PF LF (50PR/BX)

## (undated) DEVICE — FORCEPS IRRIGATING 8 X 1.5MM (5EA/BX)

## (undated) DEVICE — LACTATED RINGERS INJ. 500 ML - (24EA/CA)

## (undated) DEVICE — SLEEVE, VASO, THIGH, MED

## (undated) DEVICE — PADDING CAST 4 IN STERILE - 4 X 4 YDS (24/CA)

## (undated) DEVICE — MIDAS LUBRICATOR DIFFUSER PACK (4EA/CA)

## (undated) DEVICE — SUTURE 3-0 VICRYL PLUS SH - 8X 18 INCH (12/BX)

## (undated) DEVICE — GLOVE BIOGEL PI ORTHO SZ 6 SURGICAL PF LF (40PR/BX)

## (undated) DEVICE — GOWN WARMING STANDARD FLEX - (30/CA)

## (undated) DEVICE — BIT DRILL DIA2MM SCALED FOR VARIAX 2 WRIST FUSION LOCKING PLATE SYSTEM

## (undated) DEVICE — GLOVE BIOGEL PI INDICATOR SZ 6.5 SURGICAL PF LF - (50/BX 4BX/CA)

## (undated) DEVICE — KIT SURGIFLO W/OUT THROMBIN - (6EA/CA)

## (undated) DEVICE — PAD PREP 24 X 48 CUFFED - (100/CA)

## (undated) DEVICE — GLOVE BIOGEL PI INDICATOR SZ 7.0 SURGICAL PF LF - (50/BX 4BX/CA)

## (undated) DEVICE — GLOVE BIOGEL PI ORTHO SZ 7.5 PF LF (40PR/BX)

## (undated) DEVICE — GLOVE BIOGEL INDICATOR SZ 7.5 SURGICAL PF LTX - (50PR/BX 4BX/CA)

## (undated) DEVICE — TUBING C&T SET FLYING LEADS DRAIN TUBING (10EA/BX)

## (undated) DEVICE — PERFORATER DISP TIP DGR-0

## (undated) DEVICE — PACK CRANI - (1EA/CA)

## (undated) DEVICE — CHLORAPREP 26 ML APPLICATOR - ORANGE TINT(25/CA)

## (undated) DEVICE — PAD LAP STERILE 18 X 18 - (5/PK 40PK/CA)

## (undated) DEVICE — SODIUM CHL IRRIGATION 0.9% 1000ML (12EA/CA)

## (undated) DEVICE — PACK UPPER EXTREMITY (2EA/CA)

## (undated) DEVICE — GLOVE BIOGEL PI INDICATOR SZ 7.5 SURGICAL PF LF -(50/BX 4BX/CA)

## (undated) DEVICE — DRAPE IOBAN II INCISE 23X17 - (10EA/BX 4BX/CA)

## (undated) DEVICE — BANDAGE ELASTIC 4 HONEYCOMB - 4"X5YD LF (20/CA)"

## (undated) DEVICE — GLOVE BIOGEL SZ 7 SURGICAL PF LTX - (50PR/BX 4BX/CA)

## (undated) DEVICE — SUTURE 2-0 VICRYL PLUS CT-1 36 (36PK/BX)"

## (undated) DEVICE — GLOVE SZ 6.5 BIOGEL PI MICRO - PF LF (50PR/BX)

## (undated) DEVICE — TUBE CONNECT SUCTION CLEAR 120 X 1/4" (50EA/CA)"

## (undated) DEVICE — SUTURE 2-0 ETHILON FS - (36/BX) 18 INCH

## (undated) DEVICE — SUTURE 4-0 NUROLON CR/8 TF - (12/BX) ETHICON

## (undated) DEVICE — SUTURE 0 VICRYL PLUS CT-1 - 36 INCH (36/BX)

## (undated) DEVICE — SENSOR OXIMETER ADULT SPO2 RD SET (20EA/BX)

## (undated) DEVICE — SURGIFOAM (SIZE 100) - (6EA/CA)

## (undated) DEVICE — CANISTER SUCTION 3000ML MECHANICAL FILTER AUTO SHUTOFF MEDI-VAC NONSTERILE LF DISP (40EA/CA)